# Patient Record
Sex: FEMALE | Race: BLACK OR AFRICAN AMERICAN | NOT HISPANIC OR LATINO | Employment: FULL TIME | URBAN - METROPOLITAN AREA
[De-identification: names, ages, dates, MRNs, and addresses within clinical notes are randomized per-mention and may not be internally consistent; named-entity substitution may affect disease eponyms.]

---

## 2020-02-06 ENCOUNTER — TELEPHONE (OUTPATIENT)
Dept: OBSTETRICS AND GYNECOLOGY | Age: 25
End: 2020-02-06

## 2020-02-07 ENCOUNTER — TELEPHONE (OUTPATIENT)
Dept: OBSTETRICS AND GYNECOLOGY | Age: 25
End: 2020-02-07

## 2020-02-07 NOTE — TELEPHONE ENCOUNTER
BARBIE,   Health dept faxing medical records / patient labs, pt scheduled as a new pt  with DR Mckeon 02/12/20.

## 2020-02-12 ENCOUNTER — OFFICE VISIT (OUTPATIENT)
Dept: OBSTETRICS AND GYNECOLOGY | Age: 25
End: 2020-02-12

## 2020-02-12 VITALS
BODY MASS INDEX: 24.03 KG/M2 | DIASTOLIC BLOOD PRESSURE: 58 MMHG | SYSTOLIC BLOOD PRESSURE: 98 MMHG | WEIGHT: 135.6 LBS | HEIGHT: 63 IN

## 2020-02-12 DIAGNOSIS — R51.9 PREGNANCY HEADACHE IN THIRD TRIMESTER: ICD-10-CM

## 2020-02-12 DIAGNOSIS — Z3A.32 32 WEEKS GESTATION OF PREGNANCY: Primary | ICD-10-CM

## 2020-02-12 DIAGNOSIS — O98.119 SYPHILIS OF MOTHER DURING PREGNANCY: ICD-10-CM

## 2020-02-12 DIAGNOSIS — Z13.1 SCREENING FOR DIABETES MELLITUS: ICD-10-CM

## 2020-02-12 DIAGNOSIS — O09.30 HISTORY OF INADEQUATE PRENATAL CARE: ICD-10-CM

## 2020-02-12 DIAGNOSIS — O26.893 PREGNANCY HEADACHE IN THIRD TRIMESTER: ICD-10-CM

## 2020-02-12 DIAGNOSIS — Z98.891 PREVIOUS CESAREAN SECTION: ICD-10-CM

## 2020-02-12 DIAGNOSIS — Z13.89 SCREENING FOR HEMATURIA OR PROTEINURIA: ICD-10-CM

## 2020-02-12 DIAGNOSIS — Z11.3 SCREENING FOR STD (SEXUALLY TRANSMITTED DISEASE): ICD-10-CM

## 2020-02-12 DIAGNOSIS — Z23 NEED FOR PROPHYLACTIC VACCINATION WITH COMBINED DIPHTHERIA-TETANUS-PERTUSSIS (DTP) VACCINE: ICD-10-CM

## 2020-02-12 PROBLEM — O34.219 MATERNAL CARE FOR UNSPECIFIED TYPE SCAR FROM PREVIOUS CESAREAN DELIVERY: Status: ACTIVE | Noted: 2020-02-12

## 2020-02-12 PROBLEM — O99.330 TOBACCO USE DURING PREGNANCY, ANTEPARTUM: Status: ACTIVE | Noted: 2020-02-12

## 2020-02-12 PROBLEM — Z87.51 HISTORY OF PRETERM LABOR: Status: ACTIVE | Noted: 2020-02-12

## 2020-02-12 PROBLEM — O34.211 MATERNAL CARE DUE TO LOW TRANSVERSE UTERINE SCAR FROM PREVIOUS CESAREAN DELIVERY: Status: ACTIVE | Noted: 2020-02-12

## 2020-02-12 LAB
CLARITY, POC: CLEAR
COLOR UR: ABNORMAL
EXTERNAL ANTIBODY SCREEN: NORMAL
GLUCOSE UR STRIP-MCNC: NEGATIVE MG/DL
PROT UR STRIP-MCNC: ABNORMAL MG/DL

## 2020-02-12 PROCEDURE — 99203 OFFICE O/P NEW LOW 30 MIN: CPT | Performed by: OBSTETRICS & GYNECOLOGY

## 2020-02-12 PROCEDURE — 90715 TDAP VACCINE 7 YRS/> IM: CPT | Performed by: OBSTETRICS & GYNECOLOGY

## 2020-02-12 PROCEDURE — 90471 IMMUNIZATION ADMIN: CPT | Performed by: OBSTETRICS & GYNECOLOGY

## 2020-02-12 RX ORDER — MAGNESIUM OXIDE 400 MG/1
400 TABLET ORAL DAILY
Qty: 30 TABLET | Refills: 2 | Status: ON HOLD | OUTPATIENT
Start: 2020-02-12 | End: 2020-03-03

## 2020-02-12 NOTE — PROGRESS NOTES
Subjective     Chief Complaint   Patient presents with   • Gynecologic Exam     New gyn, 30 weeks transfer, 1hr gtt today, No problems today   • Establish Care       Chiquita Galvez is a 24 y.o.  whose LMP is Patient's last menstrual period was 2019 (exact date). presents for transfer of care at 32 4/7 wk gest. Pt states pregnancy is dated by a 7 wk US done at a pregnancy US center (non-medical clinic) and CRL gave EDC of 2020. She had only one prenatal visit with a nurse practitioner at a [facility] OB practive at 29 weeks but was unhappy with her care and desires to transfer. She denies h/o STD with sister present in the room but I reviewed the labs with her and she states she has had penicillin shots for an infection. Prenatal labs were abnormal for +RPR 1:64 and FTA was positive. Pt [facility] and has 2 of her 3 penicillin injections for syphilis (see media). She has her third dose scheduled tomorrow and she states she will do. She denies other stds. MFM US done 20 (in care everywhere) used LMP due date of 2020 and EFW 51%, normal marek, posterior placenta, 3VC, inadequate view of ductal arch.     Pt states she has had 4 term vaginal deliveries, a 16 wk SAB and last pregnancy complicated by  labor. She was delivered at 34 weeks due to non-reassuring fetal status. BPP , tried IOL but tracing was non-reassuring and an emergent LTCS was performed. She desires . She declines elective repeat C/S. She declines sterilization. She desires depo provera postpartum, will given first dose before leaving hospital.    Pt was initially scheduled for an US today for anatomy, however she was 45 minutes late for that appt, therefore, it was not done.    Soc Hx- she is here with her sister today, pt is unemployed, she quit tobacco 2019, she denies use of alcohol or drugs    No Additional Complaints Reported    The following portions of the patient's history were  "reviewed and updated as appropriate:vital signs, allergies, current medications, past family history, past medical history, past social history, past surgical history and problem list      Review of Systems   A comprehensive review of systems was negative except for: Neurological: positive for headaches  GI- no N/V, - no bleeding or cramping    Objective      BP 98/58   Ht 160 cm (63\")   Wt 61.5 kg (135 lb 9.6 oz)   LMP 2019 (Exact Date)   Breastfeeding No   BMI 24.02 kg/m²     Physical Exam    General:   alert, appears stated age and no distress   Heart: regular rate and rhythm, S1, S2 normal, no murmur, click, rub or gallop   Lungs: clear to auscultation bilaterally   Breast: Not performed today   Neck: thyroid not enlarged, symmetric, no tenderness/mass/nodules   Abdomen: {soft NT ND, FH=32 cm   CVA: absent   Pelvis: Not performed today   Extremities: Extremities normal, atraumatic, no cyanosis or edema   Neurologic: negative   Psychiatric: Normal affect, judgement, and mood       Lab Review   Labs: prenatal labs reviewed in care everywhere after our labs were ordered (wasn't available at time of visit)  2020 RPR+ 1:64, syphilis Ab+    Imaging   Reviewed [facility] US report from 2020    Assessment/Plan     ASSESSMENT  1. 32 weeks gestation of pregnancy    2. Screening for diabetes mellitus    3. Syphilis of mother during pregnancy    4. Previous  section    5. Need for prophylactic vaccination with combined diphtheria-tetanus-pertussis (DTP) vaccine    6. Pregnancy headache in third trimester    7. Screening for hematuria or proteinuria    8. Screening for STD (sexually transmitted disease)    9. History of inadequate prenatal care        PLAN  1.   Orders Placed This Encounter   Procedures   • Urine Culture - Urine, Urine, Clean Catch   • Chlamydia trachomatis, Neisseria gonorrhoeae, PCR - Urine, Urine, Clean Catch   • Tdap Vaccine Greater Than or Equal To 8yo IM   • Gestational Screen 1 " Hr ([facility])   • TSH   • OB Panel With HIV   • Drug Profile Urine - 9 Drugs - Urine, Clean Catch   • POC Urinalysis Dipstick       2. Medications prescribed this encounter:        New Medications Ordered This Visit   Medications   • Prenatal MV-Min-Fe Fum-FA-DHA (PRENATAL 1) 30-0.975-200 MG capsule     Sig: Take 1 tablet by mouth Daily.     Dispense:  30 capsule     Refill:  6   • magnesium oxide (MAG-OX) 400 MG tablet     Sig: Take 1 tablet by mouth Daily.     Dispense:  30 tablet     Refill:  2       3. Discussed risk of - 1% risk of uterine rupture in labor, rarely can be catastrophic and life threatening to mom and baby. Pt declines elective repeat at this time and has planned .  4. Try magnesium oxide daily for headache, if not better, refer to Neuro  5. Refer to M for consult regarding syphilis is pregnancy and later/inadequate prenatal care  6. H/o  labor but too late for progesterone therapy  7. Start weekly BPP due to h/o abnormal BPP at 34 weeks with last pregnancy.  8. 1hr glucose today  9. Offered PP sterilization but pt declines, desires depo provera    Follow up: 1 week(s)    Dora Mckeon MD  2020

## 2020-02-13 LAB
ABO GROUP BLD: ABNORMAL
AMPHETAMINES UR QL SCN: NEGATIVE NG/ML
BARBITURATES UR QL SCN: NEGATIVE NG/ML
BASOPHILS # BLD AUTO: 0 X10E3/UL (ref 0–0.2)
BASOPHILS NFR BLD AUTO: 0 %
BENZODIAZ UR QL: NEGATIVE NG/ML
BLD GP AB SCN SERPL QL: NEGATIVE
BZE UR QL: NEGATIVE NG/ML
CANNABINOIDS UR QL SCN: NEGATIVE NG/ML
EOSINOPHIL # BLD AUTO: 0.2 X10E3/UL (ref 0–0.4)
EOSINOPHIL NFR BLD AUTO: 2 %
ERYTHROCYTE [DISTWIDTH] IN BLOOD BY AUTOMATED COUNT: 17.8 % (ref 11.7–15.4)
GLUCOSE 1H P 50 G GLC PO SERPL-MCNC: 136 MG/DL (ref 65–179)
HBV SURFACE AG SERPL QL IA: NEGATIVE
HCT VFR BLD AUTO: 29.3 % (ref 34–46.6)
HCV AB S/CO SERPL IA: 0.1 S/CO RATIO (ref 0–0.9)
HGB BLD-MCNC: 8.3 G/DL (ref 11.1–15.9)
HIV 1+2 AB+HIV1 P24 AG SERPL QL IA: NON REACTIVE
IMM GRANULOCYTES # BLD AUTO: 0 X10E3/UL (ref 0–0.1)
IMM GRANULOCYTES NFR BLD AUTO: 0 %
LYMPHOCYTES # BLD AUTO: 2.4 X10E3/UL (ref 0.7–3.1)
LYMPHOCYTES NFR BLD AUTO: 26 %
MCH RBC QN AUTO: 18.6 PG (ref 26.6–33)
MCHC RBC AUTO-ENTMCNC: 28.3 G/DL (ref 31.5–35.7)
MCV RBC AUTO: 66 FL (ref 79–97)
METHADONE UR QL SCN: NEGATIVE NG/ML
MONOCYTES # BLD AUTO: 0.4 X10E3/UL (ref 0.1–0.9)
MONOCYTES NFR BLD AUTO: 4 %
NEUTROPHILS # BLD AUTO: 6.1 X10E3/UL (ref 1.4–7)
NEUTROPHILS NFR BLD AUTO: 68 %
OPIATES UR QL: NEGATIVE NG/ML
PCP UR QL: NEGATIVE NG/ML
PLATELET # BLD AUTO: 273 X10E3/UL (ref 150–450)
PROPOXYPH UR QL SCN: NEGATIVE NG/ML
RBC # BLD AUTO: 4.47 X10E6/UL (ref 3.77–5.28)
RH BLD: POSITIVE
RPR SER QL: REACTIVE
RPR SER-TITR: ABNORMAL {TITER}
RUBV IGG SERPL IA-ACNC: 13.2 INDEX
TREPONEMA PALLIDUM IGG+IGM AB [PRESENCE] IN SERUM OR PLASMA BY IMMUNOASSAY: REACTIVE
TSH SERPL DL<=0.005 MIU/L-ACNC: 1.11 UIU/ML (ref 0.27–4.2)
WBC # BLD AUTO: 9.2 X10E3/UL (ref 3.4–10.8)

## 2020-02-15 LAB
BACTERIA UR CULT: ABNORMAL
BACTERIA UR CULT: ABNORMAL
C TRACH RRNA SPEC QL NAA+PROBE: NEGATIVE
N GONORRHOEA RRNA SPEC QL NAA+PROBE: NEGATIVE

## 2020-02-17 ENCOUNTER — TELEPHONE (OUTPATIENT)
Dept: OBSTETRICS AND GYNECOLOGY | Age: 25
End: 2020-02-17

## 2020-02-17 NOTE — TELEPHONE ENCOUNTER
Mervat,  at [facility], has been trying multiple times to reach the pt to notify her of her appt scheduled 2/19/2020 following her appt with Dr Mckeon the same day. Pt mother has been answering the phone, but she is not on the verbal release form.     Rahcna would you please try to reach the pt to notify her of [facility] appt? Please Advise

## 2020-02-18 ENCOUNTER — TELEPHONE (OUTPATIENT)
Dept: OBSTETRICS AND GYNECOLOGY | Age: 25
End: 2020-02-18

## 2020-02-18 NOTE — TELEPHONE ENCOUNTER
Per [facility] policy, pt cannot have 2 US in one day - US at [facility] and US at [facility] 2/19. Mervat has rescheduled the pt US at [facility] for 2/26/2020 @ 8am. Please update pt with rescheduled appt tomorrow. Thank you Rachna.

## 2020-02-18 NOTE — TELEPHONE ENCOUNTER
Tenzin Briscoe can you inform pt of her [facility] appt at 1pm when she comes in for her appt with Dr Mckeon at 10:45a tomorrow?    [facility] - [address], [facility] [address]    Thank you!!

## 2020-02-19 ENCOUNTER — ROUTINE PRENATAL (OUTPATIENT)
Dept: OBSTETRICS AND GYNECOLOGY | Age: 25
End: 2020-02-19

## 2020-02-19 ENCOUNTER — APPOINTMENT (OUTPATIENT)
Dept: ULTRASOUND IMAGING | Facility: HOSPITAL | Age: 25
End: 2020-02-19

## 2020-02-19 ENCOUNTER — PROCEDURE VISIT (OUTPATIENT)
Dept: OBSTETRICS AND GYNECOLOGY | Age: 25
End: 2020-02-19

## 2020-02-19 ENCOUNTER — HOSPITAL ENCOUNTER (INPATIENT)
Facility: HOSPITAL | Age: 25
LOS: 2 days | Discharge: HOME | End: 2020-02-21
Attending: OBSTETRICS & GYNECOLOGY | Admitting: OBSTETRICS & GYNECOLOGY

## 2020-02-19 VITALS — BODY MASS INDEX: 24.66 KG/M2 | SYSTOLIC BLOOD PRESSURE: 104 MMHG | WEIGHT: 139.2 LBS | DIASTOLIC BLOOD PRESSURE: 60 MMHG

## 2020-02-19 DIAGNOSIS — O12.13 PROTEINURIA AFFECTING PREGNANCY IN THIRD TRIMESTER: ICD-10-CM

## 2020-02-19 DIAGNOSIS — O34.211 MATERNAL CARE DUE TO LOW TRANSVERSE UTERINE SCAR FROM PREVIOUS CESAREAN DELIVERY: ICD-10-CM

## 2020-02-19 DIAGNOSIS — O99.019 IRON DEFICIENCY ANEMIA DURING PREGNANCY: ICD-10-CM

## 2020-02-19 DIAGNOSIS — D50.9 IRON DEFICIENCY ANEMIA DURING PREGNANCY: ICD-10-CM

## 2020-02-19 DIAGNOSIS — O99.330 TOBACCO USE DURING PREGNANCY, ANTEPARTUM: ICD-10-CM

## 2020-02-19 DIAGNOSIS — O09.30 HISTORY OF INADEQUATE PRENATAL CARE: Primary | ICD-10-CM

## 2020-02-19 DIAGNOSIS — Z3A.33 33 WEEKS GESTATION OF PREGNANCY: Primary | ICD-10-CM

## 2020-02-19 DIAGNOSIS — Z87.51 HISTORY OF PRETERM LABOR: ICD-10-CM

## 2020-02-19 PROBLEM — Z34.90 PREGNANCY: Status: ACTIVE | Noted: 2020-02-19

## 2020-02-19 PROBLEM — E87.6 HYPOKALEMIA: Status: ACTIVE | Noted: 2020-02-19

## 2020-02-19 LAB
ALBUMIN SERPL-MCNC: 3.3 G/DL (ref 3.5–5.2)
ALBUMIN/GLOB SERPL: 1 G/DL
ALP SERPL-CCNC: 107 U/L (ref 39–117)
ALT SERPL W P-5'-P-CCNC: 5 U/L (ref 1–33)
ANION GAP SERPL CALCULATED.3IONS-SCNC: 8.6 MMOL/L (ref 5–15)
ANISOCYTOSIS BLD QL: ABNORMAL
AST SERPL-CCNC: 14 U/L (ref 1–32)
BILIRUB SERPL-MCNC: 0.2 MG/DL (ref 0.2–1.2)
BUN BLD-MCNC: 4 MG/DL (ref 6–20)
BUN/CREAT SERPL: 10.5 (ref 7–25)
BURR CELLS BLD QL SMEAR: ABNORMAL
CALCIUM SPEC-SCNC: 8.3 MG/DL (ref 8.6–10.5)
CHLORIDE SERPL-SCNC: 102 MMOL/L (ref 98–107)
CLARITY, POC: CLEAR
CO2 SERPL-SCNC: 23.4 MMOL/L (ref 22–29)
COLOR UR: ABNORMAL
CREAT BLD-MCNC: 0.38 MG/DL (ref 0.57–1)
CREAT UR-MCNC: 153.1 MG/DL
DEPRECATED RDW RBC AUTO: 38.7 FL (ref 37–54)
ERYTHROCYTE [DISTWIDTH] IN BLOOD BY AUTOMATED COUNT: 18 % (ref 12.3–15.4)
GFR SERPL CREATININE-BSD FRML MDRD: >150 ML/MIN/1.73
GLOBULIN UR ELPH-MCNC: 3.2 GM/DL
GLUCOSE BLD-MCNC: 77 MG/DL (ref 65–99)
GLUCOSE UR STRIP-MCNC: NEGATIVE MG/DL
HCT VFR BLD AUTO: 24.2 % (ref 34–46.6)
HEMOGLOBIN FRACTIONATION: NORMAL
HGB BLD-MCNC: 7.1 G/DL (ref 12–15.9)
HYPOCHROMIA BLD QL: ABNORMAL
LYMPHOCYTES # BLD MANUAL: 1.04 10*3/MM3 (ref 0.7–3.1)
LYMPHOCYTES NFR BLD MANUAL: 12.6 % (ref 19.6–45.3)
LYMPHOCYTES NFR BLD MANUAL: 3.2 % (ref 5–12)
MCH RBC QN AUTO: 18.3 PG (ref 26.6–33)
MCHC RBC AUTO-ENTMCNC: 29.3 G/DL (ref 31.5–35.7)
MCV RBC AUTO: 62.4 FL (ref 79–97)
MICROCYTES BLD QL: ABNORMAL
MONOCYTES # BLD AUTO: 0.27 10*3/MM3 (ref 0.1–0.9)
NEUTROPHILS # BLD AUTO: 6.98 10*3/MM3 (ref 1.7–7)
NEUTROPHILS NFR BLD MANUAL: 84.2 % (ref 42.7–76)
PLAT MORPH BLD: NORMAL
PLATELET # BLD AUTO: 236 10*3/MM3 (ref 140–450)
PMV BLD AUTO: 10.7 FL (ref 6–12)
POIKILOCYTOSIS BLD QL SMEAR: ABNORMAL
POLYCHROMASIA BLD QL SMEAR: ABNORMAL
POTASSIUM BLD-SCNC: 3.3 MMOL/L (ref 3.5–5.2)
PROT SERPL-MCNC: 6.5 G/DL (ref 6–8.5)
PROT UR STRIP-MCNC: ABNORMAL MG/DL
PROT UR-MCNC: 20 MG/DL
PROT/CREAT UR: 130.6 MG/G CREA (ref 0–200)
RBC # BLD AUTO: 3.88 10*6/MM3 (ref 3.77–5.28)
SODIUM BLD-SCNC: 134 MMOL/L (ref 136–145)
WBC MORPH BLD: NORMAL
WBC NRBC COR # BLD: 8.29 10*3/MM3 (ref 3.4–10.8)

## 2020-02-19 PROCEDURE — 85007 BL SMEAR W/DIFF WBC COUNT: CPT | Performed by: OBSTETRICS & GYNECOLOGY

## 2020-02-19 PROCEDURE — 83021 HEMOGLOBIN CHROMOTOGRAPHY: CPT | Performed by: OBSTETRICS & GYNECOLOGY

## 2020-02-19 PROCEDURE — 76819 FETAL BIOPHYS PROFIL W/O NST: CPT | Performed by: OBSTETRICS & GYNECOLOGY

## 2020-02-19 PROCEDURE — 80053 COMPREHEN METABOLIC PANEL: CPT | Performed by: OBSTETRICS & GYNECOLOGY

## 2020-02-19 PROCEDURE — 59025 FETAL NON-STRESS TEST: CPT | Performed by: OBSTETRICS & GYNECOLOGY

## 2020-02-19 PROCEDURE — 76805 OB US >/= 14 WKS SNGL FETUS: CPT | Performed by: OBSTETRICS & GYNECOLOGY

## 2020-02-19 PROCEDURE — 99221 1ST HOSP IP/OBS SF/LOW 40: CPT | Performed by: OBSTETRICS & GYNECOLOGY

## 2020-02-19 PROCEDURE — 85027 COMPLETE CBC AUTOMATED: CPT | Performed by: OBSTETRICS & GYNECOLOGY

## 2020-02-19 PROCEDURE — 84156 ASSAY OF PROTEIN URINE: CPT | Performed by: OBSTETRICS & GYNECOLOGY

## 2020-02-19 PROCEDURE — 85660 RBC SICKLE CELL TEST: CPT | Performed by: OBSTETRICS & GYNECOLOGY

## 2020-02-19 PROCEDURE — 82570 ASSAY OF URINE CREATININE: CPT | Performed by: OBSTETRICS & GYNECOLOGY

## 2020-02-19 PROCEDURE — 99214 OFFICE O/P EST MOD 30 MIN: CPT | Performed by: OBSTETRICS & GYNECOLOGY

## 2020-02-19 PROCEDURE — 59025 FETAL NON-STRESS TEST: CPT

## 2020-02-19 RX ORDER — POTASSIUM CHLORIDE 1.5 G/1.77G
20 POWDER, FOR SOLUTION ORAL 2 TIMES DAILY
Status: DISCONTINUED | OUTPATIENT
Start: 2020-02-19 | End: 2020-02-21 | Stop reason: HOSPADM

## 2020-02-19 RX ORDER — ACETAMINOPHEN 325 MG/1
625 TABLET ORAL EVERY 6 HOURS PRN
Status: DISCONTINUED | OUTPATIENT
Start: 2020-02-19 | End: 2020-02-21 | Stop reason: HOSPADM

## 2020-02-19 RX ORDER — PRENATAL VIT NO.126/IRON/FOLIC 28MG-0.8MG
1 TABLET ORAL DAILY
Status: DISCONTINUED | OUTPATIENT
Start: 2020-02-19 | End: 2020-02-21 | Stop reason: HOSPADM

## 2020-02-19 RX ORDER — ZOLPIDEM TARTRATE 5 MG/1
5 TABLET ORAL NIGHTLY PRN
Status: DISCONTINUED | OUTPATIENT
Start: 2020-02-19 | End: 2020-02-21 | Stop reason: HOSPADM

## 2020-02-19 RX ORDER — CALCIUM CARBONATE 200(500)MG
1 TABLET,CHEWABLE ORAL DAILY
Status: ON HOLD | COMMUNITY
End: 2020-03-18

## 2020-02-19 RX ADMIN — Medication 400 MG: at 22:46

## 2020-02-19 RX ADMIN — Medication 1 TABLET: at 22:22

## 2020-02-19 RX ADMIN — POTASSIUM CHLORIDE 20 MEQ: 1.5 POWDER, FOR SOLUTION ORAL at 22:22

## 2020-02-19 NOTE — PROGRESS NOTES
Pregnancy at 33 4/7 wk gest  Pt c/o good fetal movement, c/o some irregular contractions, no bleeding, did have 3rd injection of penicillin for syphilis  ROS- all negative except for some contractions  Exam-awake/alert in nad  Abd-soft gravid NT  extr- NT  Neuro- no deficits  US today wt 21% AC 22% posterior placenta, normal marek (but spine and feet not seen), BPP 8/8 DANIEL=15  A/P pregnancy at 33 4/7 wk gest  1. Inadequate prenatal care- MFM consult, toxo screen was negative last week  2. Severe anemia- ferritin sent today, will need IV iron, will obtain Heme consult in hospital  3. Syphilis in pregnancy- first RPR titer was 1:64 at [facility], now down to 1:16, plan MFM consult  4. 2+ proteinuria today- recommend in-pt hospitalization for 24 hr urine and evaluation for pre-eclampsia as pt has not been reliable.  5. H/o  delivery with non-reassuring fetal status at 34 weeks- continue weekly BPPs

## 2020-02-20 ENCOUNTER — APPOINTMENT (OUTPATIENT)
Dept: ULTRASOUND IMAGING | Facility: HOSPITAL | Age: 25
End: 2020-02-20

## 2020-02-20 LAB
ABO GROUP BLD: NORMAL
AMPHET+METHAMPHET UR QL: NEGATIVE
ANION GAP SERPL CALCULATED.3IONS-SCNC: 10 MMOL/L (ref 5–15)
BARBITURATES UR QL SCN: NEGATIVE
BENZODIAZ UR QL SCN: NEGATIVE
BLD GP AB SCN SERPL QL: NEGATIVE
BUN BLD-MCNC: 3 MG/DL (ref 6–20)
BUN/CREAT SERPL: 7.3 (ref 7–25)
CALCIUM SPEC-SCNC: 8.2 MG/DL (ref 8.6–10.5)
CANNABINOIDS SERPL QL: NEGATIVE
CHLORIDE SERPL-SCNC: 103 MMOL/L (ref 98–107)
CO2 SERPL-SCNC: 21 MMOL/L (ref 22–29)
COCAINE UR QL: NEGATIVE
CREAT BLD-MCNC: 0.41 MG/DL (ref 0.57–1)
FERRITIN SERPL-MCNC: 5.77 NG/ML (ref 13–150)
FOLATE SERPL-MCNC: 13.4 NG/ML (ref 4.78–24.2)
GFR SERPL CREATININE-BSD FRML MDRD: >150 ML/MIN/1.73
GLUCOSE 1H P 100 G GLC PO SERPL-MCNC: 86 MG/DL (ref 74–180)
GLUCOSE 2H P 100 G GLC PO SERPL-MCNC: 109 MG/DL (ref 74–155)
GLUCOSE 3H P 100 G GLC PO SERPL-MCNC: 110 MG/DL (ref 74–140)
GLUCOSE BLD-MCNC: 82 MG/DL (ref 65–99)
IRON 24H UR-MRATE: 48 MCG/DL (ref 37–145)
IRON SATN MFR SERPL: 10 % (ref 20–50)
LDH SERPL-CCNC: 175 U/L (ref 135–214)
METHADONE UR QL SCN: NEGATIVE
OPIATES UR QL: NEGATIVE
OXYCODONE UR QL SCN: NEGATIVE
POTASSIUM BLD-SCNC: 3.7 MMOL/L (ref 3.5–5.2)
PROT 24H UR-MRATE: 180 MG/24HOURS (ref 0–150)
RETICS # AUTO: 0.06 10*6/MM3 (ref 0.02–0.13)
RETICS/RBC NFR AUTO: 1.24 % (ref 0.7–1.9)
RH BLD: POSITIVE
SODIUM BLD-SCNC: 134 MMOL/L (ref 136–145)
T&S EXPIRATION DATE: NORMAL
TIBC SERPL-MCNC: 501 MCG/DL (ref 298–536)
TRANSFERRIN SERPL-MCNC: 336 MG/DL (ref 200–360)
VIT B12 BLD-MCNC: 230 PG/ML (ref 211–946)

## 2020-02-20 PROCEDURE — 93005 ELECTROCARDIOGRAM TRACING: CPT | Performed by: OBSTETRICS & GYNECOLOGY

## 2020-02-20 PROCEDURE — 86901 BLOOD TYPING SEROLOGIC RH(D): CPT | Performed by: OBSTETRICS & GYNECOLOGY

## 2020-02-20 PROCEDURE — 80299 QUANTITATIVE ASSAY DRUG: CPT | Performed by: OBSTETRICS & GYNECOLOGY

## 2020-02-20 PROCEDURE — 85045 AUTOMATED RETICULOCYTE COUNT: CPT | Performed by: INTERNAL MEDICINE

## 2020-02-20 PROCEDURE — 99232 SBSQ HOSP IP/OBS MODERATE 35: CPT | Performed by: OBSTETRICS & GYNECOLOGY

## 2020-02-20 PROCEDURE — G0480 DRUG TEST DEF 1-7 CLASSES: HCPCS | Performed by: OBSTETRICS & GYNECOLOGY

## 2020-02-20 PROCEDURE — 80307 DRUG TEST PRSMV CHEM ANLYZR: CPT | Performed by: OBSTETRICS & GYNECOLOGY

## 2020-02-20 PROCEDURE — 93010 ELECTROCARDIOGRAM REPORT: CPT | Performed by: INTERNAL MEDICINE

## 2020-02-20 PROCEDURE — 83540 ASSAY OF IRON: CPT | Performed by: OBSTETRICS & GYNECOLOGY

## 2020-02-20 PROCEDURE — 84466 ASSAY OF TRANSFERRIN: CPT | Performed by: OBSTETRICS & GYNECOLOGY

## 2020-02-20 PROCEDURE — 63710000001 DIPHENHYDRAMINE PER 50 MG: Performed by: INTERNAL MEDICINE

## 2020-02-20 PROCEDURE — 86900 BLOOD TYPING SEROLOGIC ABO: CPT | Performed by: OBSTETRICS & GYNECOLOGY

## 2020-02-20 PROCEDURE — 76818 FETAL BIOPHYS PROFILE W/NST: CPT

## 2020-02-20 PROCEDURE — 99255 IP/OBS CONSLTJ NEW/EST HI 80: CPT | Performed by: INTERNAL MEDICINE

## 2020-02-20 PROCEDURE — 81050 URINALYSIS VOLUME MEASURE: CPT | Performed by: OBSTETRICS & GYNECOLOGY

## 2020-02-20 PROCEDURE — 80184 ASSAY OF PHENOBARBITAL: CPT | Performed by: OBSTETRICS & GYNECOLOGY

## 2020-02-20 PROCEDURE — 82607 VITAMIN B-12: CPT | Performed by: OBSTETRICS & GYNECOLOGY

## 2020-02-20 PROCEDURE — 25010000002 CYANOCOBALAMIN PER 1000 MCG: Performed by: INTERNAL MEDICINE

## 2020-02-20 PROCEDURE — 82951 GLUCOSE TOLERANCE TEST (GTT): CPT | Performed by: OBSTETRICS & GYNECOLOGY

## 2020-02-20 PROCEDURE — 83615 LACTATE (LD) (LDH) ENZYME: CPT | Performed by: INTERNAL MEDICINE

## 2020-02-20 PROCEDURE — 82746 ASSAY OF FOLIC ACID SERUM: CPT | Performed by: OBSTETRICS & GYNECOLOGY

## 2020-02-20 PROCEDURE — 76810 OB US >/= 14 WKS ADDL FETUS: CPT

## 2020-02-20 PROCEDURE — 99222 1ST HOSP IP/OBS MODERATE 55: CPT | Performed by: INTERNAL MEDICINE

## 2020-02-20 PROCEDURE — 80048 BASIC METABOLIC PNL TOTAL CA: CPT | Performed by: OBSTETRICS & GYNECOLOGY

## 2020-02-20 PROCEDURE — 25010000002 IRON SUCROSE PER 1 MG: Performed by: INTERNAL MEDICINE

## 2020-02-20 PROCEDURE — 84156 ASSAY OF PROTEIN URINE: CPT | Performed by: OBSTETRICS & GYNECOLOGY

## 2020-02-20 PROCEDURE — 86850 RBC ANTIBODY SCREEN: CPT | Performed by: OBSTETRICS & GYNECOLOGY

## 2020-02-20 PROCEDURE — 82952 GTT-ADDED SAMPLES: CPT | Performed by: OBSTETRICS & GYNECOLOGY

## 2020-02-20 RX ORDER — SODIUM CHLORIDE 0.9 % (FLUSH) 0.9 %
10 SYRINGE (ML) INJECTION EVERY 12 HOURS SCHEDULED
Status: DISCONTINUED | OUTPATIENT
Start: 2020-02-20 | End: 2020-02-21 | Stop reason: HOSPADM

## 2020-02-20 RX ORDER — DIPHENHYDRAMINE HCL 25 MG
25 CAPSULE ORAL ONCE
Status: COMPLETED | OUTPATIENT
Start: 2020-02-20 | End: 2020-02-20

## 2020-02-20 RX ORDER — ACETAMINOPHEN 325 MG/1
650 TABLET ORAL ONCE
Status: COMPLETED | OUTPATIENT
Start: 2020-02-20 | End: 2020-02-20

## 2020-02-20 RX ORDER — FAMOTIDINE 10 MG/ML
20 INJECTION, SOLUTION INTRAVENOUS 2 TIMES DAILY
Status: DISCONTINUED | OUTPATIENT
Start: 2020-02-20 | End: 2020-02-20

## 2020-02-20 RX ORDER — SODIUM CHLORIDE 0.9 % (FLUSH) 0.9 %
10 SYRINGE (ML) INJECTION AS NEEDED
Status: DISCONTINUED | OUTPATIENT
Start: 2020-02-20 | End: 2020-02-21 | Stop reason: HOSPADM

## 2020-02-20 RX ORDER — CYANOCOBALAMIN 1000 UG/ML
1000 INJECTION, SOLUTION INTRAMUSCULAR; SUBCUTANEOUS DAILY
Status: DISCONTINUED | OUTPATIENT
Start: 2020-02-20 | End: 2020-02-21 | Stop reason: HOSPADM

## 2020-02-20 RX ADMIN — ZOLPIDEM TARTRATE 5 MG: 5 TABLET ORAL at 20:55

## 2020-02-20 RX ADMIN — IRON SUCROSE 200 MG: 20 INJECTION, SOLUTION INTRAVENOUS at 14:10

## 2020-02-20 RX ADMIN — ACETAMINOPHEN 650 MG: 325 TABLET, FILM COATED ORAL at 13:06

## 2020-02-20 RX ADMIN — DIPHENHYDRAMINE HYDROCHLORIDE 25 MG: 25 CAPSULE ORAL at 13:07

## 2020-02-20 RX ADMIN — FAMOTIDINE 20 MG: 10 INJECTION INTRAVENOUS at 13:56

## 2020-02-20 RX ADMIN — CYANOCOBALAMIN 1000 MCG: 1000 INJECTION, SOLUTION INTRAMUSCULAR at 13:06

## 2020-02-20 RX ADMIN — ACETAMINOPHEN 650 MG: 325 TABLET, FILM COATED ORAL at 20:55

## 2020-02-20 NOTE — NON STRESS TEST
Chiquita Galvez, a  at 33w5d with an MRAISA of 2020, Date entered prior to episode creation, was seen at [facility] for a nonstress test.    Chief Complaint   Patient presents with   • Elevated Blood Pressure     sent from office for protienuria, pt states she has low iron and needs to repeat a GTT. +FM, denies LOF or VB, c/o frontal headache       Patient Active Problem List   Diagnosis   • Maternal care due to low transverse uterine scar from previous  delivery   • History of inadequate prenatal care   • Syphilis of mother during pregnancy   • History of  labor   • Tobacco use during pregnancy, antepartum   • Pregnancy   • Proteinuria affecting pregnancy in third trimester   • Hypokalemia       Start Time:   Stop Time:      reactive

## 2020-02-20 NOTE — CONSULTS
Subjective     REASON FOR CONSULTATION:  1.   7, para 4, 33 weeks and 4 days pregnant    2.  History of syphilis, infectious disease been consulted.  Patient received 3 penicillin injections by the health department    3.  Anemia likely secondary to iron deficiency and B12 deficiency.    Provide an opinion on any further workup or treatment                             REQUESTING PHYSICIAN: Dr. John cardiac    RECORDS OBTAINED:  Records of the patients history including those obtained from the referring provider were reviewed and summarized in detail.    HISTORY OF PRESENT ILLNESS:  The patient is a 24 y.o. year old female who is here for an opinion about the above issue.    History of Present Illness patient is a 24-year-old female  7 para 4, currently 33 weeks and 4 days pregnant who is here today.  She had complaints of headache and she had protein in the urine.  Patient did not get prenatal care early on.  She had one visit at 29 weeks with Devi then was transferred to the [facility] last week.  She has diagnosis with syphilis at 29 weeks and she has had 3 penicillin injections at the health department.  Her initial RPR titer was 1 is 264 and recheck last week was 1 is 216.  Her previous obstetric/gynecological history is noted for 4 vaginal deliveries and 1 .  Her last pregnancy was at 34 weeks.  She presented with  labor but had urgent C-sections due to nonreassuring fetal status.  She declined tubal sterilization this time.  She has quit smoking this pregnancy.  Her hemoglobin was 8.3 last week and today is 7.1.  She has been taking 1 iron pill a day.  Her glucose was elevated at 136.  Her liver function tests are normal.    We were consulted because she was anemic.  She is found to be iron deficient as well as B12 deficient.  Her B12 level is 231.  She will require IV Venofer as she cannot tolerate oral iron and start on B12 injections.    Patient is receiving 25  urine for protein.  They have requested an infectious disease consult.  We have been consulted in order to see if she is eligible for IV iron infusions.    When they were doing the 3-hour glucose tolerance test she became tachycardic in the 120s and was transferred to the labor and delivery for observation and now her EKG was done which showed heart rate of 99 normal sinus rhythm    Past Medical History:   Diagnosis Date   • Migraine    • Syphilis 2019    FTA positive during pregnancy, [facility]        Past Surgical History:   Procedure Laterality Date   •  SECTION          No current facility-administered medications on file prior to encounter.      Current Outpatient Medications on File Prior to Encounter   Medication Sig Dispense Refill   • calcium carbonate (TUMS) 500 MG chewable tablet Chew 1 tablet Daily.     • magnesium oxide (MAG-OX) 400 MG tablet Take 1 tablet by mouth Daily. 30 tablet 2   • Prenatal MV-Min-Fe Fum-FA-DHA (PRENATAL 1) 30-0.975-200 MG capsule Take 1 tablet by mouth Daily. 30 capsule 6        ALLERGIES:  No Known Allergies     Social History     Socioeconomic History   • Marital status: Single     Spouse name: Not on file   • Number of children: Not on file   • Years of education: Not on file   • Highest education level: Not on file   Tobacco Use   • Smoking status: Former Smoker     Types: Cigars     Last attempt to quit: 2019     Years since quittin.2   • Smokeless tobacco: Never Used   Substance and Sexual Activity   • Alcohol use: Never     Frequency: Never   • Drug use: Never   • Sexual activity: Yes     Partners: Male     Birth control/protection: None        History reviewed. No pertinent family history.     Review of Systems   Constitutional: Positive for fatigue. Negative for appetite change, chills, diaphoresis, fever and unexpected weight change.   HENT: Negative for hearing loss, sore throat and trouble swallowing.    Respiratory: Negative for  cough, chest tightness, shortness of breath and wheezing.    Cardiovascular: Negative for chest pain, palpitations and leg swelling.   Gastrointestinal: Negative for abdominal distention, abdominal pain, constipation, diarrhea, nausea and vomiting.   Genitourinary: Negative for dysuria, frequency, hematuria and urgency.   Musculoskeletal: Negative for joint swelling.        No muscle weakness.   Skin: Negative for rash and wound.   Neurological: Negative for seizures, syncope, speech difficulty, weakness, numbness and headaches.   Hematological: Negative for adenopathy. Does not bruise/bleed easily.   Psychiatric/Behavioral: Negative for behavioral problems, confusion and suicidal ideas.        Objective     Vitals:    02/20/20 0854 02/20/20 0915 02/20/20 1029 02/20/20 1033   BP:  103/62 95/65    BP Location:       Patient Position:       Pulse: (!) 133  94 100   Resp:       Temp:  97.9 °F (36.6 °C)     TempSrc:  Oral     SpO2: 100%   100%   Weight:       Height:         No flowsheet data found.    Physical Exam      GENERAL:  Well-developed, well-nourished in no acute distress.   NECK:  Supple with good range of motion; no thyromegaly or masses, no JVD.  LYMPHATICS:  No cervical, supraclavicular, axillary or inguinal adenopathy.  CHEST:  Lungs clear to auscultation. Good airflow.  CARDIAC:  Regular rate and rhythm without murmurs, rubs or gallops. Normal S1,S2.  ABDOMEN:  Soft, nontender with no hepatosplenomegaly or masses.  EXTREMITIES:  No clubbing, cyanosis or edema.  NEUROLOGICAL:  Cranial Nerves II-XII grossly intact.  No focal neurological deficits.  PSYCHIATRIC:  Normal affect and mood.        RECENT LABS:  Hematology WBC   Date Value Ref Range Status   02/19/2020 8.29 3.40 - 10.80 10*3/mm3 Final     RBC   Date Value Ref Range Status   02/19/2020 3.88 3.77 - 5.28 10*6/mm3 Final     Hemoglobin   Date Value Ref Range Status   02/19/2020 7.1 (L) 12.0 - 15.9 g/dL Final     Hematocrit   Date Value Ref Range  Status   2020 24.2 (L) 34.0 - 46.6 % Final     Platelets   Date Value Ref Range Status   2020 236 140 - 450 10*3/mm3 Final          Assessment/Plan     1.  Multifactorial anemia with iron deficiency and B12 deficiency. .  Patient has been taking oral iron 1 a day but it causes her constipation and nausea and she is not able to tolerate oral iron.  Her B12 level was 231.  I have reviewed her iron profile and she is iron deficient and she will require IV Venofer.  I discussed IV Venofer with the patient as well as starting B12 injections.    2.   7 para 4 now 33 weeks and 4 days pregnant.  Patient has not been compliant with her prenatal care.    3.  History of syphilis for which she received 3 penicillin [facility] the [facility] and infectious diseases been consulted.    4.  Proteinuria, 24 urine protein is being pending.  Her blood pressure is stable.  She does not show signs of eclampsia according to the nurse.    Plan  1. Start IV Venofer daily times x3 doses  2. Start B12 injections 1000 mcg IM daily for 5 days and then once a week for 4 weeks and then once a month  3. No evidence of active bleeding.  4. Infectious disease consult obtained in order to evaluate her underlying syphilis status  5. 24 urine for protein pending  6. Will follow along with you.    Roxie Max MD

## 2020-02-20 NOTE — CONSULTS
Referring Provider: Bisi Bermudez MD  Reason for Consultation:   Chief Complaint   Patient presents with   • Elevated Blood Pressure     sent from office for protienuria, pt states she has low iron and needs to repeat a GTT. +FM, denies LOF or VB, c/o frontal headache         Subjective   History of present illness:   This very nice 24-year-old we are asked to provide evaluation opinion regarding syphilis.    Patient is 33 weeks and 5 days pregnant and was admitted for proteinuria, iron deficiency and a repeat glucose tolerance test.  While admitted, we have been asked to comment on syphilis.  She says she was diagnosed with syphilis last month incidentally on routine prenatal testing.  She denies any symptoms.  She has 5 children and last delivered about 3 years ago and thinks she was negative for syphilis at that time.  She was [facility] the [facility] department for positive RPR and received 3 weekly doses of IM penicillin.  She tolerated these well and last dose was 2020.  Other than some dyspnea, she feels fine    Past Medical History:   Diagnosis Date   • Migraine    • Syphilis 2019    FTA positive during pregnancy, tx at [facility]       Past Surgical History:   Procedure Laterality Date   •  SECTION  2017     No family history of infectious diseases  Social history: She lives here in [place] with her sister.    No Known Allergies    Review of Systems  Pertinent items are noted in HPI, all other systems reviewed and negative    Objective     Physical Exam:   Vital Signs   Temp:  [97.7 °F (36.5 °C)-98.4 °F (36.9 °C)] 97.9 °F (36.6 °C)  Heart Rate:  [] 100  Resp:  [14-18] 14  BP: ()/(52-82) 95/65    GENERAL: Awake and alert, in no acute distress.   HEENT: Oropharynx is clear. Hearing is grossly normal.   EYES: PERRL. No conjunctival injection. No lid lag.   LYMPHATICS: No lymphadenopathy of the neck or inguinal regions.   HEART: Regular rate and regular rhythm. No  peripheral edema.   LUNGS: Clear to auscultation anteriorly with normal respiratory effort.   GI: Soft, nontender, distended consistent with third trimester pregnancy; no appreciable organomegaly.   SKIN: Warm and dry without cutaneous eruptions   PSYCHIATRIC: Appropriate mood, affect, insight, and judgment.     Results Review:  1/23/2020 RPR 1:64  2/12 RPR 1:16  HIV negative    Radiology:      Assessment/Plan   1.  Late latent syphilis: She has received appropriate treatment with q. weekly IM penicillin x3 at the [facility].  She has already been told she needs to have repeat RPR in 6 months.  She has not had long enough infection to have tertiary syphilis which normally takes 5 to 20 years of untreated infection before developing tertiary symptoms. Treatment is still with IM penicillin.  Therefore I recommend no additional antibiotics or testing except for repeat RPR in 6 months at the health department     Thank you for this consult.  We will be happy to reevaluate anytime you need      Dusty Diana MD  02/20/20  11:25 AM

## 2020-02-20 NOTE — PAYOR COMM NOTE
"[facility]  [address] [place] [address]  Facility NPI: [id number]  Coral Kumar  Fax: [phone]  Phone: [phone] (Margoth: [phone], Ember: [phone])  Email: [email or web address]    Date: 02/20/2020  To: PASSPORT  Fax: [phone]  Subject: MEDICAL MATERNITY   Reference #: [id number]  Please don't hesitate to contact me with any questions or concerns.    Alaina Peña (24 y.o. Female)     Date of Birth Social Security Number Address [address] Phone MRN    1995  [address] [place] [address] [phone] [id number]    Islam Marital Status          None Single       Admission Date Admission Type Admitting Provider Attending Provider Department, Room/Bed    2/19/20 Elective Bisi Bermudez MD Kernek, Kimberly A, MD [facility] LABOR DELIVERY, L07/1    Discharge Date Discharge Disposition Discharge Destination                       Attending Provider:  Paris Mckeon MD    Allergies:  No Known Allergies    Isolation:  None   Infection:  None   Code Status:  CPR    Ht:  160 cm (63\")   Wt:  63 kg (139 lb)    Admission Cmt:  None   Principal Problem:  None                Active Insurance as of 2/19/2020     Primary Coverage     Payor Plan Insurance Group Employer/Plan Group    [facility] [employer] [id number]     Payor Plan Address Payor Plan Phone Number Payor Plan Fax Number Effective Dates    [address] [phone]  10/1/2015 - None Entered    [facility] [id number]       Subscriber Name Subscriber Birth Date Member ID       ALAINA PEÑA 1995 [id number]                 Emergency Contacts      (Rel.) Home Phone Work Phone Mobile Phone    MARIANA PARIS (Mother) [phone] -- [phone]               History & Physical      Paris Mckeon MD at 02/19/20 [address] [place] [address]  Obstetric History and Physical    Chief Complaint   Patient presents with   • Elevated Blood Pressure     sent from office for " protienuria, pt states she has low iron and needs to repeat a GTT. +FM, denies LOF or VB, c/o frontal headache       Subjective     Patient is a 24 y.o. female  currently at 33w4d, who presents to antepartum service from office today due to 2+protein in the urine. She also c/o a headache. She c/o some contractions and has h/o  labor.     Her prenatal care is complicated by late prenatal care. She had one visit at 29 weeks with [facility] then transferred to our group last week. She was dx with syphilis at 29 wks and she has [facility] all [facility]. Her initial RPR titer was 1:64 and recheck last week was 1:16.  Her previous obstetric/gynecological history is noted for 4 vaginal deliveries and 1 C/S. Her last pregnancy was at 34 weeks. She presented with  labor but had urgent C/S due to non-reassuring fetal status. OP note reviewed and LTCS was performed. Pt plans . She declines a tubal sterilization. She quit smoking this pregnancy. HGB last week was 8.3 and a ferritin was sent today in the office. Her 1hr glc was elevated at 136 and a 3hr GTT is ordered for tomorrow.     The following portions of the patients history were reviewed and updated as appropriate: current medications, allergies, past medical history, past surgical history, past family history, past social history and problem list .       Prenatal Information:  Prenatal Results     POC Urine Glucose/Protein     Test Value Reference Range Date Time    Urine Glucose Negative mg/dL Negative, 1000 mg/dL (3+) 20 1224    Urine Protein 2+ mg/dL Negative 20 1224          Initial Prenatal Labs     Test Value Reference Range Date Time    Hemoglobin        Hematocrit        Platelets 273 x10E3/uL 150 - 450 20 1026    Rubella IgG 13.20 index Immune >0.99 20 1026    Hepatitis B SAg Negative  Negative 20 1026      Nonreactive  Nonreactive 20 1211    Hepatitis C Ab 0.1 s/co ratio 0.0 -  0.9 02/12/20 1026      Nonreactive  Nonreactive 01/23/20 1211    RPR Reactive  Non Reactive 02/12/20 1026    ABO B   02/12/20 1026    Rh Positive   02/12/20 1026    Antibody Screen        HIV Non Reactive  Non Reactive 02/12/20 1026      Nonreactive  Nonreactive 01/23/20 1211    Urine Culture Final report   02/12/20 1053    Gonorrhea Negative  Negative 02/12/20 1053    Chlamydia Negative  Negative 02/12/20 1053    TSH 1.110 uIU/mL 0.270 - 4.200 02/12/20 1026          2nd and 3rd Trimester     Test Value Reference Range Date Time    Hemoglobin (repeated) 8.3 g/dL 11.1 - 15.9 02/12/20 1026    Hematocrit (repeated) 29.3 % 34.0 - 46.6 02/12/20 1026     mg/dL 65 - 179 02/12/20 1026    Antibody Screen (repeated) Negative  Negative 02/12/20 1026      Normal  Normal 02/12/20       Negative   01/23/20 1211    GTT Fasting        GTT 1 Hr        GTT 2 Hr        GTT 3 Hr        Group B Strep              Drug Screening     Test Value Reference Range Date Time    Amphetamine Screen Negative ng/mL Zptthb=0978 02/12/20 1054      Negative (arb'U) Negative 01/23/20 1211    Barbiturate Screen Negative ng/mL Ramlhl=898 02/12/20 1054    Benzodiazepine Screen Negative ng/mL Gdjueo=233 02/12/20 1054    Methadone Screen Negative ng/mL Vowmpi=125 02/12/20 1054    Phencyclidine Screen Negative ng/mL Cutoff=25 02/12/20 1054    Opiates Screen Negative ng/mL Gnwdmw=312 02/12/20 1054    THC Screen Negative ng/mL Cutoff=50 02/12/20 1054      Negative  Negative 01/23/20 1211    Cocaine Screen Negative ng/mL Mjhiop=965 02/12/20 1054      Negative  Negative 01/23/20 1211    Propoxyphene Screen Negative ng/mL Oqacig=495 02/12/20 1054    Buprenorphine Screen        Methamphetamine Screen        Oxycodone Screen        Tricyclic Antidepressants Screen              Other (Risk screening)     Test Value Reference Range Date Time    Varicella IgG 3.2 AI  01/23/20 1211    Parvovirus IgG        CMV IgG        Cystic Fibrosis        Hemoglobin  electrophoresis        NIPT        MSAFP-4        AFP (for NTD only)                  External Prenatal Results     Pregnancy Outside Results - Transcribed From Office Records - See Scanned Records For Details     Test Value Date Time    Hgb 8.3 g/dL 20 1026    Hct 29.3 % 20 1026    ABO B  20 1026    Rh Positive  20 1026    Antibody Screen Negative  20 1026      Normal  20       Negative  20 1211    Glucose Fasting GTT       Glucose Tolerance Test 1 hour       Glucose Tolerance Test 3 hour       Gonorrhea (discrete) Negative  20 1053    Chlamydia (discrete) Negative  20 1053    RPR Reactive  20 1026    VDRL       Syphilis Antibody Reactive  20 1026      >8.0 AI 20 1211    Rubella 13.20 index 20 1026    HBsAg Negative  20 1026      Nonreactive  20 1211    Herpes Simplex Virus PCR       Herpes Simplex VIrus Culture       HIV Non Reactive  20 1026      Nonreactive  20 1211    Hep C RNA Quant PCR       Hep C Antibody 0.1 s/co ratio 20 1026      Nonreactive  20 1211    AFP       Group B Strep       GBS Susceptibility to Clindamycin       GBS Susceptibility to Erythromycin       Fetal Fibronectin       Genetic Testing, Maternal Blood             Drug Screening     Test Value Date Time    Urine Drug Screen       Amphetamine Screen Negative ng/mL 20 1054      Negative (arb'U) 20 1211    Barbiturate Screen Negative ng/mL 20 1054    Benzodiazepine Screen Negative ng/mL 20 1054    Methadone Screen Negative ng/mL 20 1054    Phencyclidine Screen Negative ng/mL 20 1054    Opiates Screen Negative  20 1211    THC Screen       Cocaine Screen       Propoxyphene Screen Negative ng/mL 20 1054    Buprenorphine Screen       Methamphetamine Screen       Oxycodone Screen       Tricyclic Antidepressants Screen                    Past OB History:     OB History    Para Term   AB Living   7 5 4 1 1 5   SAB TAB Ectopic Molar Multiple Live Births   1 0 0 0 0 5      # Outcome Date GA Lbr Andrew/2nd Weight Sex Delivery Anes PTL Lv   7 Current            6  17   1899 g (4 lb 3 oz) F CS-LTranv   RUTHY   5 Term 16   3629 g (8 lb) M Vag-Spont   RUTHY   4 Term 14   3345 g (7 lb 6 oz) F Vag-Spont   RUTHY   3 Term 13   3685 g (8 lb 2 oz) M Vag-Spont   RUTHY   2 Term 12   3714 g (8 lb 3 oz) M Vag-Spont   RUTHY   1 SAB  16w0d             Birth Comments: required D&C for retained placenta       Past Medical History: Past Medical History:   Diagnosis Date   • Migraine    • Syphilis 2019    FTA positive during pregnancy[facility]      Past Surgical History Past Surgical History:   Procedure Laterality Date   •  SECTION        Family History: History reviewed. No pertinent family history.   Social History:  reports that she quit smoking about 3 months ago. Her smoking use included cigars. She has never used smokeless tobacco.   reports that she does not drink alcohol.   reports that she does not use drugs.        General ROS: The following systems were reviewed and negative;  constitution, respiratory, cardiovascular, gastrointestinal, musculoskeletal, behavioral/psych, endocrine and allergies / immunologic    Objective       Vital Signs Range for the last 24 hours  Temperature: Temp:  [98.4 °F (36.9 °C)] 98.4 °F (36.9 °C)   Temp Source: Temp src: Oral   BP: BP: (102-118)/(52-82) 102/52   Pulse: Heart Rate:  [] 88   Respirations: Resp:  [16] 16   SPO2:     O2 Amount (l/min):     O2 Devices     Weight: Weight:  [63 kg (139 lb)-63.1 kg (139 lb 3.2 oz)] 63 kg (139 lb)     Physical Examination: General appearance - alert, well appearing, and in no distress  Mental status - alert, oriented to person, place, and time  Abdomen - soft gravid NT  Back exam - full range of motion, no tenderness, palpable spasm or pain on motion  Neurological -  alert, oriented, normal speech, no focal findings or movement disorder noted  Musculoskeletal - no joint tenderness, deformity or swelling  Extremities - peripheral pulses normal, no pedal edema, no clubbing or cyanosis  Skin - normal coloration and turgor, no rashes, no suspicious skin lesions noted    Presentation: vtx per US today   Cervix: Exam by:     Dilation:     Effacement:     Station:        [facility] category 1 tracing, reactive, toco- uterine irritability  Fetal Heart Rate Assessment   Method: Fetal HR Assessment Method: external   Beats/min: Fetal HR (beats/min): 140   Baseline: Fetal Heart Baseline Rate: normal range   Variability: Fetal HR Variability: moderate (amplitude range 6 to 25 bpm)   Accels: Fetal HR Accelerations: greater than/equal to 15 bpm, lasting at least 15 seconds   Decels: Fetal HR Decelerations: absent   Tracing Category:       Uterine Assessment   Method: Method: external tocotransducer   Frequency (min): Contraction Frequency (Minutes): 0   Ctx Count in 10 min: Contractions in 10 Minutes: 0   Duration:     Intensity: Contraction Intensity: no contractions   Intensity by IUPC:     Resting Tone: Uterine Resting Tone: soft by palpation   Resting Tone by IUPC:     [place] Units:       Laboratory Results: see prenatal labs  Radiology Review: US today in office (see media)  Other Studies:      Assessment/Plan       Maternal care due to low transverse uterine scar from previous  delivery    History of inadequate prenatal care    Syphilis of mother during pregnancy    History of  labor    Pregnancy    Proteinuria affecting pregnancy in third trimester        Assessment:  1.  Intrauterine pregnancy at 33w4d gestation with reassuring fetal status.    2. No evidence of  labor per tracing currently  3.  Obstetrical history significant for newly dx syphilis  4.  GBS status: No results found for: STREPGPB    Plan:  1. Admit to antepartum service for 24 hr urine due to  proteinuria and HA. Pt may be too unreliable to complete at home. I spoke w/ Dr Frazier of [facility] and requested an [facility] consult due to syphilis. He will see her tomorrow. He requested that ID also be consulted to make sure there is no evidence of tertiary syphilis. Will check ferritin in AM and if low, will order IV iron for severe anemia. Will order 3hr GTT while in hospital tomorrow. Magnesium oxide started last week as outpt for headaches.  2. Plan of care has been reviewed with patient and patient and Dr Frazier  3.  Risks, benefits of treatment plan have been discussed.  4.  All questions have been answered.        Dora Mckeon MD  2/19/2020  7:30 PM    Electronically signed by Dora Mckeon MD at 02/19/20 1936       Vital Signs (last 2 days)     Date/Time   Temp   Temp src   Pulse   Resp   BP   Patient Position   SpO2    02/20/20 1448   --   --   88   --   --   --   100    02/20/20 1446   --   --   88   --   94/51   --   --    02/20/20 1431   --   --   82   --   107/57   --   --    02/20/20 1428   --   --   85   --   --   --   100    02/20/20 1418   --   --   105   --   --   --   100    02/20/20 1416   --   --   84   --   112/66   --   --    02/20/20 1413   --   --   97   --   --   --   100    02/20/20 1408   --   --   90   --   --   --   100    02/20/20 1401   --   --   101   16   115/71   Sitting   --    02/20/20 1323   98.1 (36.7)   Oral   90   17   96/60   Sitting   --    02/20/20 1219   --   --   --   --   --   --   --    Comment rows:    OBSERV: pt with NATTY at 02/20/20 1219    02/20/20 1114   --   --   93   --   90/58   --   --    02/20/20 1100   --   --   105   16   111/73   Sitting   100    02/20/20 1048   --   --   90   --   --   --   100    02/20/20 1044   --   --   96   --   95/72   --   --    02/20/20 1033   --   --   100   --   --   --   100    02/20/20 1029   --   --   94   --   95/65   --   --    02/20/20 1018   --   --   95   --   --   --   100    02/20/20 1014   --   --   92   --   103/65    --   --    02/20/20 0958   --   --   96   --   93/62   --   100    02/20/20 0943   --   --   103   --   99/65   --   100    02/20/20 0932   --   --   117   --   --   --   100    02/20/20 0929   --   --   104   --   95/64   --   --    02/20/20 0915   97.9 (36.6)   Oral   --   --   103/62   --   --    02/20/20 0854   --   --   (!) 133   --   --   --   100    02/20/20 0852   --   --   (!) 147   --   --   --   100    02/20/20 0851   --   --   (!) 152   --   --   --   --    02/20/20 0850   --   --   (!) 127   --   100/60   --   100    Comment rows:    OBSERV: Farideh Lobato at BS, EFMS adjusted at 02/20/20 0850    02/20/20 0849   --   --   (!) 126   --   --   --   --    02/20/20 0848   --   --   (!) 137   --   --   --   100    02/20/20 0847   --   --   (!) 140   --   --   --   --    02/20/20 0846   --   --   (!) 123   --   --   --   100    02/20/20 0835   98.2 (36.8)   Oral   (!) 123   14   97/59   Sitting   100    02/20/20 0600   --   --   --   --   --   --   --    Comment rows:    OBSERV:  at bedside for morning labs and to take fasting BS. nurse will be  notified by lab when pt. needs to drink her glucola at 02/20/20 0600    02/19/20 2304   --   --   --   --   --   --   --    Comment rows:    OBSERV: lab called to come and retrieve blood work that has been drawn on this pt. and to bring over the drink the pt. will need to have in the morning for her 3hr glucose tolerance test.  at 02/19/20 2304    02/19/20 2235   --   --   --   --   --   --   --    Comment rows:    OBSERV: [facility] called and asked if they would send the bloodwork that this anca cristiano to the lab throught their pnuematic tube system. this nurse is unable to leave the unit since nurse is alone with 3 patients. at 02/19/20 2235 02/19/20 2135   97.7 (36.5)   Oral   89   18   104/62   Sitting   100    02/19/20 2040   --   --   --   --   --   --   --    Comment rows:    OBSERV: pt. on antepartum unit now at 02/19/20 2040 02/19/20 1914   --   --    88   --   102/52   --   --    02/19/20 1900   --   --   93   --   115/66   --   --    02/19/20 1844   --   --   102   --   109/68   --   --    02/19/20 1828   --   --   118   --   118/82   --   --    02/19/20 1814   --   --   100   --   114/69   --   --    02/19/20 1758   --   --   98   16   114/64   --   --    02/19/20 1756   98.4 (36.9)   Oral   --   --   --   --   --            Prior to Admission Medications     Prescriptions Last Dose Informant Patient Reported? Taking?    calcium carbonate (TUMS) 500 MG chewable tablet 2/19/2020  Yes Yes    Chew 1 tablet Daily.    magnesium oxide (MAG-OX) 400 MG tablet Past Week  No Yes    Take 1 tablet by mouth Daily.    Prenatal MV-Min-Fe Fum-FA-DHA (PRENATAL 1) 30-0.975-200 MG capsule 2/19/2020  No Yes    Take 1 tablet by mouth Daily.          Facility-Administered Medications as of 2/20/2020   Medication Dose Route Frequency Provider Last Rate Last Dose   • acetaminophen (TYLENOL) tablet 650 mg  650 mg Oral Q6H PRN Dora Mckeon MD       • [COMPLETED] acetaminophen (TYLENOL) tablet 650 mg  650 mg Oral Once Roxie Max MD   650 mg at 02/20/20 1306   • cyanocobalamin injection 1,000 mcg  1,000 mcg Intramuscular Daily Roxie Max MD   1,000 mcg at 02/20/20 1306   • [COMPLETED] diphenhydrAMINE (BENADRYL) capsule 25 mg  25 mg Oral Once Roxie Max MD   25 mg at 02/20/20 1307   • [COMPLETED] iron sucrose (VENOFER) 200 mg in sodium chloride 0.9 % 100 mL IVPB  200 mg Intravenous Once Roxie Max MD   Stopped at 02/20/20 1440   • magnesium oxide (MAG-OX) tablet 400 mg  400 mg Oral Daily Dora Mckeon MD   400 mg at 02/19/20 2246   • potassium chloride (KLOR-CON) packet 20 mEq  20 mEq Oral BID Dora Mckeon MD   20 mEq at 02/19/20 2222   • prenatal (CLASSIC) vitamin tablet 1 tablet  1 tablet Oral Daily Dora Mckeon MD   1 tablet at 02/19/20 2222   • sodium chloride 0.9 % flush 10 mL  10 mL Intravenous Q12H Roxie Max MD       •  sodium chloride 0.9 % flush 10 mL  10 mL Intravenous PRN Roxie Max MD       • zolpidem (AMBIEN) tablet 5 mg  5 mg Oral Nightly PRN Dora Mckeon MD         ,     ,   Medication Administration Report for Chiquita Galvez as of 02/20/20 1514   Legend:    Given Hold Not Given Due Canceled Entry Other Actions    Time Time (Time) Time  Time-Action       Discontinued   Completed   Future   MAR Hold    Linked        Medications 02/19/20 02/20/20             Orders (last 48 hrs)      Start     Ordered    02/21/20 0600  CBC & Differential  Morning Draw      02/20/20 1134    02/21/20 0600  Comprehensive Metabolic Panel  Morning Draw      02/20/20 1134    02/21/20 0600  Lactate Dehydrogenase  Morning Draw      02/20/20 1134    02/21/20 0600  Reticulocytes  Morning Draw      02/20/20 1134    02/20/20 1309  Diet Regular  Diet Effective Now     Comments:  Chicken fingers, grilled cheese, fruit cup, sugar cookie, iceberg lettuce salad with ranch dressing, eggs, tomatoes, cheese and ketchup and honey mustard    02/20/20 1310    02/20/20 1230  iron sucrose (VENOFER) 200 mg in sodium chloride 0.9 % 100 mL IVPB  Once      02/20/20 1134    02/20/20 1230  acetaminophen (TYLENOL) tablet 650 mg  Once      02/20/20 1137    02/20/20 1230  diphenhydrAMINE (BENADRYL) capsule 25 mg  Once      02/20/20 1137    02/20/20 1230  famotidine (PEPCID) injection 20 mg  2 Times Daily,   Status:  Discontinued      02/20/20 1137    02/20/20 1230  sodium chloride 0.9 % flush 10 mL  Every 12 Hours Scheduled      02/20/20 1139    02/20/20 1200  cyanocobalamin injection 1,000 mcg  Daily      02/20/20 1110    02/20/20 1141  Type & Screen  Once      02/20/20 1140    02/20/20 1139  Insert Peripheral IV  Once      02/20/20 1139    02/20/20 1139  PERIPHERAL IV CARE - Connectors / Hubs Must Be Scrubbed 15 Seconds Using 70% Alcohol & Allowed to Dry Before Accessing Line  Continuous      02/20/20 1139    02/20/20 1138  sodium chloride 0.9 % flush 10 mL   As Needed      02/20/20 1139    02/20/20 1134  Reticulocytes  Once      02/20/20 1134    02/20/20 1134  Lactate Dehydrogenase  Once      02/20/20 1134    02/20/20 1132  Please give Tylenol 650 mg p.o., Benadryl 25 mg p.o. and Pepcid 50 mg IV if okay with her OB/GYN prior to giving IV Venofer today.  Also start her B12 injections 1000 mcg IM once daily for 5 days and then once a week for 4 weeks and then once a mo...  Once     Comments:  Please give Tylenol 650 mg p.o., Benadryl 25 mg p.o. and Pepcid 50 mg IV if okay with her OB/GYN prior to giving IV Venofer today.  Also start her B12 injections 1000 mcg IM once daily for 5 days and then once a week for 4 weeks and then once a month.    02/20/20 1134    02/20/20 0933  Hematology & Oncology Inpatient Consult  IN AM     Comments:  Answering service for CBC, Diony, answered and will leave a page/message with Dr. Garcias about need for consult. This occurred on 2-20-20 at 0635   Specialty:  Hematology and Oncology  Provider:  Jimmy Navarro II, MD    02/20/20 0934    02/20/20 0915  ECG 12 Lead  STAT      02/20/20 0914    02/20/20 0913  Toxicology Screen, Serum  Once      02/20/20 0913    02/20/20 0912  Inpatient Infectious Diseases Consult  IN AM     Comments:  Answering service -Leodan to contact MD. Was notified of need of consult on 2-20-20 at 0620   Specialty:  Infectious Diseases  Provider:  Solitario Harrell MD    02/20/20 0914    02/20/20 0908  Transfer Patient  Once      02/20/20 0907    02/20/20 0903  URINE DRUG SCREEN PLUS BUPRENORPHINE -  Once,   Status:  Canceled      02/20/20 0903    02/20/20 0903  Urine Drug Screen - Urine, Clean Catch  PROCEDURE ONCE,   Status:  Canceled      02/20/20 0904    02/20/20 0903  Buprenorphine Screen Urine - Urine, Clean Catch  PROCEDURE ONCE,   Status:  Canceled      02/20/20 0904    02/20/20 0800  Fetal Nonstress Test  2 Times Daily     Comments:  Patient presents with:  Elevated Blood Pressure: sent from office for  protienuria, pt states she has low iron and needs to repeat a GTT. +FM, denies LOF or VB, c/o frontal headache      02/19/20 1826 02/20/20 0702  Inpatient Infectious Diseases Consult  IN AM,   Status:  Canceled     Specialty:  Infectious Diseases  Provider:  (Not yet assigned)    02/19/20 1826 02/20/20 0702  Hematology & Oncology Inpatient Consult  IN AM,   Status:  Canceled     Specialty:  Hematology and Oncology  Provider:  (Not yet assigned)    02/19/20 1942 02/20/20 0702  Inpatient Infectious Diseases Consult  IN AM,   Status:  Canceled     Comments:  Answering service -Leodan to contact MD. Was notified of need of consult on 2-20-20 at 0620   Specialty:  Infectious Diseases  Provider:  (Not yet assigned)    02/20/20 0635    02/20/20 0702  Hematology & Oncology Inpatient Consult  IN AM,   Status:  Canceled     Comments:  Answering service for CBC, Diony, answered and will leave a page/message with Dr. Garcias about need for consult. This occurred on 2-20-20 at 0635   Specialty:  Hematology and Oncology  Provider:  (Not yet assigned)    02/20/20 0635    02/20/20 0640  Inpatient Maternal & Fetal Medicine Consult  Once     Comments:  Called answering service to verify that this pt. Is in need of an MFM consult with her US this morning. Katya, answering service took call and stated she would leave a message for the NATTY to make sure Dr. Frazier was notified by Dr. Mckeon of need for consult   Specialty:  Maternal and Fetal Medicine  Provider:  (Not yet assigned)    02/20/20 0641    02/20/20 0600  Glucose Tolerance, 3 Hours  Morning Draw      02/19/20 1826 02/20/20 0600  Iron Profile  Morning Draw      02/19/20 1955 02/20/20 0600  Vitamin B12  Morning Draw      02/19/20 1955 02/20/20 0600  Folate  Morning Draw      02/19/20 1955 02/20/20 0600  Basic Metabolic Panel  Morning Draw      02/19/20 1957 02/20/20 0600  GTT Fasting  PROCEDURE ONCE,   Status:  Canceled      02/20/20 0003    02/20/20 0600   GTT 1 Hour  PROCEDURE ONCE      02/20/20 0003    02/20/20 0600  GTT 2 Hour  PROCEDURE ONCE      02/20/20 0003    02/20/20 0600  GTT 3 Hour  PROCEDURE ONCE      02/20/20 0003 02/19/20 2100  potassium chloride (KLOR-CON) packet 20 mEq  2 Times Daily      02/19/20 1957 02/19/20 2045  Tdap (BOOSTRIX) injection 0.5 mL  Once,   Status:  Discontinued      02/19/20 1957 02/19/20 2030  magnesium oxide (MAG-OX) tablet 400 mg  Daily      02/19/20 1942 02/19/20 2030  prenatal (CLASSIC) vitamin tablet 1 tablet  Daily      02/19/20 1942 02/19/20 2008  Admit To Obstetrics Inpatient  Once      02/19/20 2007 02/19/20 1952  Hgb. Frac. Profile  Once      02/19/20 1955 02/19/20 1946  acetaminophen (TYLENOL) tablet 650 mg  Every 6 Hours PRN      02/19/20 1947 02/19/20 1944  zolpidem (AMBIEN) tablet 5 mg  Nightly PRN      02/19/20 1947 02/19/20 1942  Code Status and Medical Interventions:  Continuous      02/19/20 1942    02/19/20 1942  Place Sequential Compression Device  Once      02/19/20 1942 02/19/20 1942  Maintain Sequential Compression Device  Continuous      02/19/20 1942 02/19/20 1827  Diet Regular  Diet Effective Now,   Status:  Canceled     Comments:  NPO after MN    02/19/20 1826    02/19/20 1826  Protein / Creatinine Ratio, Urine - Urine, Clean Catch  Once      02/19/20 1826 02/19/20 1825  Inpatient Maternal & Fetal Medicine Consult  Once,   Status:  Canceled     Specialty:  Maternal and Fetal Medicine  Provider:  (Not yet assigned)    02/19/20 1826 02/19/20 1824  [facility]  1 Time Imaging      02/19/20 1826 02/19/20 1824  Protein, Urine, 24 Hour - Urine, Clean Catch  Once      02/19/20 1826    02/19/20 1823  Comprehensive Metabolic Panel  Once      02/19/20 1826    02/19/20 1823  CBC & Differential  Once      02/19/20 1826 02/19/20 1823  Manual Differential  PROCEDURE ONCE      02/19/20 1826    02/19/20 1823  CBC Auto Differential  PROCEDURE ONCE       02/19/20 1826    02/19/20 1733  Initiate Observation Status  Once     Comments:  No chief complaint on file.        02/19/20 1732    02/19/20 1733  Vital Signs Per Hospital Policy  Per Hospital Policy      02/19/20 1732    02/19/20 1733  Continuous Fetal Monitoring With NST on Admission and Prior to Initiation of Oxytocin.  Per Order Details,   Status:  Canceled     Comments:  Continuous Fetal Monitoring With NST on Admission & Prior to Initiation of Oxytocin.    02/19/20 1732    02/19/20 1733  External Uterine Contraction Monitoring  Per Hospital Policy,   Status:  Canceled      02/19/20 1732    02/19/20 1733  Notify Provider (Specified)  Until Discontinued      02/19/20 1732 02/19/20 1733  Notify Provider of Tachysystole (Per Hospital Algorithm)  Until Discontinued      02/19/20 1732 02/19/20 1733  Notify Provider if Membranes Ruptured, Bleeding Greater Than 1 Pad Per Hour, Fetal Heart Tone Abnormality or Severe Pain  Until Discontinued      02/19/20 1732    02/19/20 1733  NPO Diet NPO Except: Ice Chips  Diet Effective Now,   Status:  Canceled      02/19/20 1732    Unscheduled  Change Peripheral IV Site  As Needed     Comments:  Frequency Per Facility Policy    02/20/20 1139    Unscheduled  PERIPHERAL IV CARE - Change Dressing As Needed When Damp, Loose or Soiled  As Needed      02/20/20 1139                 Respiratory Therapy (last 48 hours)      Respiratory Assessment     Row Name 02/20/20 1448 02/20/20 1446 02/20/20 1431 02/20/20 1428 02/20/20 1418       Oxygen Therapy    SpO2  100 %  --  --  100 %  100 %       Vital Signs    Pulse  88  88  82  85  105    BP  --  94/51  107/57  --  --       Respiratory    Respiratory WDL  --  --  --  --  WDL;breath sounds;effort/expansion;rhythm/pattern    Rhythm/Pattern, Respiratory  --  --  --  --  unlabored;pattern regular;depth regular    Expansion/Accessory Muscles/Retractions  --  --  --  --  no use of accessory muscles;no retractions;expansion symmetric    Cough  And Deep Breathing  --  --  --  --  done independently per patient       Interventions    Trust Relationship/Rapport  --  --  --  --  care explained;choices provided;emotional support provided;empathic listening provided;questions answered;questions encouraged;reassurance provided;thoughts/feelings acknowledged    Row Name 02/20/20 1416 02/20/20 1413 02/20/20 1408 02/20/20 1401 02/20/20 1323       Oxygen Therapy    SpO2  --  100 %  100 %  --  --       Vital Signs    Temp  --  --  --  --  98.1 °F (36.7 °C)    Temp src  --  --  --  --  Oral    Pulse  84  97  90  101  90    Heart Rate Source  --  --  --  Monitor  Monitor    Resp  --  --  --  16  17    Resp Rate Source  --  --  --  Visual  Visual    BP  112/66  --  --  115/71  96/60    BP Location  --  --  --  Right arm  Right arm    BP Method  --  --  --  Automatic  Automatic    Patient Position  --  --  --  Sitting  Sitting    [place]rake Name 02/20/20 1114 02/20/20 1100 02/20/20 1048 02/20/20 1044 02/20/20 1033       Oxygen Therapy    SpO2  --  100 %  100 %  --  100 %       Vital Signs    Pulse  93  105  90  96  100    Heart Rate Source  --  Monitor  --  --  --    Resp  --  16  --  --  --    Resp Rate Source  --  Visual  --  --  --    BP  90/58  111/73  --  95/72  --    BP Location  --  Right arm  --  --  --    BP Method  --  Automatic  --  --  --    Patient Position  --  Sitting  --  --  --    Row Name 02/20/20 1029 02/20/20 1018 02/20/20 1014 02/20/20 0958 02/20/20 0943       Oxygen Therapy    SpO2  --  100 %  --  100 %  100 %       Vital Signs    Pulse  94  95  92  96  103    BP  95/65  --  103/65  93/62  99/65    Row Name 02/20/20 0932 02/20/20 0929 02/20/20 0915 02/20/20 0854 02/20/20 0852       Oxygen Therapy    SpO2  100 %  --  --  100 %  100 %       Vital Signs    Temp  --  --  97.9 °F (36.6 °C)  --  --    Temp src  --  --  Oral  --  --    Pulse  117  104  --  (!) 133  (!) 147    Resp Rate Source  --  --  Visual  --  --    BP  --  95/64  103/62  --  --    Row Name  02/20/20 0851 02/20/20 0850 02/20/20 0849 02/20/20 0848 02/20/20 0847       Oxygen Therapy    SpO2  --  100 %  --  100 %  --       Vital Signs    Pulse  (!) 152  (!) 127  (!) 126  (!) 137  (!) 140    Heart Rate Source  Monitor  --  --  --  Monitor    BP  --  100/60  --  --  --    Row Name 02/20/20 0846 02/20/20 0835 02/19/20 2135 02/19/20 1914 02/19/20 1900       Oxygen Therapy    SpO2  100 %  100 %  100 %  --  --    Pulse Oximetry Type  --  Intermittent  Intermittent  --  --    Device (Oxygen Therapy)  --  room air  room air  --  --       Vital Signs    Temp  --  98.2 °F (36.8 °C)  97.7 °F (36.5 °C)  --  --    Temp src  --  Oral  Oral  --  --    Pulse  (!) 123  (!) 123  89  88  93    Heart Rate Source  --  Monitor  Monitor  --  --    Resp  --  14  18  --  --    Resp Rate Source  --  Visual  Visual  --  --    BP  --  97/59  104/62  102/52  115/66    BP Location  --  Right arm  Right arm  --  --    BP Method  --  Automatic  Automatic  --  --    Patient Position  --  Sitting  Sitting  --  --       Respiratory    Respiratory WDL  --  --  WDL  --  --       Interventions    Family/Support System Care  --  --  support provided  --  --    Head of Bed (HOB)  --  --  HOB at 45 degrees  --  --    Body Position  --  --  sitting up in bed  --  --    Trust Relationship/Rapport  --  --  care explained  --  --    Row Name 02/19/20 1844 02/19/20 1828 02/19/20 1814 02/19/20 1800 02/19/20 1758       Vital Signs    Pulse  102  118  100  --  98    Heart Rate Source  --  --  --  --  Monitor    Resp  --  --  --  --  16    Resp Rate Source  --  --  --  --  Visual    BP  109/68  118/82  114/69  --  114/64       Respiratory    Respiratory WDL  --  --  --  WDL  --       Interventions    Family/Support System Care  --  --  --  involvement promoted  --    Trust Relationship/Rapport  --  --  --  care explained;choices provided;emotional support provided;empathic listening provided;questions answered;questions encouraged;reassurance  provided;thoughts/feelings acknowledged  --    Row Name 20 1269                   Vital Signs    Temp  98.4 °F (36.9 °C)        Temp src  Oral               Physician Progress Notes (last 48 hours) (Notes from 20 1515 through 20 1515)      Jimmy Burris MD at 20 1401          ANTEPARTUM ROUNDING NOTE     Admission date 2020     Patient: Chiquita Galvez MRN: [id number]   YOB: 1995 Age: 24 y.o. Sex: female     Chief Complaint   Patient presents with   • Elevated Blood Pressure     sent from office for protienuria, pt states she has low iron and needs to repeat a GTT. +FM, denies LOF or VB, c/o frontal headache       HPI:    Chiquita Galvez is a 24 y.o.,  AT 33w5d admitted for evaluation of  contractions yesterday.. Denies vaginal bleeding, leakage of fluid, or contractions. Admits to good fetal movement.   This pregnancy is complicated.  She was transferred to Dr. Ferguson's care in the third trimester.  She was apparently treated for syphilis early in the pregnancy and in review of records it looks like she was treated appropriately and needs no further work-up or treatment at this time she was apparently found to be severely anemic, laboratory evaluation since hospitalization yesterday shows a low ferritin and suspect this is iron deficient as well as a low level of B12..  Initial protein creatinine ratio was normal but 24-hour urine is pending.    Patient Active Problem List   Diagnosis   • Maternal care due to low transverse uterine scar from previous  delivery   • History of inadequate prenatal care   • Syphilis of mother during pregnancy   • History of  labor   • Tobacco use during pregnancy, antepartum   • Pregnancy   • Proteinuria affecting pregnancy in third trimester   • Hypokalemia     OB History    Para Term  AB Living   7 5 4 1 1 5   SAB TAB Ectopic Molar Multiple Live Births   1         5      # Outcome Date GA Lbr  Andrew/2nd Weight Sex Delivery Anes PTL Lv   7 Current            6  17   1899 g (4 lb 3 oz) F CS-LTranv   RUTHY   5 Term 16   3629 g (8 lb) M Vag-Spont   RUTHY   4 Term 14   3345 g (7 lb 6 oz) F Vag-Spont   RUTHY   3 Term 13   3685 g (8 lb 2 oz) M Vag-Spont   RUTHY   2 Term 12   3714 g (8 lb 3 oz) M Vag-Spont   RUTHY   1 SAB  16w0d             Birth Comments: required D&C for retained placenta     Past Medical History:   Diagnosis Date   • Migraine    • Syphilis 2019    [facility]     Past Surgical History:   Procedure Laterality Date   •  SECTION       No current facility-administered medications on file prior to encounter.      Current Outpatient Medications on File Prior to Encounter   Medication Sig Dispense Refill   • calcium carbonate (TUMS) 500 MG chewable tablet Chew 1 tablet Daily.     • magnesium oxide (MAG-OX) 400 MG tablet Take 1 tablet by mouth Daily. 30 tablet 2   • Prenatal MV-Min-Fe Fum-FA-DHA (PRENATAL 1) 30-0.975-200 MG capsule Take 1 tablet by mouth Daily. 30 capsule 6       ROS:      Except as outlined in history of physical illness, patient denies any changes in her GYN, , GI systems. All other systems reviewed are negative.      OBJECTIVE:     Vitals:   Vitals:    20 1048 20 1100 20 1114 20 1323   BP:  111/73 90/58 96/60   BP Location:  Right arm  Right arm   Patient Position:  Sitting  Sitting   Pulse: 90 105 93 90   Resp:  16  17   Temp:    98.1 °F (36.7 °C)   TempSrc:    Oral   SpO2: 100% 100%     Weight:       Height:             Appearance/Psychiatric: In no distress   Constitutional: The patient is well nourished   Cardiovascular: She does not have edema. Heart RRR  Respiratory: Respiratory effort is normal. CTAB   Abdomen: Soft, gravid.  Ext: nontender, no edema. +2/4 bilateral patellar reflexes   Cx; deferred       LOS: 1 day    Jimmy Burris MD   ,  2020    Assessment and Plan:   Pregnancy [Z34.90]  Pregnancy [Z34.90]     33-week 5-day intrauterine pregnancy    Inadequate prenatal care with transfer to our clinic in the third trimester    Nursing reported that patient had a visitor this morning and shortly thereafter her behavior and responsiveness was noted to be diminished or changed.  By the time I saw patient, she was alert oriented eating and conversing in a normal fashion.  Toxicology screen was sent but will not be back today.  Clinically patient looks normal at present    Syphilis treated early in the pregnancy.  Infectious disease consult performed and feel that treatment was adequate and no further testing or treatment needs to be performed at this stage of the game.  Would recommend repeating an RPR at 6 months.    Severe anemia-as outlined above, low vitamin B 12 and low ferritin as well as low hemoglobin and hematocrit.  We will give B12 injections for the next 5 days then once worked a week for a month and then monthly thereafter.  IV iron Venofer is ordered and patient has been premedicated    Proteinuria in the office-normal protein creatinine ratio, 24-hour urine is pending and should be completed by this evening     contractions-resolved    Small for gestational age-yesterday's BPP was normal, MFM consult and repeat ultrasound with BPP was performed today.  Those results are pending    3-hour glucose tolerance test completed and normal.  1 hour glucose challenge was only mildly abnormal at 136.            Electronically signed by Jimmy Burris MD at 20 1413          Consult Notes (last 48 hours) (Notes from 20 1515 through 20 1515)      Dusty Diana MD at 20 1125      Consult Orders    1. Inpatient Infectious Diseases Consult [[id number]] ordered by Jimmy Burris MD at 20 0914                Referring Provider: Bisi Bermudez MD  Reason for Consultation:   Chief Complaint   Patient  presents with   • Elevated Blood Pressure     sent from office for protienuria, pt states she has low iron and needs to repeat a GTT. +FM, denies LOF or VB, c/o frontal headache         Subjective   History of present illness:   This very nice 24-year-old we are asked to provide evaluation opinion regarding syphilis.    Patient is 33 weeks and 5 days pregnant and was admitted for proteinuria, iron deficiency and a repeat glucose tolerance test.  While admitted, we have been asked to comment on syphilis.  She says she was diagnosed with syphilis last month incidentally on routine prenatal testing.  She denies any symptoms.  She has 5 children and last delivered about 3 years ago and thinks she was negative for syphilis at that time.  [facility] and received 3 weekly doses of IM penicillin.  She tolerated these well and last dose was 2020.  Other than some dyspnea, she feels fine    Past Medical History:   Diagnosis Date   • Migraine    • Syphilis 2019    FTA positive during pregnancy[facility]       Past Surgical History:   Procedure Laterality Date   •  SECTION       No family history of infectious diseases  Social history: She lives here in [place] with her sister.    No Known Allergies    Review of Systems  Pertinent items are noted in HPI, all other systems reviewed and negative    Objective     Physical Exam:   Vital Signs   Temp:  [97.7 °F (36.5 °C)-98.4 °F (36.9 °C)] 97.9 °F (36.6 °C)  Heart Rate:  [] 100  Resp:  [14-18] 14  BP: ()/(52-82) 95/65    GENERAL: Awake and alert, in no acute distress.   HEENT: Oropharynx is clear. Hearing is grossly normal.   EYES: PERRL. No conjunctival injection. No lid lag.   LYMPHATICS: No lymphadenopathy of the neck or inguinal regions.   HEART: Regular rate and regular rhythm. No peripheral edema.   LUNGS: Clear to auscultation anteriorly with normal respiratory effort.   GI: Soft,  nontender, distended consistent with third trimester pregnancy; no appreciable organomegaly.   SKIN: Warm and dry without cutaneous eruptions   PSYCHIATRIC: Appropriate mood, affect, insight, and judgment.     Results Review:  2020 RPR 1:64   RPR 1:16  HIV negative    Radiology:      Assessment/Plan   1.  Late latent syphilis: She has received appropriate treatment with q. weekly [facility] x[facility].  She has already been told she needs to have repeat RPR in 6 months.  She has not had long enough infection to have tertiary syphilis which normally takes 5 to 20 years of untreated infection before developing tertiary symptoms. Treatment is still with IM penicillin.  Therefore I recommend no additional antibiotics or testing except for repeat RPR in 6 months at the [facility]     Thank you for this consult.  We will be happy to reevaluate anytime you need      Dusty Diana MD  20  11:25 AM          Electronically signed by Dusty Diana MD at 20 1131     Roxie Max MD at 20 1114            Subjective     REASON FOR CONSULTATION:  1.   7, para 4, 33 weeks and 4 days pregnant    2.  History of syphilis, infectious disease been consulted.  Patient received 3 penicillin injections by the [facility]    3.  Anemia likely secondary to iron deficiency and B12 deficiency.    Provide an opinion on any further workup or treatment                             REQUESTING PHYSICIAN: Dr. John cardiac    RECORDS OBTAINED:  Records of the patients history including those obtained from the referring provider were reviewed and summarized in detail.    HISTORY OF PRESENT ILLNESS:  The patient is a 24 y.o. year old female who is here for an opinion about the above issue.    History of Present Illness patient is a 24-year-old female  7 para 4, currently 33 weeks and 4 days pregnant who is here today.  She had complaints of  headache and she had protein in the urine.  Patient did not get prenatal care early on.  She had one visit at 29 weeks with Devi then was transferred to the [facility] last week.  She has diagnosis with syphilis at 29 weeks and she has had 3 penicillin injections [facility] the [facility].  Her initial RPR titer was 1 is 264 and recheck last week was 1 is 216.  Her previous obstetric/gynecological history is noted for 4 vaginal deliveries and 1 .  Her last pregnancy was at 34 weeks.  She presented with  labor but had urgent C-sections due to nonreassuring fetal status.  She declined tubal sterilization this time.  She has quit smoking this pregnancy.  Her hemoglobin was 8.3 last week and today is 7.1.  She has been taking 1 iron pill a day.  Her glucose was elevated at 136.  Her liver function tests are normal.    We were consulted because she was anemic.  She is found to be iron deficient as well as B12 deficient.  Her B12 level is 231.  She will require IV Venofer as she cannot tolerate oral iron and start on B12 injections.    Patient is receiving 25 urine for protein.  They have requested an infectious disease consult.  We have been consulted in order to see if she is eligible for IV iron infusions.    When they were doing the 3-hour glucose tolerance test she became tachycardic in the 120s and was transferred to the labor and delivery for observation and now her EKG was done which showed heart rate of 99 normal sinus rhythm    Past Medical History:   Diagnosis Date   • Migraine    • Syphilis 2019    FTA positive during [facility]        Past Surgical History:   Procedure Laterality Date   •  SECTION          No current facility-administered medications on file prior to encounter.      Current Outpatient Medications on File Prior to Encounter   Medication Sig Dispense Refill   • calcium carbonate (TUMS) 500 MG chewable tablet Chew 1 tablet Daily.     •  magnesium oxide (MAG-OX) 400 MG tablet Take 1 tablet by mouth Daily. 30 tablet 2   • Prenatal MV-Min-Fe Fum-FA-DHA (PRENATAL 1) 30-0.975-200 MG capsule Take 1 tablet by mouth Daily. 30 capsule 6        ALLERGIES:  No Known Allergies     Social History     Socioeconomic History   • Marital status: Single     Spouse name: Not on file   • Number of children: Not on file   • Years of education: Not on file   • Highest education level: Not on file   Tobacco Use   • Smoking status: Former Smoker     Types: Cigars     Last attempt to quit: 2019     Years since quittin.2   • Smokeless tobacco: Never Used   Substance and Sexual Activity   • Alcohol use: Never     Frequency: Never   • Drug use: Never   • Sexual activity: Yes     Partners: Male     Birth control/protection: None        History reviewed. No pertinent family history.     Review of Systems   Constitutional: Positive for fatigue. Negative for appetite change, chills, diaphoresis, fever and unexpected weight change.   HENT: Negative for hearing loss, sore throat and trouble swallowing.    Respiratory: Negative for cough, chest tightness, shortness of breath and wheezing.    Cardiovascular: Negative for chest pain, palpitations and leg swelling.   Gastrointestinal: Negative for abdominal distention, abdominal pain, constipation, diarrhea, nausea and vomiting.   Genitourinary: Negative for dysuria, frequency, hematuria and urgency.   Musculoskeletal: Negative for joint swelling.        No muscle weakness.   Skin: Negative for rash and wound.   Neurological: Negative for seizures, syncope, speech difficulty, weakness, numbness and headaches.   Hematological: Negative for adenopathy. Does not bruise/bleed easily.   Psychiatric/Behavioral: Negative for behavioral problems, confusion and suicidal ideas.        Objective     Vitals:    20 0854 20 0915 20 1029 20 1033   BP:  103/62 95/65    BP Location:       Patient Position:       Pulse:  (!) 133  94 100   Resp:       Temp:  97.9 °F (36.6 °C)     TempSrc:  Oral     SpO2: 100%   100%   Weight:       Height:         No flowsheet data found.    Physical Exam      GENERAL:  Well-developed, well-nourished in no acute distress.   NECK:  Supple with good range of motion; no thyromegaly or masses, no JVD.  LYMPHATICS:  No cervical, supraclavicular, axillary or inguinal adenopathy.  CHEST:  Lungs clear to auscultation. Good airflow.  CARDIAC:  Regular rate and rhythm without murmurs, rubs or gallops. Normal S1,S2.  ABDOMEN:  Soft, nontender with no hepatosplenomegaly or masses.  EXTREMITIES:  No clubbing, cyanosis or edema.  NEUROLOGICAL:  Cranial Nerves II-XII grossly intact.  No focal neurological deficits.  PSYCHIATRIC:  Normal affect and mood.        RECENT LABS:  Hematology WBC   Date Value Ref Range Status   2020 8.29 3.40 - 10.80 10*3/mm3 Final     RBC   Date Value Ref Range Status   2020 3.88 3.77 - 5.28 10*6/mm3 Final     Hemoglobin   Date Value Ref Range Status   2020 7.1 (L) 12.0 - 15.9 g/dL Final     Hematocrit   Date Value Ref Range Status   2020 24.2 (L) 34.0 - 46.6 % Final     Platelets   Date Value Ref Range Status   2020 236 140 - 450 10*3/mm3 Final          Assessment/Plan     1.  Multifactorial anemia with iron deficiency and B12 deficiency. .  Patient has been taking oral iron 1 a day but it causes her constipation and nausea and she is not able to tolerate oral iron.  Her B12 level was 231.  I have reviewed her iron profile and she is iron deficient and she will require IV Venofer.  I discussed IV Venofer with the patient as well as starting B12 injections.    2.   7 para 4 now 33 weeks and 4 days pregnant.  Patient has not been compliant with her prenatal care.    3.  History of syphilis for which [facility] and infectious diseases been consulted.    4.  Proteinuria, 24 urine protein is being pending.   Her blood pressure is stable.  She does not show signs of eclampsia according to the nurse.    Plan  1. Start IV Venofer daily times x3 doses  2. Start B12 injections 1000 mcg IM daily for 5 days and then once a week for 4 weeks and then once a month  3. No evidence of active bleeding.  4. Infectious disease consult obtained in order to evaluate her underlying syphilis status  5. 24 urine for protein pending  6. Will follow along with you.    Roxie Max MD              Electronically signed by Roxie Max MD at 02/20/20 8478

## 2020-02-20 NOTE — PROGRESS NOTES
ANTEPARTUM ROUNDING NOTE     Admission date 2020     Patient: Chiquita Galvez MRN: [id number]   YOB: 1995 Age: 24 y.o. Sex: female     Chief Complaint   Patient presents with   • Elevated Blood Pressure     sent from office for protienuria, pt states she has low iron and needs to repeat a GTT. +FM, denies LOF or VB, c/o frontal headache       HPI:    Chiquita Galvez is a 24 y.o.,  AT 33w5d admitted for evaluation of  contractions yesterday.. Denies vaginal bleeding, leakage of fluid, or contractions. Admits to good fetal movement.   This pregnancy is complicated.  She was transferred to Dr. Ferguson's care in the third trimester.  She was apparently treated for syphilis early in the pregnancy and in review of records it looks like she was treated appropriately and needs no further work-up or treatment at this time she was apparently found to be severely anemic, laboratory evaluation since hospitalization yesterday shows a low ferritin and suspect this is iron deficient as well as a low level of B12..  Initial protein creatinine ratio was normal but 24-hour urine is pending.    Patient Active Problem List   Diagnosis   • Maternal care due to low transverse uterine scar from previous  delivery   • History of inadequate prenatal care   • Syphilis of mother during pregnancy   • History of  labor   • Tobacco use during pregnancy, antepartum   • Pregnancy   • Proteinuria affecting pregnancy in third trimester   • Hypokalemia     OB History    Para Term  AB Living   7 5 4 1 1 5   SAB TAB Ectopic Molar Multiple Live Births   1         5      # Outcome Date GA Lbr Andrew/2nd Weight Sex Delivery Anes PTL Lv   7 Current            6  17   1899 g (4 lb 3 oz) F CS-LTranv   RUTHY   5 Term 16   3629 g (8 lb) M Vag-Spont   RUTHY   4 Term 14   3345 g (7 lb 6 oz) F Vag-Spont   RUTHY   3 Term 13   3685 g (8 lb 2 oz) M Vag-Spont   RUTHY   2 Term 12    3714 g (8 lb 3 oz) M Vag-Spont   RUTHY   1 SAB  16w0d             Birth Comments: required D&C for retained placenta     Past Medical History:   Diagnosis Date   • Migraine    • Syphilis 2019    [facility]     Past Surgical History:   Procedure Laterality Date   •  SECTION       No current facility-administered medications on file prior to encounter.      Current Outpatient Medications on File Prior to Encounter   Medication Sig Dispense Refill   • calcium carbonate (TUMS) 500 MG chewable tablet Chew 1 tablet Daily.     • magnesium oxide (MAG-OX) 400 MG tablet Take 1 tablet by mouth Daily. 30 tablet 2   • Prenatal MV-Min-Fe Fum-FA-DHA (PRENATAL 1) 30-0.975-200 MG capsule Take 1 tablet by mouth Daily. 30 capsule 6       ROS:      Except as outlined in history of physical illness, patient denies any changes in her GYN, , GI systems. All other systems reviewed are negative.      OBJECTIVE:     Vitals:   Vitals:    20 1048 20 1100 20 1114 20 1323   BP:  111/73 90/58 96/60   BP Location:  Right arm  Right arm   Patient Position:  Sitting  Sitting   Pulse: 90 105 93 90   Resp:  16  17   Temp:    98.1 °F (36.7 °C)   TempSrc:    Oral   SpO2: 100% 100%     Weight:       Height:             Appearance/Psychiatric: In no distress   Constitutional: The patient is well nourished   Cardiovascular: She does not have edema. Heart RRR  Respiratory: Respiratory effort is normal. CTAB   Abdomen: Soft, gravid.  Ext: nontender, no edema. +2/4 bilateral patellar reflexes   Cx; deferred       LOS: 1 day    Jimmy Burris MD   2020    Assessment and Plan:   Pregnancy [Z34.90]  Pregnancy [Z34.90]     33-week 5-day intrauterine pregnancy    Inadequate prenatal care with transfer to our clinic in the third trimester    Nursing reported that patient had a visitor this morning and shortly thereafter her behavior and responsiveness was noted to be  diminished or changed.  By the time I saw patient, she was alert oriented eating and conversing in a normal fashion.  Toxicology screen was sent but will not be back today.  Clinically patient looks normal at present    Syphilis treated early in the pregnancy.  Infectious disease consult performed and feel that treatment was adequate and no further testing or treatment needs to be performed at this stage of the game.  Would recommend repeating an RPR at 6 months.    Severe anemia-as outlined above, low vitamin B 12 and low ferritin as well as low hemoglobin and hematocrit.  We will give B12 injections for the next 5 days then once worked a week for a month and then monthly thereafter.  IV iron Venofer is ordered and patient has been premedicated    Proteinuria in the office-normal protein creatinine ratio, 24-hour urine is pending and should be completed by this evening     contractions-resolved    Small for gestational age-yesterday's BPP was normal, MFM consult and repeat ultrasound with BPP was performed today.  Those results are pending    3-hour glucose tolerance test completed and normal.  1 hour glucose challenge was only mildly abnormal at 136.

## 2020-02-20 NOTE — NURSING NOTE
Dr. Burris notified per telephone that when patient was assessed this morning, her heart rate is 120's-140's sitting in the bed. Blood pressure is 97/59 and temperature 98.2, O2 saturation at 100%. Patient states she feels nauseous and short of breath at this time. Patient presents to be alert when spoken to but while standing at bedside, patient's eyes keep closing and rolling back. Dr. Burris gave order to send patient to labor and delivery at this time. r/v

## 2020-02-20 NOTE — CONSULTS
Ms. Galvez is referred by Dr Mckeon for M consultation on indications of: hypertension, possible preeclampsia    She is accompanied by her sister, Ancelmo, who was present throughout the ultrasound exam and consultation.    Appreciate ID and Hematology consultations      24  at 33 5/7 per MARISA 20    Prenatal course is significant for  No prenatal care.  Suspected preeclampsia per hypertension and proteinuria  Prior  delivery  Syphilis see Dr. Diana's note (I.D.)     Pt denies headache today, but reports headache during the past week. She denies headache prior to 1 week ago     After drinking her glucola this morning, Ms. Galvez had tachycardia to the 130s as documented on EKG, which subsequently resolved.     PMH  OB History    Para Term  AB Living   7 5 4 1 1 5   SAB TAB Ectopic Molar Multiple Live Births   1 0 0 0 0 5      # Outcome Date GA Lbr Andrew/2nd Weight Sex Delivery Anes PTL Lv   7 Current            6  17   1899 g (4 lb 3 oz) F CS-LTranv   RUTHY   5 Term 16   3629 g (8 lb) M Vag-Spont   RUTHY   4 Term 14   3345 g (7 lb 6 oz) F Vag-Spont   RUTHY   3 Term 13   3685 g (8 lb 2 oz) M Vag-Spont   RUTHY   2 Term 12   3714 g (8 lb 3 oz) M Vag-Spont   RUTHY   1 SAB  16w0d             Birth Comments: required D&C for retained placenta   During the 2017 pregnancy, an abnormal BPP led to delivery which was at [facility].   Ms. Galvez states G1 was a miscarriage at 16 weeks. Early in the pregnancy a heartbeat was present.    Past Medical History:   Diagnosis Date   • Migraine    • Syphilis 2019    [facility]       No Known Allergies    Past Surgical History:   Procedure Laterality Date   •  SECTION  2017           Current Facility-Administered Medications:   •  acetaminophen (TYLENOL) tablet 650 mg, 650 mg, Oral, Q6H PRN, Dora Mckeon MD  •  acetaminophen (TYLENOL) tablet 650 mg, 650 mg,  Oral, Once, Roxie Max MD  •  cyanocobalamin injection 1,000 mcg, 1,000 mcg, Intramuscular, Daily, Roxie Max MD  •  diphenhydrAMINE (BENADRYL) capsule 25 mg, 25 mg, Oral, Once, Roxie Max MD  •  famotidine (PEPCID) injection 20 mg, 20 mg, Intravenous, BID, Roxie Max MD  •  iron sucrose (VENOFER) 200 mg in sodium chloride 0.9 % 100 mL IVPB, 200 mg, Intravenous, Once, Roxie Max MD  •  magnesium oxide (MAG-OX) tablet 400 mg, 400 mg, Oral, Daily, Dora Mckeon MD, 400 mg at 02/19/20 2246  •  potassium chloride (KLOR-CON) packet 20 mEq, 20 mEq, Oral, BID, Dora Mckeon MD, 20 mEq at 02/19/20 2222  •  prenatal (CLASSIC) vitamin tablet 1 tablet, 1 tablet, Oral, Daily, Dora Mckeon MD, 1 tablet at 02/19/20 2222  •  sodium chloride 0.9 % flush 10 mL, 10 mL, Intravenous, Q12H, Roxie Max MD  •  sodium chloride 0.9 % flush 10 mL, 10 mL, Intravenous, PRN, Roxie Max MD  •  zolpidem (AMBIEN) tablet 5 mg, 5 mg, Oral, Nightly PRN, Dora Mckeon MD      Family history is negative for mental retardation, trisomy 21, congenital heart disease, spina bifida, cystic fibrosis, muscular dystrophy, other birth defects, Anglican ancestry.   The patient has a family history of: none of the above.    Social history  Smoking status:  Quit at pregnancy diagnosis  Smokeless tobacco:   Alcohol use:  No  Street drug use:  Employment: [employer]; security    Px:  Vitals:    02/20/20 0915 02/20/20 1029 02/20/20 1033 02/20/20 1100   BP: 103/62 95/65  111/73   BP Location:    Right arm   Patient Position:    Sitting   Pulse:  94 100 105   Resp:    16   Temp: 97.9 °F (36.6 °C)      TempSrc: Oral      SpO2:   100% 100%   Weight:       Height:         Constitutional:    Pleasant cooperative BF in early third trimester, appears comfortable, not in labor.     Head: grossly normal  Neck no thyroid megaly    Abd:  Fundal height is AGA  Uterus is soft, nontender    Extremities:  Good  strength  No edema  DTRs flat      Labs    B12 230  H/H 7.1g/24%  Plt 236    AST  5  ALT  14    Urine P/Cr 0.15    EFM review:    Baseline  140  Variability  mod  Accels  present  Decels  10:14 to 90 x approximately 90 sec.   Ctx  None  Interpretation: reactive NST with decel at 10:14      See US report    Biometry and growth are consistent with the established MARISA of 20.  The EFW is at the 31st centile.  The AC is at the 44th centile.      Impression:  33 5/7 per MARISA 20    Normotensive at present, without signs of preeclampsia    Proteinuria assessment (24 hour urine) is currently underway.  Protein/creatinine ratio was normal.     Anemia of iron deficiency     Prior     S/p treatment for syphilis    Normal fetal growth    History of headaches during the past week, resolved at present.      Recommendations:    Await 24 hour proteinuria report    Given deceleration, recommend at least 24 hours of normal electronic fetal monitoring prior to hospital discharge.     Inform  and pediatric care providers of maternal treatment for syphilis    Evaluate interrval growth in 3 weeks.     Further evaluation and management of headache (eg neurology consultation) as clinically indicated.     Continue iron sucrose as per heme.    If hospital discharge ultimately appears appropriate, weekly prenatal appointment x 1-2 weeks minimum to further evaluate for hypertension.     Floor time 60 min.

## 2020-02-20 NOTE — PLAN OF CARE
Problem: Patient Care Overview  Goal: Plan of Care Review  Outcome: Ongoing (interventions implemented as appropriate)  Flowsheets  Taken 2/19/2020 2135  Plan of Care Reviewed With: patient;significant other  Taken 2/20/2020 3443  Outcome Summary: RNST, +FM, VSS, denies VB/LOF/contractions, pt. received Klor-Con, mag ox, and pnv last night before bed. pt. NPO after midnight. pt. to have labs drawn in the am including a fasting BS and then her 3hr GTT will begin after that. pt. to have an us done in the [facility] in the am with consult with Dr. Frazier about her syphillis infection. pt. to keep her STD confidential at this time

## 2020-02-20 NOTE — H&P
[facility]  Obstetric History and Physical    Chief Complaint   Patient presents with   • Elevated Blood Pressure     sent from office for protienuria, pt states she has low iron and needs to repeat a GTT. +FM, denies LOF or VB, c/o frontal headache       Subjective     Patient is a 24 y.o. female  currently at 33w4d, who presents to antepartum service from office today due to 2+protein in the urine. She also c/o a headache. She c/o some contractions and has h/o  labor.     Her prenatal care is complicated by late prenatal care. She had one visit at 29 weeks with [facility] then transferred to our group last week. She was dx with syphilis at 29 wks and she [facility]. Her initial RPR titer was 1:64 and recheck last week was 1:16.  Her previous obstetric/gynecological history is noted for 4 vaginal deliveries and 1 C/S. Her last pregnancy was at 34 weeks. She presented with  labor but had urgent C/S due to non-reassuring fetal status. OP note reviewed and LTCS was performed. Pt plans . She declines a tubal sterilization. She quit smoking this pregnancy. HGB last week was 8.3 and a ferritin was sent today in the office. Her 1hr glc was elevated at 136 and a 3hr GTT is ordered for tomorrow.     The following portions of the patients history were reviewed and updated as appropriate: current medications, allergies, past medical history, past surgical history, past family history, past social history and problem list .       Prenatal Information:  Prenatal Results     POC Urine Glucose/Protein     Test Value Reference Range Date Time    Urine Glucose Negative mg/dL Negative, 1000 mg/dL (3+) 20 1224    Urine Protein 2+ mg/dL Negative 20 1224          Initial Prenatal Labs     Test Value Reference Range Date Time    Hemoglobin        Hematocrit        Platelets 273 x10E3/uL 150 - 450 20 1026    Rubella IgG 13.20 index Immune >0.99 20  1026    Hepatitis B SAg Negative  Negative 02/12/20 1026      Nonreactive  Nonreactive 01/23/20 1211    Hepatitis C Ab 0.1 s/co ratio 0.0 - 0.9 02/12/20 1026      Nonreactive  Nonreactive 01/23/20 1211    RPR Reactive  Non Reactive 02/12/20 1026    ABO B   02/12/20 1026    Rh Positive   02/12/20 1026    Antibody Screen        HIV Non Reactive  Non Reactive 02/12/20 1026      Nonreactive  Nonreactive 01/23/20 1211    Urine Culture Final report   02/12/20 1053    Gonorrhea Negative  Negative 02/12/20 1053    Chlamydia Negative  Negative 02/12/20 1053    TSH 1.110 uIU/mL 0.270 - 4.200 02/12/20 1026          2nd and 3rd Trimester     Test Value Reference Range Date Time    Hemoglobin (repeated) 8.3 g/dL 11.1 - 15.9 02/12/20 1026    Hematocrit (repeated) 29.3 % 34.0 - 46.6 02/12/20 1026     mg/dL 65 - 179 02/12/20 1026    Antibody Screen (repeated) Negative  Negative 02/12/20 1026      Normal  Normal 02/12/20       Negative   01/23/20 1211    GTT Fasting        GTT 1 Hr        GTT 2 Hr        GTT 3 Hr        Group B Strep              Drug Screening     Test Value Reference Range Date Time    Amphetamine Screen Negative ng/mL Vluwel=5279 02/12/20 1054      Negative (arb'U) Negative 01/23/20 1211    Barbiturate Screen Negative ng/mL Mkevhv=096 02/12/20 1054    Benzodiazepine Screen Negative ng/mL Kriacb=623 02/12/20 1054    Methadone Screen Negative ng/mL Ueufaz=099 02/12/20 1054    Phencyclidine Screen Negative ng/mL Cutoff=25 02/12/20 1054    Opiates Screen Negative ng/mL Eodyng=752 02/12/20 1054    THC Screen Negative ng/mL Cutoff=50 02/12/20 1054      Negative  Negative 01/23/20 1211    Cocaine Screen Negative ng/mL Kdbwxt=397 02/12/20 1054      Negative  Negative 01/23/20 1211    Propoxyphene Screen Negative ng/mL Pjuhxy=453 02/12/20 1054    Buprenorphine Screen        Methamphetamine Screen        Oxycodone Screen        Tricyclic Antidepressants Screen              Other (Risk screening)     Test Value  Reference Range Date Time    Varicella IgG 3.2 AI  01/23/20 1211    Parvovirus IgG        CMV IgG        Cystic Fibrosis        Hemoglobin electrophoresis        NIPT        MSAFP-4        AFP (for NTD only)                  External Prenatal Results     Pregnancy Outside Results - Transcribed From Office Records - See Scanned Records For Details     Test Value Date Time    Hgb 8.3 g/dL 02/12/20 1026    Hct 29.3 % 02/12/20 1026    ABO B  02/12/20 1026    Rh Positive  02/12/20 1026    Antibody Screen Negative  02/12/20 1026      Normal  02/12/20       Negative  01/23/20 1211    Glucose Fasting GTT       Glucose Tolerance Test 1 hour       Glucose Tolerance Test 3 hour       Gonorrhea (discrete) Negative  02/12/20 1053    Chlamydia (discrete) Negative  02/12/20 1053    RPR Reactive  02/12/20 1026    VDRL       Syphilis Antibody Reactive  02/12/20 1026      >8.0 AI 01/23/20 1211    Rubella 13.20 index 02/12/20 1026    HBsAg Negative  02/12/20 1026      Nonreactive  01/23/20 1211    Herpes Simplex Virus PCR       Herpes Simplex VIrus Culture       HIV Non Reactive  02/12/20 1026      Nonreactive  01/23/20 1211    Hep C RNA Quant PCR       Hep C Antibody 0.1 s/co ratio 02/12/20 1026      Nonreactive  01/23/20 1211    AFP       Group B Strep       GBS Susceptibility to Clindamycin       GBS Susceptibility to Erythromycin       Fetal Fibronectin       Genetic Testing, Maternal Blood             Drug Screening     Test Value Date Time    Urine Drug Screen       Amphetamine Screen Negative ng/mL 02/12/20 1054      Negative (arb'U) 01/23/20 1211    Barbiturate Screen Negative ng/mL 02/12/20 1054    Benzodiazepine Screen Negative ng/mL 02/12/20 1054    Methadone Screen Negative ng/mL 02/12/20 1054    Phencyclidine Screen Negative ng/mL 02/12/20 1054    Opiates Screen Negative  01/23/20 1211    THC Screen       Cocaine Screen       Propoxyphene Screen Negative ng/mL 02/12/20 1054    Buprenorphine Screen       Methamphetamine  Screen       Oxycodone Screen       Tricyclic Antidepressants Screen                    Past OB History:     OB History    Para Term  AB Living   7 5 4 1 1 5   SAB TAB Ectopic Molar Multiple Live Births   1 0 0 0 0 5      # Outcome Date GA Lbr Andrew/2nd Weight Sex Delivery Anes PTL Lv   7 Current            6  17   1899 g (4 lb 3 oz) F CS-LTranv   RUTHY   5 Term 16   3629 g (8 lb) M Vag-Spont   RUTHY   4 Term 14   3345 g (7 lb 6 oz) F Vag-Spont   RUTHY   3 Term 13   3685 g (8 lb 2 oz) M Vag-Spont   RUTHY   2 Term 12   3714 g (8 lb 3 oz) M Vag-Spont   RUTHY   1 SAB  16w0d             Birth Comments: required D&C for retained placenta       Past Medical History: Past Medical History:   Diagnosis Date   • Migraine    • Syphilis     [facility]      Past Surgical History Past Surgical History:   Procedure Laterality Date   •  SECTION        Family History: History reviewed. No pertinent family history.   Social History:  reports that she quit smoking about 3 months ago. Her smoking use included cigars. She has never used smokeless tobacco.   reports that she does not drink alcohol.   reports that she does not use drugs.        General ROS: The following systems were reviewed and negative;  constitution, respiratory, cardiovascular, gastrointestinal, musculoskeletal, behavioral/psych, endocrine and allergies / immunologic    Objective       Vital Signs Range for the last 24 hours  Temperature: Temp:  [98.4 °F (36.9 °C)] 98.4 °F (36.9 °C)   Temp Source: Temp src: Oral   BP: BP: (102-118)/(52-82) 102/52   Pulse: Heart Rate:  [] 88   Respirations: Resp:  [16] 16   SPO2:     O2 Amount (l/min):     O2 Devices     Weight: Weight:  [63 kg (139 lb)-63.1 kg (139 lb 3.2 oz)] 63 kg (139 lb)     Physical Examination: General appearance - alert, well appearing, and in no distress  Mental status - alert, oriented to person,  place, and time  Abdomen - soft gravid NT  Back exam - full range of motion, no tenderness, palpable spasm or pain on motion  Neurological - alert, oriented, normal speech, no focal findings or movement disorder noted  Musculoskeletal - no joint tenderness, deformity or swelling  Extremities - peripheral pulses normal, no pedal edema, no clubbing or cyanosis  Skin - normal coloration and turgor, no rashes, no suspicious skin lesions noted    Presentation: vtx per US today   Cervix: Exam by:     Dilation:     Effacement:     Station:        NST category 1 tracing, reactive, toco- uterine irritability  Fetal Heart Rate Assessment   Method: Fetal HR Assessment Method: external   Beats/min: Fetal HR (beats/min): 140   Baseline: Fetal Heart Baseline Rate: normal range   Variability: Fetal HR Variability: moderate (amplitude range 6 to 25 bpm)   Accels: Fetal HR Accelerations: greater than/equal to 15 bpm, lasting at least 15 seconds   Decels: Fetal HR Decelerations: absent   Tracing Category:       Uterine Assessment   Method: Method: external tocotransducer   Frequency (min): Contraction Frequency (Minutes): 0   Ctx Count in 10 min: Contractions in 10 Minutes: 0   Duration:     Intensity: Contraction Intensity: no contractions   Intensity by IUPC:     Resting Tone: Uterine Resting Tone: soft by palpation   Resting Tone by IUPC:     [place] Units:       Laboratory Results: see prenatal labs  Radiology Review: US today in office (see media)  Other Studies:      Assessment/Plan       Maternal care due to low transverse uterine scar from previous  delivery    History of inadequate prenatal care    Syphilis of mother during pregnancy    History of  labor    Pregnancy    Proteinuria affecting pregnancy in third trimester        Assessment:  1.  Intrauterine pregnancy at 33w4d gestation with reassuring fetal status.    2. No evidence of  labor per tracing currently  3.  Obstetrical history significant  for newly dx syphilis  4.  GBS status: No results found for: STREPGPB    Plan:  1. Admit to antepartum service for 24 hr urine due to proteinuria and HA. Pt may be too unreliable to complete at home. I spoke w/ Dr Frazier of [facility] and requested an M consult due to syphilis. He will see her tomorrow. He requested that ID also be consulted to make sure there is no evidence of tertiary syphilis. Will check ferritin in AM and if low, will order IV iron for severe anemia. Will order 3hr GTT while in hospital tomorrow. Magnesium oxide started last week as outpt for headaches.  2. Plan of care has been reviewed with patient and patient and Dr rFazier  3.  Risks, benefits of treatment plan have been discussed.  4.  All questions have been answered.        Dora Mckeon MD  2/19/2020  7:30 PM

## 2020-02-21 ENCOUNTER — TELEPHONE (OUTPATIENT)
Dept: ONCOLOGY | Facility: CLINIC | Age: 25
End: 2020-02-21

## 2020-02-21 VITALS
BODY MASS INDEX: 24.63 KG/M2 | HEART RATE: 94 BPM | DIASTOLIC BLOOD PRESSURE: 59 MMHG | RESPIRATION RATE: 16 BRPM | OXYGEN SATURATION: 99 % | WEIGHT: 139 LBS | SYSTOLIC BLOOD PRESSURE: 94 MMHG | HEIGHT: 63 IN | TEMPERATURE: 97.8 F

## 2020-02-21 PROBLEM — O34.219 PATIENT DESIRES VAGINAL BIRTH AFTER CESAREAN SECTION (VBAC): Status: ACTIVE | Noted: 2020-02-21

## 2020-02-21 PROBLEM — O09.299 HISTORY OF POSTPARTUM HEMORRHAGE, CURRENTLY PREGNANT: Status: ACTIVE | Noted: 2020-02-21

## 2020-02-21 LAB
ALBUMIN SERPL-MCNC: 2.9 G/DL (ref 3.5–5.2)
ALBUMIN/GLOB SERPL: 1.2 G/DL
ALP SERPL-CCNC: 101 U/L (ref 39–117)
ALT SERPL W P-5'-P-CCNC: <5 U/L (ref 1–33)
ANION GAP SERPL CALCULATED.3IONS-SCNC: 11.4 MMOL/L (ref 5–15)
AST SERPL-CCNC: 12 U/L (ref 1–32)
BASOPHILS # BLD AUTO: 0.05 10*3/MM3 (ref 0–0.2)
BASOPHILS NFR BLD AUTO: 0.7 % (ref 0–1.5)
BILIRUB SERPL-MCNC: 0.2 MG/DL (ref 0.2–1.2)
BUN BLD-MCNC: 4 MG/DL (ref 6–20)
BUN/CREAT SERPL: 10.8 (ref 7–25)
CALCIUM SPEC-SCNC: 8.1 MG/DL (ref 8.6–10.5)
CHLORIDE SERPL-SCNC: 103 MMOL/L (ref 98–107)
CO2 SERPL-SCNC: 20.6 MMOL/L (ref 22–29)
CREAT BLD-MCNC: 0.37 MG/DL (ref 0.57–1)
DEPRECATED RDW RBC AUTO: 40 FL (ref 37–54)
EOSINOPHIL # BLD AUTO: 0.15 10*3/MM3 (ref 0–0.4)
EOSINOPHIL NFR BLD AUTO: 2 % (ref 0.3–6.2)
ERYTHROCYTE [DISTWIDTH] IN BLOOD BY AUTOMATED COUNT: 18.3 % (ref 12.3–15.4)
GFR SERPL CREATININE-BSD FRML MDRD: >150 ML/MIN/1.73
GLOBULIN UR ELPH-MCNC: 2.5 GM/DL
GLUCOSE BLD-MCNC: 89 MG/DL (ref 65–99)
HCT VFR BLD AUTO: 23.9 % (ref 34–46.6)
HGB A MFR BLD: 98.2 % (ref 96.4–98.8)
HGB A2 MFR BLD COLUMN CHROM: 1.8 % (ref 1.8–3.2)
HGB BLD-MCNC: 7.2 G/DL (ref 12–15.9)
HGB C MFR BLD: 0 %
HGB F MFR BLD: 0 % (ref 0–2)
HGB FRACT BLD-IMP: NORMAL
HGB S BLD QL SOLY: NEGATIVE
HGB S MFR BLD: 0 %
LDH SERPL-CCNC: 135 U/L (ref 135–214)
LYMPHOCYTES # BLD AUTO: 2.34 10*3/MM3 (ref 0.7–3.1)
LYMPHOCYTES NFR BLD AUTO: 31 % (ref 19.6–45.3)
MCH RBC QN AUTO: 18.8 PG (ref 26.6–33)
MCHC RBC AUTO-ENTMCNC: 30.1 G/DL (ref 31.5–35.7)
MCV RBC AUTO: 62.6 FL (ref 79–97)
MONOCYTES # BLD AUTO: 0.59 10*3/MM3 (ref 0.1–0.9)
MONOCYTES NFR BLD AUTO: 7.8 % (ref 5–12)
NEUTROPHILS # BLD AUTO: 4.34 10*3/MM3 (ref 1.7–7)
NEUTROPHILS NFR BLD AUTO: 57.4 % (ref 42.7–76)
PLATELET # BLD AUTO: 220 10*3/MM3 (ref 140–450)
POTASSIUM BLD-SCNC: 3.5 MMOL/L (ref 3.5–5.2)
PROT SERPL-MCNC: 5.4 G/DL (ref 6–8.5)
RBC # BLD AUTO: 3.82 10*6/MM3 (ref 3.77–5.28)
RETICS # AUTO: 0.05 10*6/MM3 (ref 0.02–0.13)
RETICS/RBC NFR AUTO: 1.4 % (ref 0.7–1.9)
SODIUM BLD-SCNC: 135 MMOL/L (ref 136–145)
WBC NRBC COR # BLD: 7.55 10*3/MM3 (ref 3.4–10.8)

## 2020-02-21 PROCEDURE — 80053 COMPREHEN METABOLIC PANEL: CPT | Performed by: INTERNAL MEDICINE

## 2020-02-21 PROCEDURE — 99238 HOSP IP/OBS DSCHRG MGMT 30/<: CPT | Performed by: OBSTETRICS & GYNECOLOGY

## 2020-02-21 PROCEDURE — 85025 COMPLETE CBC W/AUTO DIFF WBC: CPT | Performed by: INTERNAL MEDICINE

## 2020-02-21 PROCEDURE — 59025 FETAL NON-STRESS TEST: CPT

## 2020-02-21 PROCEDURE — 99232 SBSQ HOSP IP/OBS MODERATE 35: CPT | Performed by: INTERNAL MEDICINE

## 2020-02-21 PROCEDURE — 85045 AUTOMATED RETICULOCYTE COUNT: CPT | Performed by: INTERNAL MEDICINE

## 2020-02-21 PROCEDURE — 59025 FETAL NON-STRESS TEST: CPT | Performed by: OBSTETRICS & GYNECOLOGY

## 2020-02-21 PROCEDURE — 25010000002 IRON SUCROSE PER 1 MG: Performed by: OBSTETRICS & GYNECOLOGY

## 2020-02-21 PROCEDURE — 83615 LACTATE (LD) (LDH) ENZYME: CPT | Performed by: INTERNAL MEDICINE

## 2020-02-21 PROCEDURE — 25010000002 CYANOCOBALAMIN PER 1000 MCG: Performed by: INTERNAL MEDICINE

## 2020-02-21 RX ADMIN — Medication 1 TABLET: at 10:58

## 2020-02-21 RX ADMIN — CYANOCOBALAMIN 1000 MCG: 1000 INJECTION, SOLUTION INTRAMUSCULAR at 11:00

## 2020-02-21 RX ADMIN — IRON SUCROSE 300 MG: 20 INJECTION, SOLUTION INTRAVENOUS at 10:57

## 2020-02-21 NOTE — TELEPHONE ENCOUNTER
----- Message from Roxie Max MD sent at 2/21/2020  2:12 PM EST -----  Please schedule the patient in 1 weeks with nurse practitioner to start Venofer once weekly x4  Weeks-6 weeks.  Nurse practitioner to follow in 1 week and 4 weeks with CBC ferritin  Follow-up with me in 2 months.  Patient is pregnant and is due in April 2 week.  Please notify the nurse practitioner    Also Eloisa can you get approval for the Venofer.  Patient is pregnant and is iron deficient and cannot take oral iron as she gets constipated and nauseated with it    Roxie Max MD

## 2020-02-21 NOTE — NON STRESS TEST
Chiquita Galvez, a  at 33w6d with an MARISA of 2020, Date entered prior to episode creation, was seen at [facility] LABOR DELIVERY for a nonstress test.    Chief Complaint   Patient presents with   • Elevated Blood Pressure     sent from office for protienuria, pt states she has low iron and needs to repeat a GTT. +FM, denies LOF or VB, c/o frontal headache       Patient Active Problem List   Diagnosis   • Maternal care due to low transverse uterine scar from previous  delivery   • History of inadequate prenatal care   • Syphilis of mother during pregnancy   • History of  labor   • Tobacco use during pregnancy, antepartum   • Pregnancy   • Proteinuria affecting pregnancy in third trimester   • Hypokalemia   • History of postpartum hemorrhage, currently pregnant   • Patient desires vaginal birth after  section ()     Start Time:   Stop Time: 951    Interpretation A  Nonstress Test Interpretation A: Reactive (20 1036 : Vandana Das RN)

## 2020-02-21 NOTE — NURSING NOTE
This RN reviewed discharge instructions with patient and FOB. Discussed s/sx of labor, contractions, vaginal bleeding, decreased fetal movement and leakage of fluid and when to return to the hospital. Pt reports understanding. Pt reminded of appointment tomorrow. Pt reports feeling well and ambulated off unit with s.o.

## 2020-02-21 NOTE — PLAN OF CARE
Problem: Patient Care Overview  Goal: Plan of Care Review  Outcome: Ongoing (interventions implemented as appropriate)  Flowsheets (Taken 2/21/2020 0628)  Progress: no change  Plan of Care Reviewed With: patient; significant other  Outcome Summary: blood pressures within normal range, 24 hour urine normal,     Problem: Prenatal Patient, Hospitalized (Adult,Obstetrics,Pediatric)  Goal: Signs and Symptoms of Listed Potential Problems Will be Absent, Minimized or Managed (Prenatal Patient, Hospitalized)  Outcome: Ongoing (interventions implemented as appropriate)  Flowsheets (Taken 2/21/2020 0628)  Problems Assessed (Prenatal Patient): all  Problems Present (Prenatal Patient): none

## 2020-02-21 NOTE — NON STRESS TEST
Chiquita Galvez, a  at 33w6d with an MARISA of 2020, Date entered prior to episode creation, was seen at [facility] LABOR DELIVERY for a nonstress test.    Chief Complaint   Patient presents with   • Elevated Blood Pressure     sent from office for protienuria, pt states she has low iron and needs to repeat a GTT. +FM, denies LOF or VB, c/o frontal headache       Patient Active Problem List   Diagnosis   • Maternal care due to low transverse uterine scar from previous  delivery   • History of inadequate prenatal care   • Syphilis of mother during pregnancy   • History of  labor   • Tobacco use during pregnancy, antepartum   • Pregnancy   • Proteinuria affecting pregnancy in third trimester   • Hypokalemia   • History of postpartum hemorrhage, currently pregnant   • Patient desires vaginal birth after  section ()       Start Time: 1020  Stop Time: 103    Interpretation A  Nonstress Test Interpretation A: Reactive (20 1036 : Vandana Das RN)

## 2020-02-21 NOTE — PROGRESS NOTES
Name:  Chiquita Galvez        MR#:  [id number]      Progress Note    Brief note:  24 y.o.  at 33w6d admitted with proteinuria and elevated blood pressures. Patient denies complaints this morning including headache, RUQ pain or visual changes. Blood pressures are low. Patient denies ctx, loss of fluid or vaginal bleeding. Reports + FM.       Patient Active Problem List   Diagnosis   • Maternal care due to low transverse uterine scar from previous  delivery   • History of inadequate prenatal care   • Syphilis of mother during pregnancy   • History of  labor   • Tobacco use during pregnancy, antepartum   • Pregnancy   • Proteinuria affecting pregnancy in third trimester   • Hypokalemia   • History of postpartum hemorrhage, currently pregnant   • Patient desires vaginal birth after  section ()       OB History    Para Term  AB Living   7 5 4 1 1 5   SAB TAB Ectopic Molar Multiple Live Births   1 0 0 0 0 5      # Outcome Date GA Lbr Andrew/2nd Weight Sex Delivery Anes PTL Lv   7 Current            6  17 34w1d  1984 g (4 lb 6 oz) F CS-LTranv   RUTHY      Birth Comments: BPP 4/10       Complications: Abnormal  test   5 Term 11/08/15   3714 g (8 lb 3 oz) M Vag-Spont   RUTHY      Birth Comments: del note reviewed - [facility]       Complications: Postpartum hemorrhage   4 Term 14   3544 g (7 lb 13 oz) F Vag-Spont   RUTHY      Birth Comments: del note reviewed - no comps - JF    3 Term 13   3685 g (8 lb 2 oz) M Vag-Spont   RUTHY      Birth Comments: del note reviewed - no comps    2 Term 12   3742 g (8 lb 4 oz) M Vag-Vacuum   RUTHY      Birth Comments: del note reviewed - no comps - [facility]    1 SAB  16w0d             Birth Comments: required D&C for retained placenta     #: 1, Date: , Sex: None, Weight: None, GA: 16w0d, Delivery: None, Apgar1: None, Apgar5: None, Living: None, Birth Comments: required D&C for retained placenta    #: 2, Date:  12, Sex: Male, Weight: 3742 g (8 lb 4 oz), GA: None, Delivery: Vaginal, Vacuum (Extractor), Apgar1: None, Apgar5: None, Living: Living, Birth Comments: del note reviewed - no comps - [facility]     #: 3, Date: 13, Sex: Male, Weight: 3685 g (8 lb 2 oz), GA: None, Delivery: Vaginal, Spontaneous, Apgar1: None, Apgar5: None, Living: Living, Birth Comments: del note reviewed - no comps     #: 4, Date: 14, Sex: Female, Weight: 3544 g (7 lb 13 oz), GA: None, Delivery: Vaginal, Spontaneous, Apgar1: None, Apgar5: None, Living: Living, Birth Comments: del note reviewed - no comps -      #: 5, Date: 11/08/15, Sex: Male, Weight: 3714 g (8 lb 3 oz), GA: None, Delivery: Vaginal, Spontaneous, Apgar1: None, Apgar5: None, Living: Living, Birth Comments: del note reviewed - [facility]     #: 6, Date: 17, Sex: Female, Weight: 1984 g (4 lb 6 oz), GA: 34w1d, Delivery: , Low Transverse, Apgar1: None, Apgar5: None, Living: Living, Birth Comments: BP 4/10     #: 7, Date: None, Sex: None, Weight: None, GA: None, Delivery: None, Apgar1: None, Apgar5: None, Living: None, Birth Comments: None      Review of Systems          Vitals:  Temperature: Temp:  [97.8 °F (36.6 °C)-98.1 °F (36.7 °C)] 97.8 °F (36.6 °C)  Temp Source: Temp src: Oral  BP:  BP: ()/(40-73) 96/62  Pulse:  Heart Rate:  [] 91  Respirations: Resp:  [16-18] 16     Physical Examination: General appearance - alert, well appearing, and in no distress  Abdomen - gravid, soft, nontender, nondistended, no masses or organomegaly  Musculoskeletal - no joint tenderness, deformity or swelling  Extremities - peripheral pulses normal, no pedal edema, no clubbing or cyanosis  Skin - normal coloration and turgor, no rashes, no suspicious skin lesions noted    LABS:   Lab Results   Component Value Date    WBC 7.55 2020    HGB 7.2 (L) 2020    HCT 23.9 (L) 2020    MCV 62.6 (L) 2020     2020     Results from last 7 days   Lab  Units 20  0426   SODIUM mmol/L 135*   POTASSIUM mmol/L 3.5   CHLORIDE mmol/L 103   CO2 mmol/L 20.6*   BUN mg/dL 4*   CREATININE mg/dL 0.37*   CALCIUM mg/dL 8.1*   BILIRUBIN mg/dL 0.2   ALK PHOS U/L 101   ALT (SGPT) U/L <5   AST (SGOT) U/L 12   GLUCOSE mg/dL 89       Non-stress test:  Reactive         1. IUP @ 33w6d  2. Severe Iron-def anemia - will give second dose IV Venofer today and return tomorrow for third dose  3. B12 def - s/p one B12 injection -  will give additional injections today and tomorrow - cont weekly x 4 weeks   4. Proteinuria- 24h TP - 180 mg - BP wnl - Strict PreE warnings   5. Prior  - desires TOLAC   6. H/O PPH - will likely need IV Iron weekly until delivery    D/C home - return tomorrow for NST, IV Iron and B12  Strict PTL and PreE warnings        Destinee Zimmerman MD  2020 10:43 AM

## 2020-02-21 NOTE — DISCHARGE SUMMARY
Discharge Summary    Date of Admission: 2020  Date of Discharge:  2020      Patient: Chiquita Galvez      MR#:[id number]    Primary OB: Dora Mckeon MD     Discharge Diagnosis: 1. IUP at 33w6d  2. Proteinuria in pregnancy   3. History of Syphilis, treated  4. Maternal anemia  5. Tobacco use   6. Insufficient prenatal care   7. Prior  section         Presenting Problem/History of Present Illness  Pregnancy [Z34.90]  Pregnancy [Z34.90]     Patient Active Problem List   Diagnosis   • Maternal care due to low transverse uterine scar from previous  delivery   • History of inadequate prenatal care   • Syphilis of mother during pregnancy   • History of  labor   • Tobacco use during pregnancy, antepartum   • Pregnancy   • Proteinuria affecting pregnancy in third trimester   • Hypokalemia   • History of postpartum hemorrhage, currently pregnant   • Patient desires vaginal birth after  section ()       Hospital Course  Patient is a 24 y.o. female  at 33w6d presented with proteinuria and was admitted for 24-hour urine that returned 180 mg Protein. Patient had normal blood pressures and denies headache, RUQ pain or visual changes. Hem/Onc was consulted and recommended IV Venofer x 3 and B12 daily for 5 days and then weekly for 4 weeks. ID was consulted and recommended repeat RPR 6 months after delivery. Fetal US was peformed and revealed normal growth with EFW of 31%, and AC 44% with BPP 8/8.         I    Condition on Discharge:  Stable    Vital Signs  Temp:  [97.8 °F (36.6 °C)-98.1 °F (36.7 °C)] 97.8 °F (36.6 °C)  Heart Rate:  [] 91  Resp:  [16-18] 16  BP: ()/(40-73) 96/62    Lab Results   Component Value Date    WBC 7.55 2020    HGB 7.2 (L) 2020    HCT 23.9 (L) 2020    MCV 62.6 (L) 2020     2020       Discharge Disposition  Home or Self Care    Discharge Medications     Discharge Medications      Continue These  Medications      Instructions Start Date   calcium carbonate 500 MG chewable tablet  Commonly known as:  TUMS   1 tablet, Oral, Daily      magnesium oxide 400 MG tablet  Commonly known as:  MAG-OX   400 mg, Oral, Daily      PRENATAL 1 30-0.975-200 MG capsule   1 tablet, Oral, Daily             Discharge Diet: Regular    Follow-up Appointments  Future Appointments   Date Time Provider Department Center   2/22/2020 11:00 AM DANIEL LD PROCEDURE ROOM [facility] DANIEL LDP DANIEL   2/26/2020  8:00 AM DANIEL NATTY US 1 BH DANIEL US RI DANIEL   2/26/2020  8:00 AM  DANIEL MFM NATTY SCHEDULE [facility] MFM DANIEL DANIEL   2/26/2020 10:45 AM Dora Mckeon MD [facility] DUP None     Additional Instructions for the Follow-ups that You Need to Schedule     Call MD for problems / concerns.   As directed      Instructions: Call for temp>101, vaginal bleeding greater than one pad/hour, severe pain or other concerns.    Order Comments:  Instructions: Call for temp>101, vaginal bleeding greater than one pad/hour, severe pain or other concerns.                     Destinee Zimmerman MD  2/21/2020  10:58 AM

## 2020-02-21 NOTE — PROGRESS NOTES
Subjective .     REASONS FOR FOLLOWUP:     1.   7, para 4, 33 weeks and 4 days pregnant     2.  History of syphilis, infectious disease been consulted.  Patient [facility] by the [facility]     3.  Anemia likely secondary to iron deficiency and B12 deficiency.       HISTORY OF PRESENT ILLNESS:  The patient is a 24 y.o. year old female who is here for follow-up with the above-mentioned history.    History of Present Illness   patient is a 24-year-old female  7 para 4, currently 33 weeks and 4 days pregnant who is here today.  She had complaints of headache and she had protein in the urine.  Patient did not get prenatal care early on.  She had one visit at 29 weeks with Devi then was transferred to the [facility] last week.  She has diagnosis with syphilis at 29 weeks and she has had 3 penicillin [facility] the [facility].  Her initial RPR titer was 1 is 264 and recheck last week was 1 is 216.  Her previous obstetric/gynecological history is noted for 4 vaginal deliveries and 1 .  Her last pregnancy was at 34 weeks.  She presented with  labor but had urgent C-sections due to nonreassuring fetal status.  She declined tubal sterilization this time.  She has quit smoking this pregnancy.  Her hemoglobin was 8.3 last week and today is 7.1.  She has been taking 1 iron pill a day.  Her glucose was elevated at 136.  Her liver function tests are normal.     We were consulted because she was anemic.  She is found to be iron deficient as well as B12 deficient.  Her B12 level is 231.  She will require IV Venofer as she cannot tolerate oral iron and start on B12 injections.     Patient is receiving 25 urine for protein.  They have requested an infectious disease consult.  We have been consulted in order to see if she is eligible for IV iron infusions.     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  ID agreed with giving her weekly IM penicillin injections x[facility] the [facility].  Treatment is still with I M  Penicillin.  He is planning to repeat RPR in 6 months.,  Patient has small for gestational age yesterday.  She has both low B12 and iron and received Venofer yesterday.  Patient to be discharged today      Past Medical History:   Diagnosis Date   • Migraine    • Syphilis 2019    FTA positive during pregnancy, [facility]       ONCOLOGIC HISTORY:  (History from previous dates can be found in the separate document.)    No current facility-administered medications on file prior to encounter.      Current Outpatient Medications on File Prior to Encounter   Medication Sig Dispense Refill   • calcium carbonate (TUMS) 500 MG chewable tablet Chew 1 tablet Daily.     • magnesium oxide (MAG-OX) 400 MG tablet Take 1 tablet by mouth Daily. 30 tablet 2   • Prenatal MV-Min-Fe Fum-FA-DHA (PRENATAL 1) 30-0.975-200 MG capsule Take 1 tablet by mouth Daily. 30 capsule 6       ALLERGIES:   No Known Allergies    Social History     Socioeconomic History   • Marital status: Single     Spouse name: Not on file   • Number of children: Not on file   • Years of education: Not on file   • Highest education level: Not on file   Tobacco Use   • Smoking status: Former Smoker     Types: Cigars     Last attempt to quit: 2019     Years since quittin.2   • Smokeless tobacco: Never Used   Substance and Sexual Activity   • Alcohol use: Never     Frequency: Never   • Drug use: Never   • Sexual activity: Yes     Partners: Male     Birth control/protection: None         Cancer-related family history is not on file.     Review of Systems  A comprehensive 14 point review of systems was performed and was  negative except as mentioned.    Objective      Vitals:    20 0107 20 0327 20 0704 20 0708   BP: 91/52 95/57 96/62    BP Location:  Right arm     Patient Position:  Lying     Pulse: 86 78 91    Resp:  16 16    Temp:  97.8 °F (36.6 °C)  97.8 °F (36.6 °C)   TempSrc:  Oral  Oral   SpO2:       Weight:       Height:         No flowsheet data found.    Physical Exam      GENERAL:  Well-developed, well-nourished in no acute distress.   LYMPHATICS:  No cervical, supraclavicular, axillary or inguinal adenopathy.  CHEST:  Lungs clear to auscultation. Good airflow.  CARDIAC:  Regular rate and rhythm without murmurs, rubs or gallops. Normal S1,S2.  ABDOMEN:  Soft, nontender with no hepatosplenomegaly or masses.  EXTREMITIES:  No clubbing, cyanosis or edema.  NEUROLOGICAL:  Cranial Nerves II-XII grossly intact.  No focal neurological deficits.  PSYCHIATRIC:  Normal affect and mood.      RECENT LABS:  Hematology WBC   Date Value Ref Range Status   2020 7.55 3.40 - 10.80 10*3/mm3 Final     RBC   Date Value Ref Range Status   2020 3.82 3.77 - 5.28 10*6/mm3 Final     Hemoglobin   Date Value Ref Range Status   2020 7.2 (L) 12.0 - 15.9 g/dL Final     Hematocrit   Date Value Ref Range Status   2020 23.9 (L) 34.0 - 46.6 % Final     Platelets   Date Value Ref Range Status   2020 220 140 - 450 10*3/mm3 Final        Assessment/Plan     1.  Multifactorial anemia with iron deficiency and B12 deficiency. .  Patient has been taking oral iron 1 a day but it causes her constipation and nausea and she is not able to tolerate oral iron.  Her B12 level was 231.  I have reviewed her iron profile and she is iron deficient and she will require IV Venofer.  I discussed IV Venofer with the patient as well as starting B12 injections.  · S/p IV Venofer  · On B12 injections daily for 5 days then once a week and then once a month     2.   7 para 4 now 33 weeks and 4 days pregnant.  Patient has not  been compliant with her prenatal care.     3.  History of syphilis for which she received 3 penicillin [facility] and infectious diseases been consulted.     4.  Proteinuria, 24 urine protein is being pending.  Her blood pressure is stable.  She does not show signs of eclampsia according to the nurse.     Plan  1. Start IV Venofer daily times x3 doses  2. Start B12 injections 1000 mcg IM daily for 5 days and then once a week for 4 weeks and then once a month  3. No evidence of active bleeding.  4. Infectious disease consult obtained in order to evaluate her underlying syphilis status  5. 24 urine for protein pending    If patient is discharged we will continue her Venofer in the office once a week along with B12 injections.    Okay to discharge from a point.  Nurse states that she is going to come to the hospital to get IV Venofer and B12 injections tomorrow but then can be followed as outpatient afterwards.    Clinically patient with nurse practitioner in 2 weeks with IV Venofer and follow-up with me in about 2 months with CBC ferritin    Roxie Max MD                  Cc:  Bisi Bermudez MD

## 2020-02-21 NOTE — NURSING NOTE
Patient to be taken off monitor and given second IV iron infusion today. Once patient receives venofer patient may be d/c home.   Patient is to return to hospital tomorrow for third IV iron infusion around 2084-1164. 300 IV venofer ordered with no pre meds. Patient is to have NST and may come off monitor once reactive.

## 2020-02-22 ENCOUNTER — HOSPITAL ENCOUNTER (OUTPATIENT)
Facility: HOSPITAL | Age: 25
Discharge: HOME | End: 2020-02-22
Attending: OBSTETRICS & GYNECOLOGY | Admitting: OBSTETRICS & GYNECOLOGY

## 2020-02-22 VITALS
BODY MASS INDEX: 25.12 KG/M2 | OXYGEN SATURATION: 100 % | RESPIRATION RATE: 18 BRPM | WEIGHT: 141.8 LBS | DIASTOLIC BLOOD PRESSURE: 63 MMHG | HEART RATE: 111 BPM | HEIGHT: 63 IN | TEMPERATURE: 98.3 F | SYSTOLIC BLOOD PRESSURE: 98 MMHG

## 2020-02-22 PROCEDURE — 25010000002 CYANOCOBALAMIN PER 1000 MCG: Performed by: OBSTETRICS & GYNECOLOGY

## 2020-02-22 PROCEDURE — 96372 THER/PROPH/DIAG INJ SC/IM: CPT

## 2020-02-22 PROCEDURE — 59025 FETAL NON-STRESS TEST: CPT

## 2020-02-22 PROCEDURE — 96366 THER/PROPH/DIAG IV INF ADDON: CPT

## 2020-02-22 PROCEDURE — G0378 HOSPITAL OBSERVATION PER HR: HCPCS

## 2020-02-22 PROCEDURE — 96365 THER/PROPH/DIAG IV INF INIT: CPT

## 2020-02-22 PROCEDURE — 25010000002 IRON SUCROSE PER 1 MG: Performed by: OBSTETRICS & GYNECOLOGY

## 2020-02-22 RX ORDER — CYANOCOBALAMIN 1000 UG/ML
1000 INJECTION, SOLUTION INTRAMUSCULAR; SUBCUTANEOUS
Status: COMPLETED | OUTPATIENT
Start: 2020-02-22 | End: 2020-02-22

## 2020-02-22 RX ORDER — SODIUM CHLORIDE 0.9 % (FLUSH) 0.9 %
3 SYRINGE (ML) INJECTION EVERY 12 HOURS SCHEDULED
Status: DISCONTINUED | OUTPATIENT
Start: 2020-02-22 | End: 2020-02-22 | Stop reason: HOSPADM

## 2020-02-22 RX ORDER — SODIUM CHLORIDE 0.9 % (FLUSH) 0.9 %
10 SYRINGE (ML) INJECTION AS NEEDED
Status: DISCONTINUED | OUTPATIENT
Start: 2020-02-22 | End: 2020-02-22 | Stop reason: HOSPADM

## 2020-02-22 RX ADMIN — SODIUM CHLORIDE, PRESERVATIVE FREE 3 ML: 5 INJECTION INTRAVENOUS at 12:44

## 2020-02-22 RX ADMIN — CYANOCOBALAMIN 1000 MCG: 1000 INJECTION, SOLUTION INTRAMUSCULAR at 11:44

## 2020-02-22 RX ADMIN — IRON SUCROSE 300 MG: 20 INJECTION, SOLUTION INTRAVENOUS at 10:54

## 2020-02-22 NOTE — NON STRESS TEST
Chiquita Lenny, a  at 34w0d with an MARISA of 2020, Date entered prior to episode creation, was seen at [facility] LABOR DELIVERY for a nonstress test.    Chief Complaint   Patient presents with   • Medication Administration     Pt here for iron infusuion and B12 injection, denies leaking/bleeding, pos FM       Patient Active Problem List   Diagnosis   • Maternal care due to low transverse uterine scar from previous  delivery   • History of inadequate prenatal care   • Syphilis of mother during pregnancy   • History of  labor   • Tobacco use during pregnancy, antepartum   • Pregnancy   • Proteinuria affecting pregnancy in third trimester   • Hypokalemia   • History of postpartum hemorrhage, currently pregnant   • Patient desires vaginal birth after  section ()       Start Time: 102  Stop Time:     Interpretation A  Nonstress Test Interpretation A: Reactive (20 1319 : Suha Galvez, RN)

## 2020-02-24 ENCOUNTER — RESULTS ENCOUNTER (OUTPATIENT)
Dept: OBSTETRICS AND GYNECOLOGY | Age: 25
End: 2020-02-24

## 2020-02-24 DIAGNOSIS — Z3A.33 33 WEEKS GESTATION OF PREGNANCY: ICD-10-CM

## 2020-02-24 DIAGNOSIS — O12.13 PROTEINURIA AFFECTING PREGNANCY IN THIRD TRIMESTER: ICD-10-CM

## 2020-02-24 PROBLEM — E61.1 IRON DEFICIENCY: Status: ACTIVE | Noted: 2020-02-24

## 2020-02-24 PROBLEM — T45.4X5A IRON ADVERSE REACTION: Status: ACTIVE | Noted: 2020-02-24

## 2020-02-24 LAB
ACETONE SERPL-MCNC: NEGATIVE % (ref 0–0.01)
BUTALBITAL SERPL-MCNC: <1 UG/ML (ref 1–10)
CHLORDIAZEP SERPL-MCNC: <0.1 UG/ML (ref 0.1–0.9)
DIAZEPAM SERPL-MCNC: <0.1 UG/ML (ref 0.1–0.9)
ETHANOL SPEC-SCNC: NEGATIVE % (ref 0–0.01)
ISOPROPANOL SERPL-MCNC: NEGATIVE % (ref 0–0.01)
Lab: ABNORMAL
METHANOL SERPL-MCNC: NEGATIVE % (ref 0–0.01)
NORCHLORDIAZEP SERPL-MCNC: <0.1 UG/ML (ref 0.1–0.6)
NORDIAZEPAM SERPL-MCNC: <0.1 UG/ML (ref 0.1–1.4)
PENTOBARB SERPL-MCNC: <1 UG/ML (ref 1–5)
PHENOBARB SERPL-MCNC: <1 UG/ML (ref 15–40)
SALICYLATES SERPL-MCNC: ABNORMAL UG/ML (ref 30–250)

## 2020-02-24 RX ORDER — ACETAMINOPHEN 325 MG/1
650 TABLET ORAL ONCE
OUTPATIENT
Start: 2020-04-17

## 2020-02-24 RX ORDER — DIPHENHYDRAMINE HCL 25 MG
25 CAPSULE ORAL ONCE
Status: CANCELLED | OUTPATIENT
Start: 2020-03-06

## 2020-02-24 RX ORDER — DIPHENHYDRAMINE HCL 25 MG
25 CAPSULE ORAL ONCE
OUTPATIENT
Start: 2020-04-10

## 2020-02-24 RX ORDER — ACETAMINOPHEN 325 MG/1
650 TABLET ORAL ONCE
OUTPATIENT
Start: 2020-04-03

## 2020-02-24 RX ORDER — SODIUM CHLORIDE 9 MG/ML
250 INJECTION, SOLUTION INTRAVENOUS ONCE
OUTPATIENT
Start: 2020-04-17

## 2020-02-24 RX ORDER — ACETAMINOPHEN 325 MG/1
650 TABLET ORAL ONCE
OUTPATIENT
Start: 2020-03-27

## 2020-02-24 RX ORDER — ACETAMINOPHEN 325 MG/1
650 TABLET ORAL ONCE
Status: CANCELLED | OUTPATIENT
Start: 2020-03-06

## 2020-02-24 RX ORDER — SODIUM CHLORIDE 9 MG/ML
250 INJECTION, SOLUTION INTRAVENOUS ONCE
OUTPATIENT
Start: 2020-04-03

## 2020-02-24 RX ORDER — ACETAMINOPHEN 325 MG/1
650 TABLET ORAL ONCE
OUTPATIENT
Start: 2020-04-10

## 2020-02-24 RX ORDER — SODIUM CHLORIDE 9 MG/ML
250 INJECTION, SOLUTION INTRAVENOUS ONCE
OUTPATIENT
Start: 2020-04-10

## 2020-02-24 RX ORDER — SODIUM CHLORIDE 9 MG/ML
250 INJECTION, SOLUTION INTRAVENOUS ONCE
Status: CANCELLED | OUTPATIENT
Start: 2020-03-06

## 2020-02-24 RX ORDER — SODIUM CHLORIDE 9 MG/ML
250 INJECTION, SOLUTION INTRAVENOUS ONCE
OUTPATIENT
Start: 2020-03-20

## 2020-02-24 RX ORDER — DIPHENHYDRAMINE HCL 25 MG
25 CAPSULE ORAL ONCE
OUTPATIENT
Start: 2020-04-17

## 2020-02-24 RX ORDER — ACETAMINOPHEN 325 MG/1
650 TABLET ORAL ONCE
OUTPATIENT
Start: 2020-03-20

## 2020-02-24 RX ORDER — DIPHENHYDRAMINE HCL 25 MG
25 CAPSULE ORAL ONCE
OUTPATIENT
Start: 2020-04-03

## 2020-02-24 RX ORDER — DIPHENHYDRAMINE HCL 25 MG
25 CAPSULE ORAL ONCE
OUTPATIENT
Start: 2020-03-27

## 2020-02-24 RX ORDER — SODIUM CHLORIDE 9 MG/ML
250 INJECTION, SOLUTION INTRAVENOUS ONCE
OUTPATIENT
Start: 2020-03-27

## 2020-02-24 RX ORDER — DIPHENHYDRAMINE HCL 25 MG
25 CAPSULE ORAL ONCE
OUTPATIENT
Start: 2020-03-20

## 2020-02-25 ENCOUNTER — TELEPHONE (OUTPATIENT)
Dept: OBSTETRICS AND GYNECOLOGY | Age: 25
End: 2020-02-25

## 2020-02-26 ENCOUNTER — ROUTINE PRENATAL (OUTPATIENT)
Dept: OBSTETRICS AND GYNECOLOGY | Age: 25
End: 2020-02-26

## 2020-02-26 ENCOUNTER — PROCEDURE VISIT (OUTPATIENT)
Dept: OBSTETRICS AND GYNECOLOGY | Age: 25
End: 2020-02-26

## 2020-02-26 VITALS — BODY MASS INDEX: 25.26 KG/M2 | DIASTOLIC BLOOD PRESSURE: 64 MMHG | SYSTOLIC BLOOD PRESSURE: 104 MMHG | WEIGHT: 142.6 LBS

## 2020-02-26 DIAGNOSIS — O12.13 PROTEINURIA AFFECTING PREGNANCY IN THIRD TRIMESTER: ICD-10-CM

## 2020-02-26 DIAGNOSIS — O99.330 TOBACCO USE DURING PREGNANCY, ANTEPARTUM: ICD-10-CM

## 2020-02-26 DIAGNOSIS — O09.30 HISTORY OF INADEQUATE PRENATAL CARE: ICD-10-CM

## 2020-02-26 DIAGNOSIS — E61.1 IRON DEFICIENCY: ICD-10-CM

## 2020-02-26 DIAGNOSIS — O34.211 MATERNAL CARE DUE TO LOW TRANSVERSE UTERINE SCAR FROM PREVIOUS CESAREAN DELIVERY: ICD-10-CM

## 2020-02-26 DIAGNOSIS — O09.299 HISTORY OF POSTPARTUM HEMORRHAGE, CURRENTLY PREGNANT: ICD-10-CM

## 2020-02-26 DIAGNOSIS — O34.219 PATIENT DESIRES VAGINAL BIRTH AFTER CESAREAN SECTION (VBAC): ICD-10-CM

## 2020-02-26 DIAGNOSIS — Z3A.34 34 WEEKS GESTATION OF PREGNANCY: Primary | ICD-10-CM

## 2020-02-26 DIAGNOSIS — Z87.51 HISTORY OF PRETERM LABOR: Primary | ICD-10-CM

## 2020-02-26 DIAGNOSIS — Z13.89 SCREENING FOR HEMATURIA OR PROTEINURIA: ICD-10-CM

## 2020-02-26 DIAGNOSIS — Z12.4 SCREENING FOR CERVICAL CANCER: ICD-10-CM

## 2020-02-26 DIAGNOSIS — Z87.51 HISTORY OF PRETERM LABOR: ICD-10-CM

## 2020-02-26 DIAGNOSIS — O98.119 SYPHILIS OF MOTHER DURING PREGNANCY: ICD-10-CM

## 2020-02-26 LAB
BILIRUB BLD-MCNC: ABNORMAL MG/DL
CLARITY, POC: CLEAR
COLOR UR: ABNORMAL
GLUCOSE UR STRIP-MCNC: NEGATIVE MG/DL
KETONES UR QL: NEGATIVE
LEUKOCYTE EST, POC: ABNORMAL
NITRITE UR-MCNC: NEGATIVE MG/ML
PH UR: 6.5 [PH] (ref 5–8)
PROT UR STRIP-MCNC: ABNORMAL MG/DL
RBC # UR STRIP: ABNORMAL /UL
SP GR UR: 1.02 (ref 1–1.03)
UROBILINOGEN UR QL: ABNORMAL

## 2020-02-26 PROCEDURE — 76819 FETAL BIOPHYS PROFIL W/O NST: CPT | Performed by: OBSTETRICS & GYNECOLOGY

## 2020-02-26 PROCEDURE — 99214 OFFICE O/P EST MOD 30 MIN: CPT | Performed by: OBSTETRICS & GYNECOLOGY

## 2020-02-27 NOTE — PROGRESS NOTES
Pregnancy at 34 4/7 wk gest  No c/o, good movement, irregular contractions, getting IV iron for severe anemia, followed by Heme. No bleeding.  ROS- all negative except for some cramping and contractions  US BPP 8/8 DANIEL=13  Exam-awake/alert in nad  Abd-soft gravid NT  Pelvic- normal female genitalia, no lesions, vagina and cervix without lesions, pap sent, cervix-anterior, long/closed/high, vtx  extr- NT  Neuro- Normal  A/P pregnancy at 34 4/7 wk gest  1. Severe anemia- recent HGB 7.2. Heme consulted and pt getting several rounds of IV iron and B12 injections. She reports significant anemia in the past and has had several blood transfusions during pregnancy.   2. Prior C/S x1 with 4 previous vaginal deliveries- planning   3. Syphilis in pregnancy- s/p PCN injection x3. Has seen ID and has had adequate treatment. Needs repeat RPR  4. Proteinuria at last visit- 24 hr urine 180 mg, was likely due to dehydration, encouraged increased water intake  5. Pap sent today  6. Weekly ANT due to prior delivery at 34 weeks due to non-reassuring fetal status, placental pathology noted superficial placental implantation  F/u 1 wk

## 2020-02-28 ENCOUNTER — APPOINTMENT (OUTPATIENT)
Dept: ONCOLOGY | Facility: HOSPITAL | Age: 25
End: 2020-02-28

## 2020-02-28 ENCOUNTER — APPOINTMENT (OUTPATIENT)
Dept: LAB | Facility: HOSPITAL | Age: 25
End: 2020-02-28

## 2020-03-02 LAB
CONV .: ABNORMAL
CYTOLOGIST CVX/VAG CYTO: ABNORMAL
CYTOLOGY CVX/VAG DOC CYTO: ABNORMAL
CYTOLOGY CVX/VAG DOC THIN PREP: ABNORMAL
DX ICD CODE: ABNORMAL
DX ICD CODE: ABNORMAL
HIV 1 & 2 AB SER-IMP: ABNORMAL
OTHER STN SPEC: ABNORMAL
PATHOLOGIST CVX/VAG CYTO: ABNORMAL
RECOM F/U CVX/VAG CYTO: ABNORMAL
STAT OF ADQ CVX/VAG CYTO-IMP: ABNORMAL

## 2020-03-03 ENCOUNTER — HOSPITAL ENCOUNTER (OUTPATIENT)
Facility: HOSPITAL | Age: 25
Setting detail: OBSERVATION
Discharge: HOME | End: 2020-03-03
Attending: OBSTETRICS & GYNECOLOGY | Admitting: OBSTETRICS & GYNECOLOGY

## 2020-03-03 VITALS
HEART RATE: 87 BPM | OXYGEN SATURATION: 98 % | RESPIRATION RATE: 16 BRPM | BODY MASS INDEX: 25.26 KG/M2 | TEMPERATURE: 98 F | HEIGHT: 63 IN | SYSTOLIC BLOOD PRESSURE: 113 MMHG | DIASTOLIC BLOOD PRESSURE: 75 MMHG

## 2020-03-03 PROCEDURE — 99214 OFFICE O/P EST MOD 30 MIN: CPT | Performed by: OBSTETRICS & GYNECOLOGY

## 2020-03-03 PROCEDURE — 59025 FETAL NON-STRESS TEST: CPT

## 2020-03-03 PROCEDURE — G0378 HOSPITAL OBSERVATION PER HR: HCPCS

## 2020-03-03 PROCEDURE — 59025 FETAL NON-STRESS TEST: CPT | Performed by: OBSTETRICS & GYNECOLOGY

## 2020-03-03 PROCEDURE — 96360 HYDRATION IV INFUSION INIT: CPT

## 2020-03-03 RX ORDER — SODIUM CHLORIDE, SODIUM LACTATE, POTASSIUM CHLORIDE, CALCIUM CHLORIDE 600; 310; 30; 20 MG/100ML; MG/100ML; MG/100ML; MG/100ML
999 INJECTION, SOLUTION INTRAVENOUS ONCE
Status: COMPLETED | OUTPATIENT
Start: 2020-03-03 | End: 2020-03-03

## 2020-03-03 RX ADMIN — SODIUM CHLORIDE, POTASSIUM CHLORIDE, SODIUM LACTATE AND CALCIUM CHLORIDE 999 ML/HR: 600; 310; 30; 20 INJECTION, SOLUTION INTRAVENOUS at 21:50

## 2020-03-04 ENCOUNTER — ROUTINE PRENATAL (OUTPATIENT)
Dept: OBSTETRICS AND GYNECOLOGY | Age: 25
End: 2020-03-04

## 2020-03-04 ENCOUNTER — TELEPHONE (OUTPATIENT)
Dept: ONCOLOGY | Facility: CLINIC | Age: 25
End: 2020-03-04

## 2020-03-04 VITALS — SYSTOLIC BLOOD PRESSURE: 102 MMHG | DIASTOLIC BLOOD PRESSURE: 58 MMHG | WEIGHT: 144.4 LBS | BODY MASS INDEX: 25.58 KG/M2

## 2020-03-04 DIAGNOSIS — O99.019 ANEMIA AFFECTING SEVENTH PREGNANCY: ICD-10-CM

## 2020-03-04 DIAGNOSIS — O09.30 HISTORY OF INADEQUATE PRENATAL CARE: ICD-10-CM

## 2020-03-04 DIAGNOSIS — O34.219 PATIENT DESIRES VAGINAL BIRTH AFTER CESAREAN SECTION (VBAC): ICD-10-CM

## 2020-03-04 DIAGNOSIS — Z3A.35 35 WEEKS GESTATION OF PREGNANCY: Primary | ICD-10-CM

## 2020-03-04 DIAGNOSIS — O98.119 SYPHILIS OF MOTHER DURING PREGNANCY: ICD-10-CM

## 2020-03-04 DIAGNOSIS — E61.1 IRON DEFICIENCY: ICD-10-CM

## 2020-03-04 DIAGNOSIS — O09.40 ANEMIA AFFECTING SEVENTH PREGNANCY: ICD-10-CM

## 2020-03-04 DIAGNOSIS — R87.612 LGSIL ON PAP SMEAR OF CERVIX: ICD-10-CM

## 2020-03-04 PROCEDURE — 99214 OFFICE O/P EST MOD 30 MIN: CPT | Performed by: OBSTETRICS & GYNECOLOGY

## 2020-03-04 NOTE — PROGRESS NOTES
Pregnancy at 35 4/7 wk gest  Seen on L&D for labor check yesterday, cervix no change, reactive NST  ROS- all negative except for some irregular contractions, no current contractions today  Exam-awake/alert in nad  Abd-soft gravid NT  extr- NT  Neuro- normal  A/P pregnancy at 35 4/7 wk gest  1. Labor warnings an kick counts reviewed  2. Severe anemia- seen by Heme for consult, has f/u scheduled for Iron infusions but missed appt yesterday, she will call to reschedule  3. Syphilis this pregnancy- [facility], recheck RPR 6 mo per ID recommendations  4. Plans - risks/benefits reviewed  5. NST reactive yesterday- plan weekly BPP (wasn't scheduled today)  6. Pap w/ LGSIL- discussed w/ pt, plan colpo postpartum

## 2020-03-04 NOTE — NON STRESS TEST
Chiquita Galvez, a  at 35w3d with an MARISA of 2020, Date entered prior to episode creation, was seen at [facility] LABOR DELIVERY for a nonstress test.    Chief Complaint   Patient presents with   • Contractions     Contractions starting yesterday and have continued. Pt rating pain 6/10. Pt noted discharge but not continuous leaking. Pt states she has had some decreased FM in the evenings.        Patient Active Problem List   Diagnosis   • Maternal care due to low transverse uterine scar from previous  delivery   • History of inadequate prenatal care   • Syphilis of mother during pregnancy   • History of  labor   • Tobacco use during pregnancy, antepartum   • Pregnancy   • Proteinuria affecting pregnancy in third trimester   • Hypokalemia   • History of postpartum hemorrhage, currently pregnant   • Patient desires vaginal birth after  section ()   • Iron deficiency   • Iron adverse reaction       Start Time:   Stop Time:     Interpretation A  Nonstress Test Interpretation A: Reactive (20 1193 : Basia Ford RN)

## 2020-03-04 NOTE — PROGRESS NOTES
TRIAGE NOTE    Chief Complaint   Patient presents with   • Contractions     Contractions starting yesterday and have continued. Pt rating pain 6/10. Pt noted discharge but not continuous leaking. Pt states she has had some decreased FM in the evenings.        Subjective     Patient is a 24 y.o. female  currently at 35w3d, who presents with of contractions this evening about every 6 to 7 minutes.  They have diminished since the patient has been here.  Patient was admitted about 2 weeks ago with proteinuria.  Her blood pressure has been normal.  Patient has been treated for syphilis during the pregnancy.  She is receiving iron infusions weekly for severe anemia.  She has an appointment to see Dr. Mckeon tomorrow.        The following portions of the patients history were reviewed and updated as appropriate: problem list .       Prenatal Information:   Maternal Prenatal Labs  Blood Type No results found for: ABO   Rh Status No results found for: RH   Antibody Screen No results found for: ABSCRN   Gonnorhea No results found for: GCCX   Chlamydia No results found for: CLAMYDCU   RPR No results found for: RPR   Syphilis Antibody No results found for: SYPHILIS   Rubella No results found for: RUBELLAIGGIN   Hepatitis B Surface Antigen No results found for: HEPBSAG   HIV-1 Antibody No results found for: LABHIV1   Hepatitis C Antibody No results found for: HEPCAB   Rapid Urin Drug Screen No results found for: AMPMETHU, BARBITSCNUR, LABBENZSCN, LABMETHSCN, LABOPIASCN, THCURSCR, COCAINEUR, AMPHETSCREEN, PROPOXSCN, BUPRENORSCNU, METAMPSCNUR, OXYCODONESCN, TRICYCLICSCN   Group B Strep Culture No results found for: GBSANTIGEN        Objective:    Vital Signs:   Vitals:    20   Resp: 16   Temp: 98 °F (36.7 °C)   SpO2: 98%     Physical Exam:     Gen: The patient is lying in bed and appears in no distress.    Abd: soft nontender gravid    Cervix: Cervix is mid position closed moderate to firm consistency      NST :  Reactive.  [place]: No contractions      Labs:    Lab Results (last 7 days)     ** No results found for the last 168 hours. **          Assessment and Plan:    24 y.o.   Patient Active Problem List    Diagnosis   • Iron deficiency [E61.1]   • Iron adverse reaction [T45.4X5A]   • History of postpartum hemorrhage, currently pregnant [O09.299]   • Patient desires vaginal birth after  section () [O34.219]   • Pregnancy [Z34.90]   • Proteinuria affecting pregnancy in third trimester [O12.13]   • Hypokalemia [E87.6]   • Maternal care due to low transverse uterine scar from previous  delivery [O34.211]   • History of inadequate prenatal care [O09.30]   • Syphilis of mother during pregnancy [O98.119]   • History of  labor [Z87.51]   • Tobacco use during pregnancy, antepartum [O99.330]       1) The patient does not appear to be in labor.  Cervix is closed and we are not picking  up contractions.  Patient is reassured.  She will keep her appointment tomorrow.  2) Reassuring fetal status    The Patient will follow up tomorrow in the office.  She was counseled to call or return for vaginal bleeding, regular contractions, leakage of fluid, or decreased fetal movement.     Brooks Irwin MD  March 3, 2020

## 2020-03-06 ENCOUNTER — APPOINTMENT (OUTPATIENT)
Dept: ONCOLOGY | Facility: HOSPITAL | Age: 25
End: 2020-03-06

## 2020-03-06 ENCOUNTER — OFFICE VISIT (OUTPATIENT)
Dept: ONCOLOGY | Facility: CLINIC | Age: 25
End: 2020-03-06

## 2020-03-06 ENCOUNTER — INFUSION (OUTPATIENT)
Dept: ONCOLOGY | Facility: HOSPITAL | Age: 25
End: 2020-03-06

## 2020-03-06 ENCOUNTER — APPOINTMENT (OUTPATIENT)
Dept: LAB | Facility: HOSPITAL | Age: 25
End: 2020-03-06

## 2020-03-06 ENCOUNTER — LAB (OUTPATIENT)
Dept: LAB | Facility: HOSPITAL | Age: 25
End: 2020-03-06

## 2020-03-06 VITALS
RESPIRATION RATE: 16 BRPM | OXYGEN SATURATION: 100 % | WEIGHT: 145.3 LBS | TEMPERATURE: 98.5 F | HEART RATE: 89 BPM | SYSTOLIC BLOOD PRESSURE: 100 MMHG | BODY MASS INDEX: 24.81 KG/M2 | HEIGHT: 64 IN | DIASTOLIC BLOOD PRESSURE: 61 MMHG

## 2020-03-06 DIAGNOSIS — E61.1 IRON DEFICIENCY: Primary | ICD-10-CM

## 2020-03-06 DIAGNOSIS — E53.8 B12 DEFICIENCY: ICD-10-CM

## 2020-03-06 DIAGNOSIS — T45.4X5A ADVERSE EFFECT OF IRON, INITIAL ENCOUNTER: ICD-10-CM

## 2020-03-06 LAB
BASOPHILS # BLD AUTO: 0.05 10*3/MM3 (ref 0–0.2)
BASOPHILS NFR BLD AUTO: 0.5 % (ref 0–1.5)
DEPRECATED RDW RBC AUTO: 71 FL (ref 37–54)
EOSINOPHIL # BLD AUTO: 0.11 10*3/MM3 (ref 0–0.4)
EOSINOPHIL NFR BLD AUTO: 1.1 % (ref 0.3–6.2)
ERYTHROCYTE [DISTWIDTH] IN BLOOD BY AUTOMATED COUNT: 29.2 % (ref 12.3–15.4)
HCT VFR BLD AUTO: 34 % (ref 34–46.6)
HGB BLD-MCNC: 9.8 G/DL (ref 12–15.9)
IMM GRANULOCYTES # BLD AUTO: 0.21 10*3/MM3 (ref 0–0.05)
IMM GRANULOCYTES NFR BLD AUTO: 2 % (ref 0–0.5)
LYMPHOCYTES # BLD AUTO: 2.52 10*3/MM3 (ref 0.7–3.1)
LYMPHOCYTES NFR BLD AUTO: 24.6 % (ref 19.6–45.3)
MCH RBC QN AUTO: 20.9 PG (ref 26.6–33)
MCHC RBC AUTO-ENTMCNC: 28.8 G/DL (ref 31.5–35.7)
MCV RBC AUTO: 72.5 FL (ref 79–97)
MONOCYTES # BLD AUTO: 0.68 10*3/MM3 (ref 0.1–0.9)
MONOCYTES NFR BLD AUTO: 6.6 % (ref 5–12)
NEUTROPHILS # BLD AUTO: 6.68 10*3/MM3 (ref 1.7–7)
NEUTROPHILS NFR BLD AUTO: 65.2 % (ref 42.7–76)
NRBC BLD AUTO-RTO: 0.2 /100 WBC (ref 0–0.2)
PLATELET # BLD AUTO: 209 10*3/MM3 (ref 140–450)
RBC # BLD AUTO: 4.69 10*6/MM3 (ref 3.77–5.28)
WBC NRBC COR # BLD: 10.25 10*3/MM3 (ref 3.4–10.8)

## 2020-03-06 PROCEDURE — 36415 COLL VENOUS BLD VENIPUNCTURE: CPT

## 2020-03-06 PROCEDURE — 96366 THER/PROPH/DIAG IV INF ADDON: CPT

## 2020-03-06 PROCEDURE — 96372 THER/PROPH/DIAG INJ SC/IM: CPT

## 2020-03-06 PROCEDURE — 25010000002 CYANOCOBALAMIN PER 1000 MCG: Performed by: NURSE PRACTITIONER

## 2020-03-06 PROCEDURE — 25010000002 IRON SUCROSE PER 1 MG: Performed by: INTERNAL MEDICINE

## 2020-03-06 PROCEDURE — 85025 COMPLETE CBC W/AUTO DIFF WBC: CPT

## 2020-03-06 PROCEDURE — 96365 THER/PROPH/DIAG IV INF INIT: CPT

## 2020-03-06 PROCEDURE — 63710000001 DIPHENHYDRAMINE PER 50 MG: Performed by: INTERNAL MEDICINE

## 2020-03-06 PROCEDURE — 99213 OFFICE O/P EST LOW 20 MIN: CPT | Performed by: NURSE PRACTITIONER

## 2020-03-06 RX ORDER — CYANOCOBALAMIN 1000 UG/ML
1000 INJECTION, SOLUTION INTRAMUSCULAR; SUBCUTANEOUS ONCE
Status: COMPLETED | OUTPATIENT
Start: 2020-03-06 | End: 2020-03-06

## 2020-03-06 RX ORDER — SODIUM CHLORIDE 9 MG/ML
250 INJECTION, SOLUTION INTRAVENOUS ONCE
Status: COMPLETED | OUTPATIENT
Start: 2020-03-06 | End: 2020-03-06

## 2020-03-06 RX ORDER — DIPHENHYDRAMINE HCL 25 MG
25 CAPSULE ORAL ONCE
Status: COMPLETED | OUTPATIENT
Start: 2020-03-06 | End: 2020-03-06

## 2020-03-06 RX ORDER — ACETAMINOPHEN 325 MG/1
650 TABLET ORAL ONCE
Status: COMPLETED | OUTPATIENT
Start: 2020-03-06 | End: 2020-03-06

## 2020-03-06 RX ADMIN — CYANOCOBALAMIN 1000 MCG: 1000 INJECTION, SOLUTION INTRAMUSCULAR at 16:11

## 2020-03-06 RX ADMIN — DIPHENHYDRAMINE HYDROCHLORIDE 25 MG: 25 CAPSULE ORAL at 14:29

## 2020-03-06 RX ADMIN — IRON SUCROSE 300 MG: 20 INJECTION, SOLUTION INTRAVENOUS at 14:40

## 2020-03-06 RX ADMIN — SODIUM CHLORIDE 250 ML: 9 INJECTION, SOLUTION INTRAVENOUS at 14:30

## 2020-03-06 RX ADMIN — ACETAMINOPHEN 650 MG: 325 TABLET ORAL at 14:29

## 2020-03-06 NOTE — PROGRESS NOTES
Subjective .     REASONS FOR FOLLOWUP:    1.   7, para 4, 33 weeks and 4 days pregnant  2.  History of syphilis, infectious disease been consulted.  Patient received 3 penicillin injections by the health department  3.  Anemia likely secondary to iron deficiency and B12 deficiency.       HISTORY OF PRESENT ILLNESS:  The patient is a 24 y.o. year old female who is here for follow-up with the above-mentioned history.    History of Present Illness      The patient is a 24 y.o. female with iron deficiency and B12 deficiency of pregnancy, who presents the office today in anticipation of weekly Venofer and B12.  We saw the patient in consultation during her hospitalization for proteinuria.  She was admitted for 24-hour urine which returned at 180 mg of protein.  During hospitalization, she was noted to be anemic with hemoglobin of 7.1.  Further work-up revealed iron deficiency and B12 deficiency.  She therefore went on to receive 3 doses of IV Venofer inpatient and B12 daily for 3 days.  Additionally, infectious disease was consulted due to syphilis infection.  She is receiving treatment through the health department.   The patient reports today she is feeling well.  Her energy is adequate.  She denies shortness of breath or chest pain.  She denies signs or symptoms of bleeding.  She is being seen regularly through her OB/GYN with ultrasound to monitor the growth of the baby.    Past Medical History:   Diagnosis Date   • History of anemia    • History of miscarriage    • History of transfusion     multple blood transfusions during pregnancy due to severe anemia   • History of urinary tract infection    • Migraine    • Syphilis 2019    FTA positive during pregnancy, [facility]       ONCOLOGIC HISTORY:  (History from previous dates can be found in the separate document.)    Current Outpatient Medications on File Prior to Visit   Medication Sig Dispense Refill   • Prenatal MV-Min-Fe Fum-FA-DHA  (PRENATAL 1) 30-0.975-200 MG capsule Take 1 tablet by mouth Daily. 30 capsule 6   • calcium carbonate (TUMS) 500 MG chewable tablet Chew 1 tablet Daily.       Current Facility-Administered Medications on File Prior to Visit   Medication Dose Route Frequency Provider Last Rate Last Dose   • acetaminophen (TYLENOL) tablet 650 mg  650 mg Oral Once Roxie Max MD       • cyanocobalamin injection 1,000 mcg  1,000 mcg Intramuscular Once Heather Gomez APRN       • diphenhydrAMINE (BENADRYL) capsule 25 mg  25 mg Oral Once Roxie Max MD       • iron sucrose (VENOFER) 300 mg in sodium chloride 0.9 % 100 mL IVPB  300 mg Intravenous Once Roxie Max MD       • sodium chloride 0.9 % infusion 250 mL  250 mL Intravenous Once Roxie Max MD           ALLERGIES:   No Known Allergies    Social History     Socioeconomic History   • Marital status: Single     Spouse name: Not on file   • Number of children: 5   • Years of education: Not on file   • Highest education level: Not on file   Occupational History     Employer: [id number]   Tobacco Use   • Smoking status: Former Smoker     Packs/day: 0.50     Types: Cigars     Last attempt to quit: 2019     Years since quittin.3   • Smokeless tobacco: Never Used   Substance and Sexual Activity   • Alcohol use: Never     Frequency: Never   • Drug use: Never   • Sexual activity: Yes     Partners: Male     Birth control/protection: None         Cancer-related family history is not on file.     I have reviewed the patient's medical history in detail and updated the computerized patient record.    Review of Systems   Constitutional: Negative for chills, fatigue and fever.   HENT: Negative for mouth sores and sore throat.    Eyes: Negative for visual disturbance.   Respiratory: Negative for cough and shortness of breath.    Gastrointestinal: Negative for abdominal pain, diarrhea, nausea and vomiting.   Genitourinary: Negative for dysuria and frequency.  "  Neurological: Negative for dizziness and weakness.   Hematological: Does not bruise/bleed easily.   Psychiatric/Behavioral: The patient is not nervous/anxious.    All other systems reviewed and are negative.  Review of systems 2020  unchanged from previous office visit except as updated.      Objective      Vitals:    20 1400   BP: 100/61   Pulse: 89   Resp: 16   Temp: 98.5 °F (36.9 °C)   SpO2: 100%   Weight: 65.9 kg (145 lb 4.8 oz)   Height: 162.5 cm (63.98\")  Comment: new ht w/shoes   PainSc: 0-No pain       Current Status 3/6/2020   ECOG score 0       Physical Exam    GENERAL:  Well-developed, well-nourished in no acute distress.   CHEST:  Lungs clear to auscultation. Good airflow.  CARDIAC:  Regular rate and rhythm without murmurs, rubs or gallops. Normal S1,S2.  ABDOMEN:   uterus.  EXTREMITIES:  No clubbing, cyanosis or edema.  NEUROLOGICAL:  Cranial Nerves II-XII grossly intact.  No focal neurological deficits.  PSYCHIATRIC:  Normal affect and mood.    Physical exam 2020  unchanged from previous office visit except as updated.      RECENT LABS:  Results from last 7 days   Lab Units 20  1413   WBC 10*3/mm3 10.25   NEUTROS ABS 10*3/mm3 6.68   HEMOGLOBIN g/dL 9.8*   HEMATOCRIT % 34.0   PLATELETS 10*3/mm3 209               Assessment/Plan     1.  Multifactorial anemia with iron deficiency and B12 deficiency.   Ferritin of 5.7, iron saturation 10%.  Intolerant to oral iron with constipation and nausea.  B12 231.  Hemoglobin as low as 7.1.  Status post 3 doses of IV Venofer, and daily B12 x3 while inpatient.   Today, 3/6/2020, hemoglobin is improved to 9.8.  Plan to continue weekly Venofer and B12 injections until delivery.     2.   7 para 4 now 33 weeks and 4 days pregnant.  Patient has not been compliant with her prenatal care.     3.  History of syphilis for which she received 3 penicillin injections at the health department and infectious disease consulted while " inpatient.        Plan:  1. Proceed with Venofer 300 mg today  2. Received with B12 injection  3. Return weekly for IV Venofer and B12 injections until delivery.  4. Continue to follow-up with OB/GYN as scheduled.    Heather Gomez, APRN  03/06/2020

## 2020-03-11 ENCOUNTER — PROCEDURE VISIT (OUTPATIENT)
Dept: OBSTETRICS AND GYNECOLOGY | Age: 25
End: 2020-03-11

## 2020-03-11 ENCOUNTER — ROUTINE PRENATAL (OUTPATIENT)
Dept: OBSTETRICS AND GYNECOLOGY | Age: 25
End: 2020-03-11

## 2020-03-11 VITALS — BODY MASS INDEX: 25.01 KG/M2 | WEIGHT: 145.6 LBS | SYSTOLIC BLOOD PRESSURE: 100 MMHG | DIASTOLIC BLOOD PRESSURE: 60 MMHG

## 2020-03-11 DIAGNOSIS — O99.019 ANEMIA AFFECTING SEVENTH PREGNANCY: ICD-10-CM

## 2020-03-11 DIAGNOSIS — O34.211 MATERNAL CARE DUE TO LOW TRANSVERSE UTERINE SCAR FROM PREVIOUS CESAREAN DELIVERY: ICD-10-CM

## 2020-03-11 DIAGNOSIS — E61.1 IRON DEFICIENCY: ICD-10-CM

## 2020-03-11 DIAGNOSIS — O99.330 TOBACCO USE DURING PREGNANCY, ANTEPARTUM: ICD-10-CM

## 2020-03-11 DIAGNOSIS — O09.299 HISTORY OF POSTPARTUM HEMORRHAGE, CURRENTLY PREGNANT: ICD-10-CM

## 2020-03-11 DIAGNOSIS — O09.40 ANEMIA AFFECTING SEVENTH PREGNANCY: ICD-10-CM

## 2020-03-11 DIAGNOSIS — O98.119 SYPHILIS OF MOTHER DURING PREGNANCY: ICD-10-CM

## 2020-03-11 DIAGNOSIS — Z3A.36 36 WEEKS GESTATION OF PREGNANCY: Primary | ICD-10-CM

## 2020-03-11 DIAGNOSIS — O09.30 HISTORY OF INADEQUATE PRENATAL CARE: Primary | ICD-10-CM

## 2020-03-11 DIAGNOSIS — Z36.85 ANTENATAL SCREENING FOR STREPTOCOCCUS B: ICD-10-CM

## 2020-03-11 DIAGNOSIS — O34.219 PATIENT DESIRES VAGINAL BIRTH AFTER CESAREAN SECTION (VBAC): ICD-10-CM

## 2020-03-11 PROCEDURE — 99214 OFFICE O/P EST MOD 30 MIN: CPT | Performed by: OBSTETRICS & GYNECOLOGY

## 2020-03-11 PROCEDURE — 76819 FETAL BIOPHYS PROFIL W/O NST: CPT | Performed by: OBSTETRICS & GYNECOLOGY

## 2020-03-11 NOTE — PROGRESS NOTES
Pregnancy at 36 4/7 wk gest  Having rare contractions, no leakage of fluid, good movement  ROS- all negative except some contractions  Exam-awake/alert in nad  Abd-soft gravid NT  extr- NT  Pelvic- no vulvar lesions, cervix soft/anterior 60%/1-2/-3  Neuro- no deficits  US today BPP  DANIEL=17.7  A/P pregnancy at 36 4/7 wk gest  1. Severe anemia- getting IV iron with Heme  2. Syphilis this pregnancy, s/p tx, f/u 6 mo  3. Prior C/S x1- desires . We reviewed the  consent together. I read the entire form to her. She still desires to undergo  ABISAI. Consents signed and in chart. Pt given a copy of the form to review at home. We reviewed the small but potentially catastrophic risk of uterine rupture with maternal and fetal death.   4. GBS screening today  5. LGSIL- plan colpo postpartum  6. Reviewed labor warnings and kick counts  F/u 1 wk w/ US growth and BPP

## 2020-03-13 ENCOUNTER — HOSPITAL ENCOUNTER (EMERGENCY)
Facility: HOSPITAL | Age: 25
Discharge: HOME | End: 2020-03-14
Attending: OBSTETRICS & GYNECOLOGY | Admitting: OBSTETRICS & GYNECOLOGY

## 2020-03-13 ENCOUNTER — APPOINTMENT (OUTPATIENT)
Dept: ULTRASOUND IMAGING | Facility: HOSPITAL | Age: 25
End: 2020-03-13

## 2020-03-13 LAB — GP B STREP DNA SPEC QL NAA+PROBE: NEGATIVE

## 2020-03-13 PROCEDURE — 99283 EMERGENCY DEPT VISIT LOW MDM: CPT | Performed by: OBSTETRICS & GYNECOLOGY

## 2020-03-13 PROCEDURE — 76818 FETAL BIOPHYS PROFILE W/NST: CPT

## 2020-03-13 PROCEDURE — 99284 EMERGENCY DEPT VISIT MOD MDM: CPT

## 2020-03-14 VITALS
DIASTOLIC BLOOD PRESSURE: 64 MMHG | HEART RATE: 96 BPM | SYSTOLIC BLOOD PRESSURE: 101 MMHG | HEIGHT: 63 IN | TEMPERATURE: 98 F | RESPIRATION RATE: 16 BRPM | BODY MASS INDEX: 25.69 KG/M2 | WEIGHT: 145 LBS | OXYGEN SATURATION: 98 %

## 2020-03-14 PROCEDURE — 59025 FETAL NON-STRESS TEST: CPT | Performed by: OBSTETRICS & GYNECOLOGY

## 2020-03-14 PROCEDURE — 59025 FETAL NON-STRESS TEST: CPT

## 2020-03-14 NOTE — ED PROVIDER NOTES
[facility] Provider Note        3/13/2020 20:23    Chiquita Galvez is a 24 y.o.  at 36w6d MARISA  2020, Date entered prior to episode creation who presents for : Contractions (Started last night around 1900 got worse today and had some bloody show. Delivered last baby at 31 weeks. Denies LOF. +FM, but decreased from normal.)          HPI: Ms. Galvez is a 24-year-old -1-1-5 at 36 weeks and 6 days who presents with contractions, present but slightly decreased fetal movement.  She notes that she has been having contractions on and off for a week, today they were a little bit more painful.  She is unable to quantify exactly how often they are happening, they seem to be more frequent for little while and then they space out to every hour or 2 hours.    She has had a small amount of bleeding today that is happened twice when she is going to the bathroom and wiped.  It is bright red and a small amount.  There is none in her underwear.    She denies any loss of fluid    Prenatal care with Dr. Mckeon.  Her prenatal care is complicated by history of  delivery due to fetal distress in last pregnancy, severe iron deficiency anemia, syphilis, inadequate prenatal care, tobacco use in pregnancy.  She also has a history of postpartum hemorrhage.  She has been thoroughly counseled and desires TOLAC.    Patient Active Problem List    Diagnosis   • LGSIL on Pap smear of cervix [R87.612]   • Anemia affecting seventh pregnancy [O99.019, O09.40]   • Iron deficiency [E61.1]   • Iron adverse reaction [T45.4X5A]   • History of postpartum hemorrhage, currently pregnant [O09.299]   • Patient desires vaginal birth after  section () [O34.219]   • Pregnancy [Z34.90]   • Proteinuria affecting pregnancy in third trimester [O12.13]   • Hypokalemia [E87.6]   • Maternal care due to low transverse uterine scar from previous  delivery [O34.211]   • History of inadequate prenatal care [O09.30]   •  Syphilis of mother during pregnancy [O98.119]   • History of  labor [Z87.51]   • Tobacco use during pregnancy, antepartum [O99.330]         POB:   OB History    Para Term  AB Living   7 5 4 1 1 5   SAB TAB Ectopic Molar Multiple Live Births   1 0 0 0 0 5      # Outcome Date GA Lbr Andrew/2nd Weight Sex Delivery Anes PTL Lv   7 Current            6  17 34w1d  1984 g (4 lb 6 oz) F CS-LTranv   RUTHY      Birth Comments: BPP 4/10       Complications: Abnormal  test   5 Term 11/08/15   3714 g (8 lb 3 oz) M Vag-Spont   RUTHY      Birth Comments: del note reviewed - [facility]       Complications: Postpartum hemorrhage   4 Term 14   3544 g (7 lb 13 oz) F Vag-Spont   RUTHY      Birth Comments: del note reviewed - no comps - J[facility]    3 Term 13   3685 g (8 lb 2 oz) M Vag-Spont   RUTHY      Birth Comments: del note reviewed - no comps    2 Term 12   3742 g (8 lb 4 oz) M Vag-Vacuum   RUTHY      Birth Comments: del note reviewed - no comps - [facility]    1 SAB  16w0d             Birth Comments: required D&C for retained placenta      PMH:   Past Medical History:   Diagnosis Date   • History of anemia    • History of miscarriage    • History of transfusion     multple blood transfusions during pregnancy due to severe anemia   • History of urinary tract infection    • Migraine    • Syphilis 2019    FTA positive during pregnancy[facility]     PSH:   Past Surgical History:   Procedure Laterality Date   •  SECTION     • DILATATION AND CURETTAGE  2009    Miscarriage     FH:  No family history on file.  SH:   Social History     Tobacco Use   • Smoking status: Former Smoker     Packs/day: 0.50     Types: Cigars     Last attempt to quit: 2019     Years since quittin.3   • Smokeless tobacco: Never Used   Substance Use Topics   • Alcohol use: Never     Frequency: Never   • Drug use: Never       Allergies: Patient has no known allergies.    Medications:  "  Medications Prior to Admission   Medication Sig Dispense Refill Last Dose   • Prenatal MV-Min-Fe Fum-FA-DHA (PRENATAL 1) 30-0.975-200 MG capsule Take 1 tablet by mouth Daily. 30 capsule 6 3/13/2020 at 0800   • calcium carbonate (TUMS) 500 MG chewable tablet Chew 1 tablet Daily.   Not Taking       Review of Systems  A 14 system review was completed and negative except for what is noted in the HPI       Physical exam    Temp:  [98 °F (36.7 °C)] 98 °F (36.7 °C)  Heart Rate:  [94] 94  Resp:  [16] 16  BP: (107)/(63) 107/63  Estimated body mass index is 25.69 kg/m² as calculated from the following:    Height as of this encounter: 160 cm (63\").    Weight as of this encounter: 65.8 kg (145 lb).    General appearance - alert, well appearing, and in no distress  Chest - clear to auscultation, no wheezes, rales or rhonchi, symmetric air entry  Heart - normal rate and regular rhythm, S1 and S2 normal  Abdomen - uterus soft, nontender, when states has contraction the uterus is soft  Neurological - alert, oriented, normal speech, no focal findings or movement disorder noted  Extremities - no pedal edema noted, no calf TTP  Skin - normal coloration and turgor, no rashes, no suspicious skin lesions noted  Psych: normal affect, full range of emotion. Does not appear anxious.    Tracing reactive, with moderate variability and + accelerations, there was a spontaneous deceleration at 1912 to 90 bpm resolving in two minutes with preserved variability. There was a spontaneous variability at 2132 and no further deceleration on three hours of monitoring after the variable deceleration  There was one contraction noted.      Uterine Activity Assessment  Method: external tocotransducer  Contraction Frequency (Minutes): 0  Contraction Duration (sec): 0  Contraction Intensity: no contractions  Uterine Resting Tone: soft by palpation  Contraction Pattern: Irritability  Uterine Tenderness: No    Membranes: Intact           Vaginal Exam  Method: " sterile exam per RN  Vaginal Bleeding: not present  Cervical Position: posterior  Cervical Consistency: soft  Cervical Dilation (cm): 1-2  Cervical Effacement: 60%  Fetal Station: -3               U/S reviewed: BPP during evaluation is 8/8, DANIEL normal, vertex presentation       External Prenatal Results     Pregnancy Outside Results - Transcribed From Office Records - See Scanned Records For Details     Test Value Date Time    Hgb 9.8 g/dL 03/06/20 1413      7.2 g/dL 02/21/20 0426      7.1 g/dL 02/19/20 1848      8.3 g/dL 02/12/20 1026    Hct 34.0 % 03/06/20 1413      23.9 % 02/21/20 0426      24.2 % 02/19/20 1848      29.3 % 02/12/20 1026    ABO B  02/20/20 1322    Rh Positive  02/20/20 1322    Antibody Screen Negative  02/20/20 1322      Negative  02/12/20 1026      Normal  02/12/20       Negative  01/23/20 1211    Glucose Fasting GTT       Glucose Tolerance Test 1 hour       Glucose Tolerance Test 3 hour       Gonorrhea (discrete) Negative  02/12/20 1053    Chlamydia (discrete) Negative  02/12/20 1053    RPR Reactive  02/12/20 1026    VDRL       Syphilis Antibody Reactive  02/12/20 1026      >8.0 AI 01/23/20 1211    Rubella 13.20 index 02/12/20 1026    HBsAg Negative  02/12/20 1026      Nonreactive  01/23/20 1211    Herpes Simplex Virus PCR       Herpes Simplex VIrus Culture       HIV Non Reactive  02/12/20 1026      Nonreactive  01/23/20 1211    Hep C RNA Quant PCR       Hep C Antibody 0.1 s/co ratio 02/12/20 1026      Nonreactive  01/23/20 1211    AFP       Group B Strep Negative  03/11/20 1634    GBS Susceptibility to Clindamycin       GBS Susceptibility to Erythromycin       Fetal Fibronectin       Genetic Testing, Maternal Blood             Drug Screening     Test Value Date Time    Urine Drug Screen       Amphetamine Screen Negative ng/mL 02/12/20 1054      Negative (arb'U) 01/23/20 1211    Barbiturate Screen Negative  02/20/20 2032      Negative ng/mL 02/12/20 1054    Benzodiazepine Screen Negative   20 203      Negative ng/mL 20 1054    Methadone Screen Negative  20      Negative ng/mL 20 1054    Phencyclidine Screen Negative ng/mL 20 1054    Opiates Screen Negative  20      Negative  20 1211    THC Screen Negative  20    Cocaine Screen       Propoxyphene Screen Negative ng/mL 20 1054    Buprenorphine Screen       Methamphetamine Screen       Oxycodone Screen Negative  20    Tricyclic Antidepressants Screen                     Impression:   @ 36w6d .  Final Diagnosis:   Resolved decreased fetal movement    Plan:  1. Her cervical exam is stable, she is not wally on tocometry or on exam and is not in labor. She notes decreased fetal movement, however she does continue to note consistent movement during presentation.  BPP is normal 8 out of 8 and normal DANIEL.  She had a spontaneous deceleration and a variable deceleration with none further on prolonged monitoring. Reviewed labor precautions, fetal movement precautions and to follow up with Dr. Mckeon next week  2. Plan of care has been reviewed with patient and her friend  3.  Risks, benefits of treatment plan have been discussed.  4.  All questions have been answered.      Telma Pisano MD  3/13/2020  20:23

## 2020-03-14 NOTE — NON STRESS TEST
Chiquita Galvez, a  at 37w0d with an MARISA of 2020, Date entered prior to episode creation, was seen at [facility] for a nonstress test.    Chief Complaint   Patient presents with   • Contractions     Started last night around 1900 got worse today and had some bloody show. Delivered last baby at 31 weeks. Denies LOF. +FM, but decreased from normal.       Patient Active Problem List   Diagnosis   • Maternal care due to low transverse uterine scar from previous  delivery   • History of inadequate prenatal care   • Syphilis of mother during pregnancy   • History of  labor   • Tobacco use during pregnancy, antepartum   • Pregnancy   • Proteinuria affecting pregnancy in third trimester   • Hypokalemia   • History of postpartum hemorrhage, currently pregnant   • Patient desires vaginal birth after  section ()   • Iron deficiency   • Iron adverse reaction   • LGSIL on Pap smear of cervix   • Anemia affecting seventh pregnancy       Start Time:   Stop Time: 32

## 2020-03-18 ENCOUNTER — ANESTHESIA EVENT (OUTPATIENT)
Dept: LABOR AND DELIVERY | Facility: HOSPITAL | Age: 25
End: 2020-03-18

## 2020-03-18 ENCOUNTER — PROCEDURE VISIT (OUTPATIENT)
Dept: OBSTETRICS AND GYNECOLOGY | Age: 25
End: 2020-03-18

## 2020-03-18 ENCOUNTER — ROUTINE PRENATAL (OUTPATIENT)
Dept: OBSTETRICS AND GYNECOLOGY | Age: 25
End: 2020-03-18

## 2020-03-18 ENCOUNTER — ANESTHESIA (OUTPATIENT)
Dept: LABOR AND DELIVERY | Facility: HOSPITAL | Age: 25
End: 2020-03-18

## 2020-03-18 ENCOUNTER — HOSPITAL ENCOUNTER (INPATIENT)
Facility: HOSPITAL | Age: 25
LOS: 3 days | Discharge: HOME | End: 2020-03-21
Attending: OBSTETRICS & GYNECOLOGY | Admitting: OBSTETRICS & GYNECOLOGY

## 2020-03-18 VITALS — BODY MASS INDEX: 26.39 KG/M2 | DIASTOLIC BLOOD PRESSURE: 54 MMHG | WEIGHT: 149 LBS | SYSTOLIC BLOOD PRESSURE: 108 MMHG

## 2020-03-18 DIAGNOSIS — Z3A.37 37 WEEKS GESTATION OF PREGNANCY: Primary | ICD-10-CM

## 2020-03-18 DIAGNOSIS — E61.1 IRON DEFICIENCY: Primary | ICD-10-CM

## 2020-03-18 PROBLEM — O36.5990 POOR FETAL GROWTH AFFECTING MANAGEMENT OF MOTHER, ANTEPARTUM: Status: ACTIVE | Noted: 2020-03-18

## 2020-03-18 PROBLEM — O36.5931 IUGR (INTRAUTERINE GROWTH RESTRICTION) AFFECTING CARE OF MOTHER, THIRD TRIMESTER, FETUS 1: Status: RESOLVED | Noted: 2020-03-18 | Resolved: 2020-03-18

## 2020-03-18 PROBLEM — O36.5931 IUGR (INTRAUTERINE GROWTH RESTRICTION) AFFECTING CARE OF MOTHER, THIRD TRIMESTER, FETUS 1: Status: ACTIVE | Noted: 2020-03-18

## 2020-03-18 PROBLEM — Z34.90 PREGNANT: Status: ACTIVE | Noted: 2020-03-18

## 2020-03-18 LAB
ABO GROUP BLD: NORMAL
AMPHET+METHAMPHET UR QL: NEGATIVE
BARBITURATES UR QL SCN: NEGATIVE
BASOPHILS # BLD AUTO: 0.03 10*3/MM3 (ref 0–0.2)
BASOPHILS NFR BLD AUTO: 0.4 % (ref 0–1.5)
BENZODIAZ UR QL SCN: NEGATIVE
BLD GP AB SCN SERPL QL: NEGATIVE
CANNABINOIDS SERPL QL: NEGATIVE
COCAINE UR QL: NEGATIVE
DEPRECATED RDW RBC AUTO: 69.5 FL (ref 37–54)
EOSINOPHIL # BLD AUTO: 0.07 10*3/MM3 (ref 0–0.4)
EOSINOPHIL NFR BLD AUTO: 0.9 % (ref 0.3–6.2)
ERYTHROCYTE [DISTWIDTH] IN BLOOD BY AUTOMATED COUNT: 29.2 % (ref 12.3–15.4)
HCT VFR BLD AUTO: 33.8 % (ref 34–46.6)
HGB BLD-MCNC: 10.4 G/DL (ref 12–15.9)
LYMPHOCYTES # BLD AUTO: 2.04 10*3/MM3 (ref 0.7–3.1)
LYMPHOCYTES NFR BLD AUTO: 25.2 % (ref 19.6–45.3)
MCH RBC QN AUTO: 22.1 PG (ref 26.6–33)
MCHC RBC AUTO-ENTMCNC: 30.8 G/DL (ref 31.5–35.7)
MCV RBC AUTO: 71.8 FL (ref 79–97)
METHADONE UR QL SCN: NEGATIVE
MONOCYTES # BLD AUTO: 0.59 10*3/MM3 (ref 0.1–0.9)
MONOCYTES NFR BLD AUTO: 7.3 % (ref 5–12)
NEUTROPHILS # BLD AUTO: 5.33 10*3/MM3 (ref 1.7–7)
NEUTROPHILS NFR BLD AUTO: 65.7 % (ref 42.7–76)
OPIATES UR QL: NEGATIVE
OXYCODONE UR QL SCN: NEGATIVE
PLATELET # BLD AUTO: 191 10*3/MM3 (ref 140–450)
PMV BLD AUTO: ABNORMAL FL
RBC # BLD AUTO: 4.71 10*6/MM3 (ref 3.77–5.28)
RH BLD: POSITIVE
T&S EXPIRATION DATE: NORMAL
WBC NRBC COR # BLD: 8.1 10*3/MM3 (ref 3.4–10.8)

## 2020-03-18 PROCEDURE — 80307 DRUG TEST PRSMV CHEM ANLYZR: CPT | Performed by: OBSTETRICS & GYNECOLOGY

## 2020-03-18 PROCEDURE — 25010000002 MORPHINE PER 10 MG: Performed by: ANESTHESIOLOGY

## 2020-03-18 PROCEDURE — 25010000002 ONDANSETRON PER 1 MG: Performed by: OBSTETRICS & GYNECOLOGY

## 2020-03-18 PROCEDURE — 25010000003 CEFAZOLIN IN DEXTROSE 2-4 GM/100ML-% SOLUTION: Performed by: OBSTETRICS & GYNECOLOGY

## 2020-03-18 PROCEDURE — 86901 BLOOD TYPING SEROLOGIC RH(D): CPT | Performed by: OBSTETRICS & GYNECOLOGY

## 2020-03-18 PROCEDURE — 76816 OB US FOLLOW-UP PER FETUS: CPT | Performed by: OBSTETRICS & GYNECOLOGY

## 2020-03-18 PROCEDURE — 86850 RBC ANTIBODY SCREEN: CPT | Performed by: OBSTETRICS & GYNECOLOGY

## 2020-03-18 PROCEDURE — 76819 FETAL BIOPHYS PROFIL W/O NST: CPT | Performed by: OBSTETRICS & GYNECOLOGY

## 2020-03-18 PROCEDURE — 25010000002 FENTANYL CITRATE (PF) 100 MCG/2ML SOLUTION: Performed by: ANESTHESIOLOGY

## 2020-03-18 PROCEDURE — 88307 TISSUE EXAM BY PATHOLOGIST: CPT

## 2020-03-18 PROCEDURE — 25010000002 DIPHENHYDRAMINE PER 50 MG: Performed by: ANESTHESIOLOGY

## 2020-03-18 PROCEDURE — 85025 COMPLETE CBC W/AUTO DIFF WBC: CPT | Performed by: OBSTETRICS & GYNECOLOGY

## 2020-03-18 PROCEDURE — 99214 OFFICE O/P EST MOD 30 MIN: CPT | Performed by: OBSTETRICS & GYNECOLOGY

## 2020-03-18 PROCEDURE — 86900 BLOOD TYPING SEROLOGIC ABO: CPT | Performed by: OBSTETRICS & GYNECOLOGY

## 2020-03-18 PROCEDURE — 25010000002 PHENYLEPHRINE PER 1 ML: Performed by: ANESTHESIOLOGY

## 2020-03-18 PROCEDURE — 59515 CESAREAN DELIVERY: CPT | Performed by: OBSTETRICS & GYNECOLOGY

## 2020-03-18 PROCEDURE — S0260 H&P FOR SURGERY: HCPCS | Performed by: OBSTETRICS & GYNECOLOGY

## 2020-03-18 RX ORDER — MORPHINE SULFATE 2 MG/ML
2 INJECTION, SOLUTION INTRAMUSCULAR; INTRAVENOUS
Status: DISPENSED | OUTPATIENT
Start: 2020-03-18 | End: 2020-03-19

## 2020-03-18 RX ORDER — SODIUM CHLORIDE, SODIUM LACTATE, POTASSIUM CHLORIDE, CALCIUM CHLORIDE 600; 310; 30; 20 MG/100ML; MG/100ML; MG/100ML; MG/100ML
INJECTION, SOLUTION INTRAVENOUS CONTINUOUS PRN
Status: DISCONTINUED | OUTPATIENT
Start: 2020-03-18 | End: 2020-03-18 | Stop reason: SURG

## 2020-03-18 RX ORDER — CARBOPROST TROMETHAMINE 250 UG/ML
250 INJECTION, SOLUTION INTRAMUSCULAR AS NEEDED
Status: DISCONTINUED | OUTPATIENT
Start: 2020-03-18 | End: 2020-03-18 | Stop reason: HOSPADM

## 2020-03-18 RX ORDER — ONDANSETRON 2 MG/ML
4 INJECTION INTRAMUSCULAR; INTRAVENOUS EVERY 6 HOURS PRN
Status: DISCONTINUED | OUTPATIENT
Start: 2020-03-18 | End: 2020-03-21 | Stop reason: HOSPADM

## 2020-03-18 RX ORDER — MISOPROSTOL 200 UG/1
600 TABLET ORAL ONCE AS NEEDED
Status: DISCONTINUED | OUTPATIENT
Start: 2020-03-18 | End: 2020-03-21 | Stop reason: HOSPADM

## 2020-03-18 RX ORDER — SIMETHICONE 80 MG
80 TABLET,CHEWABLE ORAL 4 TIMES DAILY PRN
Status: DISCONTINUED | OUTPATIENT
Start: 2020-03-18 | End: 2020-03-21 | Stop reason: HOSPADM

## 2020-03-18 RX ORDER — OXYTOCIN-SODIUM CHLORIDE 0.9% IV SOLN 30 UNIT/500ML 30-0.9/5 UT/ML-%
125 SOLUTION INTRAVENOUS CONTINUOUS PRN
Status: DISCONTINUED | OUTPATIENT
Start: 2020-03-18 | End: 2020-03-21 | Stop reason: HOSPADM

## 2020-03-18 RX ORDER — METHYLERGONOVINE MALEATE 0.2 MG/ML
200 INJECTION INTRAVENOUS ONCE AS NEEDED
Status: DISCONTINUED | OUTPATIENT
Start: 2020-03-18 | End: 2020-03-21 | Stop reason: HOSPADM

## 2020-03-18 RX ORDER — LIDOCAINE HYDROCHLORIDE 10 MG/ML
5 INJECTION, SOLUTION EPIDURAL; INFILTRATION; INTRACAUDAL; PERINEURAL AS NEEDED
Status: DISCONTINUED | OUTPATIENT
Start: 2020-03-18 | End: 2020-03-18

## 2020-03-18 RX ORDER — SODIUM CHLORIDE 0.9 % (FLUSH) 0.9 %
10 SYRINGE (ML) INJECTION AS NEEDED
Status: DISCONTINUED | OUTPATIENT
Start: 2020-03-18 | End: 2020-03-18

## 2020-03-18 RX ORDER — MISOPROSTOL 200 UG/1
800 TABLET ORAL AS NEEDED
Status: DISCONTINUED | OUTPATIENT
Start: 2020-03-18 | End: 2020-03-18 | Stop reason: HOSPADM

## 2020-03-18 RX ORDER — OXYCODONE HYDROCHLORIDE AND ACETAMINOPHEN 5; 325 MG/1; MG/1
2 TABLET ORAL EVERY 4 HOURS PRN
Status: DISCONTINUED | OUTPATIENT
Start: 2020-03-18 | End: 2020-03-21 | Stop reason: HOSPADM

## 2020-03-18 RX ORDER — LANOLIN
CREAM (ML) TOPICAL
Status: DISCONTINUED | OUTPATIENT
Start: 2020-03-18 | End: 2020-03-21 | Stop reason: HOSPADM

## 2020-03-18 RX ORDER — CEFAZOLIN SODIUM 2 G/100ML
2 INJECTION, SOLUTION INTRAVENOUS ONCE
Status: COMPLETED | OUTPATIENT
Start: 2020-03-18 | End: 2020-03-18

## 2020-03-18 RX ORDER — SODIUM CHLORIDE, SODIUM LACTATE, POTASSIUM CHLORIDE, CALCIUM CHLORIDE 600; 310; 30; 20 MG/100ML; MG/100ML; MG/100ML; MG/100ML
125 INJECTION, SOLUTION INTRAVENOUS CONTINUOUS
Status: DISCONTINUED | OUTPATIENT
Start: 2020-03-18 | End: 2020-03-18

## 2020-03-18 RX ORDER — SODIUM CHLORIDE 0.9 % (FLUSH) 0.9 %
3 SYRINGE (ML) INJECTION EVERY 12 HOURS SCHEDULED
Status: DISCONTINUED | OUTPATIENT
Start: 2020-03-18 | End: 2020-03-18

## 2020-03-18 RX ORDER — ACETAMINOPHEN 500 MG
1000 TABLET ORAL ONCE
Status: COMPLETED | OUTPATIENT
Start: 2020-03-18 | End: 2020-03-18

## 2020-03-18 RX ORDER — OXYTOCIN-SODIUM CHLORIDE 0.9% IV SOLN 30 UNIT/500ML 30-0.9/5 UT/ML-%
250 SOLUTION INTRAVENOUS CONTINUOUS
Status: DISPENSED | OUTPATIENT
Start: 2020-03-18 | End: 2020-03-18

## 2020-03-18 RX ORDER — NALBUPHINE HCL 10 MG/ML
2.5 AMPUL (ML) INJECTION EVERY 4 HOURS PRN
Status: DISPENSED | OUTPATIENT
Start: 2020-03-18 | End: 2020-03-19

## 2020-03-18 RX ORDER — MISOPROSTOL 200 UG/1
800 TABLET ORAL AS NEEDED
Status: DISCONTINUED | OUTPATIENT
Start: 2020-03-18 | End: 2020-03-21 | Stop reason: HOSPADM

## 2020-03-18 RX ORDER — ONDANSETRON 2 MG/ML
4 INJECTION INTRAMUSCULAR; INTRAVENOUS ONCE AS NEEDED
Status: COMPLETED | OUTPATIENT
Start: 2020-03-18 | End: 2020-03-18

## 2020-03-18 RX ORDER — FAMOTIDINE 10 MG/ML
20 INJECTION, SOLUTION INTRAVENOUS ONCE AS NEEDED
Status: COMPLETED | OUTPATIENT
Start: 2020-03-18 | End: 2020-03-18

## 2020-03-18 RX ORDER — EPHEDRINE SULFATE 50 MG/ML
INJECTION, SOLUTION INTRAVENOUS AS NEEDED
Status: DISCONTINUED | OUTPATIENT
Start: 2020-03-18 | End: 2020-03-18 | Stop reason: SURG

## 2020-03-18 RX ORDER — IBUPROFEN 600 MG/1
600 TABLET ORAL EVERY 8 HOURS PRN
Status: DISCONTINUED | OUTPATIENT
Start: 2020-03-18 | End: 2020-03-21 | Stop reason: HOSPADM

## 2020-03-18 RX ORDER — CARBOPROST TROMETHAMINE 250 UG/ML
250 INJECTION, SOLUTION INTRAMUSCULAR AS NEEDED
Status: DISCONTINUED | OUTPATIENT
Start: 2020-03-18 | End: 2020-03-21 | Stop reason: HOSPADM

## 2020-03-18 RX ORDER — OXYTOCIN-SODIUM CHLORIDE 0.9% IV SOLN 30 UNIT/500ML 30-0.9/5 UT/ML-%
250 SOLUTION INTRAVENOUS CONTINUOUS
Status: ACTIVE | OUTPATIENT
Start: 2020-03-18 | End: 2020-03-18

## 2020-03-18 RX ORDER — MORPHINE SULFATE 1 MG/ML
INJECTION, SOLUTION EPIDURAL; INTRATHECAL; INTRAVENOUS AS NEEDED
Status: DISCONTINUED | OUTPATIENT
Start: 2020-03-18 | End: 2020-03-18 | Stop reason: SURG

## 2020-03-18 RX ORDER — OXYTOCIN-SODIUM CHLORIDE 0.9% IV SOLN 30 UNIT/500ML 30-0.9/5 UT/ML-%
999 SOLUTION INTRAVENOUS ONCE
Status: COMPLETED | OUTPATIENT
Start: 2020-03-18 | End: 2020-03-18

## 2020-03-18 RX ORDER — DOCUSATE SODIUM 100 MG/1
100 CAPSULE, LIQUID FILLED ORAL 2 TIMES DAILY PRN
Status: DISCONTINUED | OUTPATIENT
Start: 2020-03-18 | End: 2020-03-21 | Stop reason: HOSPADM

## 2020-03-18 RX ORDER — OXYCODONE HYDROCHLORIDE AND ACETAMINOPHEN 5; 325 MG/1; MG/1
1 TABLET ORAL EVERY 4 HOURS PRN
Status: DISCONTINUED | OUTPATIENT
Start: 2020-03-18 | End: 2020-03-21 | Stop reason: HOSPADM

## 2020-03-18 RX ORDER — METHYLERGONOVINE MALEATE 0.2 MG/ML
200 INJECTION INTRAVENOUS ONCE AS NEEDED
Status: DISCONTINUED | OUTPATIENT
Start: 2020-03-18 | End: 2020-03-18 | Stop reason: HOSPADM

## 2020-03-18 RX ORDER — OXYTOCIN-SODIUM CHLORIDE 0.9% IV SOLN 30 UNIT/500ML 30-0.9/5 UT/ML-%
999 SOLUTION INTRAVENOUS ONCE
Status: DISCONTINUED | OUTPATIENT
Start: 2020-03-18 | End: 2020-03-21 | Stop reason: HOSPADM

## 2020-03-18 RX ORDER — DIPHENHYDRAMINE HYDROCHLORIDE 50 MG/ML
INJECTION INTRAMUSCULAR; INTRAVENOUS AS NEEDED
Status: DISCONTINUED | OUTPATIENT
Start: 2020-03-18 | End: 2020-03-18 | Stop reason: SURG

## 2020-03-18 RX ORDER — FENTANYL CITRATE 50 UG/ML
INJECTION, SOLUTION INTRAMUSCULAR; INTRAVENOUS AS NEEDED
Status: DISCONTINUED | OUTPATIENT
Start: 2020-03-18 | End: 2020-03-18 | Stop reason: SURG

## 2020-03-18 RX ORDER — OXYTOCIN-SODIUM CHLORIDE 0.9% IV SOLN 30 UNIT/500ML 30-0.9/5 UT/ML-%
125 SOLUTION INTRAVENOUS CONTINUOUS PRN
Status: COMPLETED | OUTPATIENT
Start: 2020-03-18 | End: 2020-03-18

## 2020-03-18 RX ADMIN — OXYCODONE HYDROCHLORIDE AND ACETAMINOPHEN 2 TABLET: 5; 325 TABLET ORAL at 23:38

## 2020-03-18 RX ADMIN — OXYTOCIN 250 ML/HR: 10 INJECTION, SOLUTION INTRAMUSCULAR; INTRAVENOUS at 18:45

## 2020-03-18 RX ADMIN — PHENYLEPHRINE HYDROCHLORIDE 100 MCG: 10 INJECTION INTRAVENOUS at 17:29

## 2020-03-18 RX ADMIN — PHENYLEPHRINE HYDROCHLORIDE 100 MCG: 10 INJECTION INTRAVENOUS at 17:26

## 2020-03-18 RX ADMIN — ACETAMINOPHEN 1000 MG: 500 TABLET, FILM COATED ORAL at 16:31

## 2020-03-18 RX ADMIN — FENTANYL CITRATE 15 MCG: 50 INJECTION INTRAMUSCULAR; INTRAVENOUS at 17:25

## 2020-03-18 RX ADMIN — SODIUM CHLORIDE, POTASSIUM CHLORIDE, SODIUM LACTATE AND CALCIUM CHLORIDE: 600; 310; 30; 20 INJECTION, SOLUTION INTRAVENOUS at 17:16

## 2020-03-18 RX ADMIN — OXYCODONE HYDROCHLORIDE AND ACETAMINOPHEN 1 TABLET: 5; 325 TABLET ORAL at 19:36

## 2020-03-18 RX ADMIN — PHENYLEPHRINE HYDROCHLORIDE 100 MCG: 10 INJECTION INTRAVENOUS at 17:50

## 2020-03-18 RX ADMIN — MORPHINE SULFATE 2 MG: 2 INJECTION, SOLUTION INTRAMUSCULAR; INTRAVENOUS at 22:43

## 2020-03-18 RX ADMIN — SODIUM CHLORIDE, POTASSIUM CHLORIDE, SODIUM LACTATE AND CALCIUM CHLORIDE 125 ML/HR: 600; 310; 30; 20 INJECTION, SOLUTION INTRAVENOUS at 18:45

## 2020-03-18 RX ADMIN — MORPHINE SULFATE 0.15 MG: 1 INJECTION, SOLUTION EPIDURAL; INTRATHECAL; INTRAVENOUS at 17:25

## 2020-03-18 RX ADMIN — SODIUM CHLORIDE, POTASSIUM CHLORIDE, SODIUM LACTATE AND CALCIUM CHLORIDE 1000 ML: 600; 310; 30; 20 INJECTION, SOLUTION INTRAVENOUS at 16:02

## 2020-03-18 RX ADMIN — DIPHENHYDRAMINE HYDROCHLORIDE 12.5 MG: 50 INJECTION INTRAMUSCULAR; INTRAVENOUS at 18:28

## 2020-03-18 RX ADMIN — OXYTOCIN 999 ML/HR: 10 INJECTION INTRAVENOUS at 17:48

## 2020-03-18 RX ADMIN — CEFAZOLIN SODIUM 2 G: 2 INJECTION, SOLUTION INTRAVENOUS at 16:42

## 2020-03-18 RX ADMIN — FAMOTIDINE 20 MG: 10 INJECTION, SOLUTION INTRAVENOUS at 16:35

## 2020-03-18 RX ADMIN — EPHEDRINE SULFATE 10 MG: 50 INJECTION INTRAVENOUS at 17:59

## 2020-03-18 RX ADMIN — IBUPROFEN 600 MG: 600 TABLET ORAL at 19:36

## 2020-03-18 RX ADMIN — ONDANSETRON 4 MG: 2 INJECTION INTRAMUSCULAR; INTRAVENOUS at 16:38

## 2020-03-18 RX ADMIN — PHENYLEPHRINE HYDROCHLORIDE 100 MCG: 10 INJECTION INTRAVENOUS at 17:40

## 2020-03-18 RX ADMIN — OXYTOCIN 125 ML/HR: 10 INJECTION, SOLUTION INTRAMUSCULAR; INTRAVENOUS at 19:38

## 2020-03-18 RX ADMIN — PHENYLEPHRINE HYDROCHLORIDE 100 MCG: 10 INJECTION INTRAVENOUS at 17:36

## 2020-03-18 NOTE — NURSING NOTE
Phone call to Lilly DAVALOSP. Informed of conversation RN had with Dr. Bermudez where consult with Dr. Diana was noted. NNP will be testing baby for syphilis titers and says confirmation of maternal titers will be requested by NICU. UDS is being sent on mom.  Volodymyr Paredes RN

## 2020-03-18 NOTE — NURSING NOTE
"Phone report to Dr. Bermudez regarding pt's syphilis titers. Lilly, NNP would like RPR titers drawn due to syphilis status. Dr. Bermudez pulled up consult from Dr. Diana from 2/20/20 noting no further testing was needed after pt rec'd her three doses, will let NNP know this.  Also notified that CPS case open, per pt, for \"truency with oldest son\". CPS worker said she wanted to see her in hospital after she had this babe. MD placing social work consult. UDS also to be sent.    Volodymyr Paredes RN    "

## 2020-03-18 NOTE — H&P
[facility]  Obstetric History and Physical    Chief Complaint   Patient presents with   • Scheduled      was seen in office prior to arrival, MD noted fetal decels, plan on repeat c/s. +FM, denies vaginal bleeding or LOF         Subjective     Patient is a 24 y.o. female  currently at 37w4d, who presents for repeat . Patient was evaluated in office today and growth suggested asymmetric IUGR and a fetal heart rate deceleration was noted during ultrasound. Patient was evaluated by her primary OB and delivery vis  delivery was recommended. Risks of procedure have been reviewed with patient to include bleeding, transfusion, infection, damage to internal organs, anesthetic complications, need for additional surgeries due to complications,DVT/PE and death. Patient had been considering  during her pregnancy but agrees with repeat  today in light of abnormal heart rate noted at office. Patient does not desire tubal sterilization at the time of her . Patient reports she has been having mild contractions but denies vaginal bleeding or leaking fluid. She notes normal fetal movement. She denies fever or cough or n/v.    Her prenatal care is complicated by .  Patient Active Problem List   Diagnosis   • Maternal care due to low transverse uterine scar from previous  delivery   • History of inadequate prenatal care   • Syphilis of mother during pregnancy   • History of  labor   • Tobacco use during pregnancy, antepartum   • Pregnancy   • Proteinuria affecting pregnancy in third trimester   • Hypokalemia   • History of postpartum hemorrhage, currently pregnant   • Patient desires vaginal birth after  section ()   • Iron deficiency   • Iron adverse reaction   • LGSIL on Pap smear of cervix   • Anemia affecting seventh pregnancy   • Pregnant   • Poor fetal growth affecting management of mother, antepartum    Her previous obstetric/gynecological history  is noted for prior  with last delivery for non-reassuring fetal status. She has also had 4 term vaginal deliveries with postpartum hemorrhage following her delivery in 2015.     The following portions of the patients history were reviewed and updated as appropriate: current medications, allergies, past medical history, past surgical history, past family history, past social history and problem list .       Prenatal Information:  Prenatal Results     POC Urine Glucose/Protein     Test Value Reference Range Date Time    Urine Glucose Negative mg/dL Negative, 1000 mg/dL (3+) 20 1134    Urine Protein Trace mg/dL Negative 20 1134          Initial Prenatal Labs     Test Value Reference Range Date Time    Hemoglobin        Hematocrit        Platelets 209 10*3/mm3 140 - 450 20 1413      220 10*3/mm3 140 - 450 20 0426      236 10*3/mm3 140 - 450 20 1848      273 x10E3/uL 150 - 450 20 1026    Rubella IgG 13.20 index Immune >0.99 20 1026    Hepatitis B SAg Negative  Negative 20 1026      Nonreactive  Nonreactive 20 1211    Hepatitis C Ab 0.1 s/co ratio 0.0 - 0.9 20 1026      Nonreactive  Nonreactive 20 1211    RPR Reactive  Non Reactive 20 1026    ABO B   20 1322    Rh Positive   20 1322    Antibody Screen        HIV Non Reactive  Non Reactive 20 1026      Nonreactive  Nonreactive 20 1211    Urine Culture Final report   20 1053    Gonorrhea Negative  Negative 20 1053    Chlamydia Negative  Negative 20 1053    TSH 1.110 uIU/mL 0.270 - 4.200 20 1026          2nd and 3rd Trimester     Test Value Reference Range Date Time    Hemoglobin (repeated) 9.8 g/dL 12.0 - 15.9 20 1413      7.2 g/dL 12.0 - 15.9 20 0426      7.1 g/dL 12.0 - 15.9 20 1848      8.3 g/dL 11.1 - 15.9 20 1026    Hematocrit (repeated) 34.0 % 34.0 - 46.6 20 1413      23.9 % 34.0 - 46.6 20 0426      24.2 %  34.0 - 46.6 02/19/20 1848      29.3 % 34.0 - 46.6 02/12/20 1026    GCT 86 mg/dL 74 - 180 02/20/20 0841      136 mg/dL 65 - 179 02/12/20 1026    Antibody Screen (repeated) Negative   02/20/20 1322      Negative  Negative 02/12/20 1026      Normal  Normal 02/12/20       Negative   01/23/20 1211    GTT Fasting        GTT 1 Hr        GTT 2 Hr        GTT 3 Hr        Group B Strep Negative  Negative 03/11/20 1634          Drug Screening     Test Value Reference Range Date Time    Amphetamine Screen Negative ng/mL Qtkixj=4700 02/12/20 1054      Negative (arb'U) Negative 01/23/20 1211    Barbiturate Screen Negative  Negative 02/20/20 2032      Negative ng/mL Qbiixg=770 02/12/20 1054    Benzodiazepine Screen Negative  Negative 02/20/20 2032      Negative ng/mL Gnwcet=558 02/12/20 1054    Methadone Screen Negative  Negative 02/20/20 2032      Negative ng/mL Vqvntx=611 02/12/20 1054    Phencyclidine Screen Negative ng/mL Cutoff=25 02/12/20 1054    Opiates Screen Negative  Negative 02/20/20 2032      Negative ng/mL Xtuggb=610 02/12/20 1054    THC Screen Negative  Negative 02/20/20 2032      Negative ng/mL Cutoff=50 02/12/20 1054      Negative  Negative 01/23/20 1211    Cocaine Screen Negative  Negative 02/20/20 2032      Negative ng/mL Txjmeb=906 02/12/20 1054      Negative  Negative 01/23/20 1211    Propoxyphene Screen Negative ng/mL Mqlrom=164 02/12/20 1054    Buprenorphine Screen        Methamphetamine Screen        Oxycodone Screen Negative  Negative 02/20/20 2032    Tricyclic Antidepressants Screen              Other (Risk screening)     Test Value Reference Range Date Time    Varicella IgG 3.2 AI  01/23/20 1211    Parvovirus IgG        CMV IgG        Cystic Fibrosis        Hemoglobin electrophoresis normal adult hemoglobin   02/19/20     NIPT        MSAFP-4        AFP (for NTD only)                  External Prenatal Results     Pregnancy Outside Results - Transcribed From Office Records - See Scanned Records For Details      Test Value Date Time    Hgb 9.8 g/dL 03/06/20 1413      7.2 g/dL 02/21/20 0426      7.1 g/dL 02/19/20 1848      8.3 g/dL 02/12/20 1026    Hct 34.0 % 03/06/20 1413      23.9 % 02/21/20 0426      24.2 % 02/19/20 1848      29.3 % 02/12/20 1026    ABO B  02/20/20 1322    Rh Positive  02/20/20 1322    Antibody Screen Negative  02/20/20 1322      Negative  02/12/20 1026      Normal  02/12/20       Negative  01/23/20 1211    Glucose Fasting GTT       Glucose Tolerance Test 1 hour       Glucose Tolerance Test 3 hour       Gonorrhea (discrete) Negative  02/12/20 1053    Chlamydia (discrete) Negative  02/12/20 1053    RPR Reactive  02/12/20 1026    VDRL       Syphilis Antibody Reactive  02/12/20 1026      >8.0 AI 01/23/20 1211    Rubella 13.20 index 02/12/20 1026    HBsAg Negative  02/12/20 1026      Nonreactive  01/23/20 1211    Herpes Simplex Virus PCR       Herpes Simplex VIrus Culture       HIV Non Reactive  02/12/20 1026      Nonreactive  01/23/20 1211    Hep C RNA Quant PCR       Hep C Antibody 0.1 s/co ratio 02/12/20 1026      Nonreactive  01/23/20 1211    AFP       Group B Strep Negative  03/11/20 1634    GBS Susceptibility to Clindamycin       GBS Susceptibility to Erythromycin       Fetal Fibronectin       Genetic Testing, Maternal Blood             Drug Screening     Test Value Date Time    Urine Drug Screen       Amphetamine Screen Negative ng/mL 02/12/20 1054      Negative (arb'U) 01/23/20 1211    Barbiturate Screen Negative  02/20/20 2032      Negative ng/mL 02/12/20 1054    Benzodiazepine Screen Negative  02/20/20 2032      Negative ng/mL 02/12/20 1054    Methadone Screen Negative  02/20/20 2032      Negative ng/mL 02/12/20 1054    Phencyclidine Screen Negative ng/mL 02/12/20 1054    Opiates Screen Negative  02/20/20 2032      Negative  01/23/20 1211    THC Screen Negative  02/20/20 2032    Cocaine Screen       Propoxyphene Screen Negative ng/mL 02/12/20 1054    Buprenorphine Screen       Methamphetamine  Screen       Oxycodone Screen Negative  20    Tricyclic Antidepressants Screen                    Past OB History:     OB History    Para Term  AB Living   7 5 4 1 1 5   SAB TAB Ectopic Molar Multiple Live Births   1 0 0 0 0 5      # Outcome Date GA Lbr Andrew/2nd Weight Sex Delivery Anes PTL Lv   7 Current            6  17 34w1d  1984 g (4 lb 6 oz) F CS-LTranv   RUTHY      Birth Comments: BPP 4/10       Complications: Abnormal  test   5 Term 11/08/15   3714 g (8 lb 3 oz) M Vag-Spont   RUTHY      Birth Comments: del note reviewed - [facility]       Complications: Postpartum hemorrhage   4 Term 14   3544 g (7 lb 13 oz) F Vag-Spont   RUTHY      Birth Comments: del note reviewed - no comps - JF    3 Term 13   3685 g (8 lb 2 oz) M Vag-Spont   RUTHY      Birth Comments: del note reviewed - no comps    2 Term 12   3742 g (8 lb 4 oz) M Vag-Vacuum   RUTHY      Birth Comments: del note reviewed - no comps - J[facility]    1 SAB 2009 16w0d             Birth Comments: required D&C for retained placenta       Past Medical History: Past Medical History:   Diagnosis Date   • History of anemia    • History of miscarriage    • History of transfusion     multple blood transfusions during pregnancy due to severe anemia   • History of urinary tract infection    • Migraine    • Syphilis 2019    FTA positive [facility]      Past Surgical History Past Surgical History:   Procedure Laterality Date   •  SECTION     • DILATATION AND CURETTAGE  2009    Miscarriage      Family History: History reviewed. No pertinent family history.   Social History:  reports that she quit smoking about 4 months ago. Her smoking use included cigars. She smoked 0.50 packs per day. She has never used smokeless tobacco.   reports that she does not drink alcohol.   reports that she does not use drugs.        General ROS: Pertinent items are noted in HPI, all other systems reviewed  and negative    Objective       Vital Signs Range for the last 24 hours  Temperature: Temp:  [99 °F (37.2 °C)] 99 °F (37.2 °C)   Temp Source: Temp src: Oral   BP: BP: (108)/(54) 108/54   Pulse:     Respirations: Resp:  [18] 18   SPO2:     O2 Amount (l/min):     O2 Devices     Weight: Weight:  [67.6 kg (149 lb)] 67.6 kg (149 lb)     Physical Examination: General appearance - alert, well appearing, and in no distress  Mental status - alert, oriented to person, place, and time  Neck - supple  Chest - clear to auscultation, no wheezes, rales or rhonchi, symmetric air entry  Heart - normal rate and regular rhythm  Abdomen - gravid, soft, nontender  Neurological - alert, oriented, normal speech, no focal findings or movement disorder noted  Extremities - bilateral lower extremities without cords, edema or tenderness  Skin - normal coloration and turgor    Presentation:    Cervix: Exam by:  primary OB in office   Dilation:  2 cm   Effacement:  20 %   Station: -3       Fetal Heart Rate Assessment   Reactive                               Uterine Assessment   Irritability noted with initial tracing, no current ctx noted                                      Laboratory Results:   Lab Results   Component Value Date    WBC 8.10 2020    HGB 10.4 (L) 2020    HCT 33.8 (L) 2020    MCV 71.8 (L) 2020     2020     Radiology Review: office u/s today: EFW at 11 % with AC at 7 %, BPP 8/8, fetal heart rate deceleration to the 50's documented by ultrasonographer during the scan      Assessment/Plan       Maternal care due to low transverse uterine scar from previous  delivery    History of inadequate prenatal care    Syphilis of mother during pregnancy    Tobacco use during pregnancy, antepartum    Pregnancy    History of postpartum hemorrhage, currently pregnant    Pregnant    Poor fetal growth affecting management of mother, antepartum        Assessment:  1.  Intrauterine pregnancy at 37w4d  gestation with abnormal fetal heart rate in office today. Current fetal heart tracing is overall reassuring    2.  Poor fetal growth noted with u/s evaluation today. Patient's primary OB has recommended delivery. Repeat  delivery recommended due to suspected growth restriction with fetal heart deceleration noted during office visit and unfavorable cervix. Patient agrees with this plan and understands surgical risks.   3.  Obstetrical history significant for previous  for non-reassuring fetal status, vaginal delivery X 4 and SAB. Patient has had postpartum hemorrhage complicated by blood transfusion following her last vaginal delivery.  4.  GBS status:   Strep Gp B NICK   Date Value Ref Range Status   2020 Negative Negative Final     Comment:     Centers for Disease Control and Prevention (CDC) and American Congress  of Obstetricians and Gynecologists (ACOG) guidelines for prevention of   group B streptococcal (GBS) disease specify co-collection of  a vaginal and rectal swab specimen to maximize sensitivity of GBS  detection. Per the CDC and ACOG, swabbing both the lower vagina and  rectum substantially increases the yield of detection compared with  sampling the vagina alone.  Penicillin G, ampicillin, or cefazolin are indicated for intrapartum  prophylaxis of  GBS colonization. Reflex susceptibility  testing should be performed prior to use of clindamycin only on GBS  isolates from penicillin-allergic women who are considered a high risk  for anaphylaxis. Treatment with vancomycin without additional testing  is warranted if resistance to clindamycin is noted.         Plan:  1. Proceed with  delivery   2. Plan of care has been reviewed with patient  3.  Risks, benefits of treatment plan have been discussed.  4.  All questions have been answered.        Bisi Bermudez MD  3/18/2020  17:03

## 2020-03-18 NOTE — ANESTHESIA PREPROCEDURE EVALUATION
Anesthesia Evaluation     no history of anesthetic complications:  NPO Solid Status: > 2 hours  NPO Liquid Status: Waived due to emergency           Airway   Mallampati: II  Dental - normal exam     Pulmonary    Cardiovascular         Neuro/Psych  (+) headaches,     GI/Hepatic/Renal/Endo      Musculoskeletal     Abdominal    Substance History      OB/GYN    (+) Pregnant,     Comment: 37wks 4days      Other                        Anesthesia Plan    ASA 2 - emergent     spinal       Anesthetic plan, all risks, benefits, and alternatives have been provided, discussed and informed consent has been obtained with: patient.

## 2020-03-18 NOTE — PROGRESS NOTES
Pregnancy at 37 4/7 wk gest  No c/o, good movement, occasional contractions, no leakage of fluid  ROS- all negative except some contrations  Exam-awake/alert in nad  Abd-soft gravid NT  extr- NT  Neuro- normal  Cervix- cervix long/tight 2 cm/-3 vtx, soft anterior  US wt 11% AC 7.3% [facility]=13.8 BPP 8/8, decel down to 50s documented during US  A/P pregnancy at 37 4/7 wk gest  1. Borderline asymmetric IUGR w/ decel- recommend delivery now, pt sent directly to L&D. Agrees to repeat C/S w/ PPS. Not a great candidate to  with IUGR and decel with cervix that is not very favorable. Discussed plan with patient and on call MD  2. Syphilis s/p treatment- ID consult stated pt treated adequately, recheck RPR 6 mo  3. GBS negative  4. Anemia- s/p IV iron x3 doses, hemoglobin has been improving  To L&D right now, pt will remain NPO

## 2020-03-18 NOTE — PLAN OF CARE
Problem: Patient Care Overview  Goal: Plan of Care Review  Outcome: Ongoing (interventions implemented as appropriate)  Flowsheets  Taken 3/18/2020 1934  Progress: improving  Outcome Summary: Pt admitted for repeat c/s d/t non-reassuring fetal status. Successful c/s, VSS. Pt in recovery until 2045.  Taken 3/18/2020 1705  Plan of Care Reviewed With: patient;sibling (sister)     Dee Dee Husain RN

## 2020-03-18 NOTE — OP NOTE
. SECTION FULL OPERATIVE REPORT  Pre-operative Diagnosis: 37 week intrauterine pregnancy, Non-reassuring fetal status due to fetal heart rate deceleration during ultrasound, Poor fetal growth, Previous  section  Post-operative Diagnosis: same and Nuchal cord  Procedure: Repeat Low Transverse  Section  Anesthesia: spinal  Surgeon(s): Bisi Bermudez MD  Assistant(s): Rito Peres MD  Infant: LV male infant, Apgars 8/8, weight 6lbs, 1.5oz  Findings: Normal uterus, tubes and ovaries; adhesions of omentum to anterior abdominal wall  Complications: none  Specimens: placenta  EBL: 638 mL      Description of Procedure:  The patient was taken to the operating room where anesthesia was found to be adequate. She was prepped and draped in the usual sterile fashion in the dorsal supine position with a leftward tilt. A surgical time-out was performed.  A Pfannenstiel skin incision was made with the scalpel and carried down to the underlying layer of fascia. The fascia was then incised in the midline and the incision was extended laterally with san scissors. The superior aspect of the fascia was then grasped with Kocher clamps and the underlying rectus muscles were then dissected off bluntly with the San scissors. The inferior aspect of the fascia was then grasped with Kocher clamps and dissected off similarly with San scissors. The rectus muscles were  and the peritoneum entered bluntly. The peritoneal incision was then carried superiorly and inferiorly taking care to identify the bladder at the lower aspect.   The bladder blade was inserted. The vesicouterine peritoneum was then identified and entered sharply with Metzenbaum scissors and a bladder flap was created. The bladder blade was reinserted and the uterine incision was made in a low transverse fashion using the scalpel. This was extended with the bandage scissors. Amniotic sac was entered and clear fluid was noted.  The bladder blade  was removed and the infant was delivered atraumatically. The umbilical cord was wrapped around the lower neck and shoulder. This was reduced following delivery of the fetal vertex. The nose and mouth were suctioned and the cord was clamped and cut after 30 seconds. The infant was handed to the  waiting staff. Cord blood was collected. The placenta was removed with gentle manual traction. The uterus was exteriorized and cleared of all clots and debris. The uterine incision was repaired in a running locking fashion with 0 vicryl suture. There was a small area of bleeding at the right incision that was made hemostatic with a figure-of-eight stitch of 0 vicryl. A small area of bleeding at the left incision was made hemostatic with a figure-of-eight stitch of 0 vicryl.  The uterus was examined and noted to be hemostatic and returned to the abdomen. Adhesions of the omentum were noted to the lower anterior abdominal wall. The lower aspect of the omental adhesion was clamped at its attachment to abdominal wall and sharply cut. Both pedicles were ligated with 0 vicryl suture and hemostasis was assured.  The posterior cul-de-sac and gutters were cleared of all clots and debris. The uterus was then re-inspected to ensure hemostasis as were all subfascial tissues. The fascia was re-approximated in a running fashion using 0-vicryl suture. The subcutaneous tissue was re-approximated in a running fashion with 3-0 vicryl. The skin was closed with 4-0 monocryl.    The patient tolerated the procedure well. Sponge, lap and needle counts were correct. The patient was taken to recovery in stable condition. Patient received antibiotic prophylaxis with Kefzol.     Bisi Bermudez MD  March 18, 2020

## 2020-03-18 NOTE — ANESTHESIA PROCEDURE NOTES
Intrathecal Block      Patient location during procedure: OR  Indication:procedure for pain  Performed By  Anesthesiologist: Markell Zuniga MD  Preanesthetic Checklist  Completed: patient identified, site marked, surgical consent, pre-op evaluation, timeout performed, IV checked, risks and benefits discussed and monitors and equipment checked  Intrathecal Block Prep:  Pt Position:sitting  Sterile Tech:sterile barrier, gloves, cap and mask  Prep:chloraprep  Monitoring:blood pressure monitoring, continuous pulse oximetry and EKG  Intrathecal Block Procedure:  Approach:midline  Guidance:palpation technique  Location:L3-L4  Needle Type:Remington  Needle Gauge:27 G  Placement of Needle Event: cerebrospinal fluid  Fluid Appearance:clear  Post Assessment:  Patient Tolerance:patient tolerated the procedure well with no apparent complications  Complications:no

## 2020-03-19 LAB
BASOPHILS # BLD AUTO: 0.04 10*3/MM3 (ref 0–0.2)
BASOPHILS NFR BLD AUTO: 0.4 % (ref 0–1.5)
BUPRENORPHINE UR QL: NEGATIVE NG/ML
DACRYOCYTES BLD QL SMEAR: NORMAL
EOSINOPHIL # BLD AUTO: 0.13 10*3/MM3 (ref 0–0.4)
EOSINOPHIL NFR BLD AUTO: 1.5 % (ref 0.3–6.2)
ERYTHROCYTE [DISTWIDTH] IN BLOOD BY AUTOMATED COUNT: ABNORMAL %
HCT VFR BLD AUTO: 30.5 % (ref 34–46.6)
HGB BLD-MCNC: 9.6 G/DL (ref 12–15.9)
HYPOCHROMIA BLD QL: NORMAL
LYMPHOCYTES # BLD AUTO: 1.95 10*3/MM3 (ref 0.7–3.1)
LYMPHOCYTES NFR BLD AUTO: 21.9 % (ref 19.6–45.3)
MCH RBC QN AUTO: 22.5 PG (ref 26.6–33)
MCHC RBC AUTO-ENTMCNC: 31.5 G/DL (ref 31.5–35.7)
MCV RBC AUTO: 71.4 FL (ref 79–97)
MONOCYTES # BLD AUTO: 0.55 10*3/MM3 (ref 0.1–0.9)
MONOCYTES NFR BLD AUTO: 6.2 % (ref 5–12)
NEUTROPHILS # BLD AUTO: 6.18 10*3/MM3 (ref 1.7–7)
NEUTROPHILS NFR BLD AUTO: 69.4 % (ref 42.7–76)
OVALOCYTES BLD QL SMEAR: NORMAL
PLAT MORPH BLD: NORMAL
PLATELET # BLD AUTO: 164 10*3/MM3 (ref 140–450)
PMV BLD AUTO: ABNORMAL FL
RBC # BLD AUTO: 4.27 10*6/MM3 (ref 3.77–5.28)
RPR SER QL: REACTIVE
RPR SER-TITR: ABNORMAL {TITER}
WBC MORPH BLD: NORMAL
WBC NRBC COR # BLD: 8.9 10*3/MM3 (ref 3.4–10.8)

## 2020-03-19 PROCEDURE — 63710000001 DIPHENHYDRAMINE PER 50 MG: Performed by: OBSTETRICS & GYNECOLOGY

## 2020-03-19 PROCEDURE — 85025 COMPLETE CBC W/AUTO DIFF WBC: CPT | Performed by: OBSTETRICS & GYNECOLOGY

## 2020-03-19 PROCEDURE — 86780 TREPONEMA PALLIDUM: CPT | Performed by: OBSTETRICS & GYNECOLOGY

## 2020-03-19 PROCEDURE — 86592 SYPHILIS TEST NON-TREP QUAL: CPT | Performed by: NURSE PRACTITIONER

## 2020-03-19 PROCEDURE — 86593 SYPHILIS TEST NON-TREP QUANT: CPT | Performed by: NURSE PRACTITIONER

## 2020-03-19 PROCEDURE — 86780 TREPONEMA PALLIDUM: CPT | Performed by: NURSE PRACTITIONER

## 2020-03-19 PROCEDURE — 25010000002 NALBUPHINE PER 10 MG: Performed by: ANESTHESIOLOGY

## 2020-03-19 PROCEDURE — 85007 BL SMEAR W/DIFF WBC COUNT: CPT | Performed by: OBSTETRICS & GYNECOLOGY

## 2020-03-19 RX ORDER — DIPHENHYDRAMINE HCL 25 MG
25 CAPSULE ORAL EVERY 6 HOURS PRN
Status: DISCONTINUED | OUTPATIENT
Start: 2020-03-19 | End: 2020-03-21 | Stop reason: HOSPADM

## 2020-03-19 RX ORDER — PRENATAL VIT NO.126/IRON/FOLIC 28MG-0.8MG
1 TABLET ORAL DAILY
Status: DISCONTINUED | OUTPATIENT
Start: 2020-03-20 | End: 2020-03-21 | Stop reason: HOSPADM

## 2020-03-19 RX ADMIN — OXYCODONE HYDROCHLORIDE AND ACETAMINOPHEN 2 TABLET: 5; 325 TABLET ORAL at 23:11

## 2020-03-19 RX ADMIN — NALBUPHINE HYDROCHLORIDE 2.5 MG: 10 INJECTION, SOLUTION INTRAMUSCULAR; INTRAVENOUS; SUBCUTANEOUS at 03:55

## 2020-03-19 RX ADMIN — DIPHENHYDRAMINE HYDROCHLORIDE 25 MG: 25 CAPSULE ORAL at 21:25

## 2020-03-19 RX ADMIN — OXYCODONE HYDROCHLORIDE AND ACETAMINOPHEN 2 TABLET: 5; 325 TABLET ORAL at 12:40

## 2020-03-19 RX ADMIN — IBUPROFEN 600 MG: 600 TABLET ORAL at 03:55

## 2020-03-19 RX ADMIN — OXYCODONE HYDROCHLORIDE AND ACETAMINOPHEN 2 TABLET: 5; 325 TABLET ORAL at 18:55

## 2020-03-19 RX ADMIN — IBUPROFEN 600 MG: 600 TABLET ORAL at 18:55

## 2020-03-19 RX ADMIN — OXYCODONE HYDROCHLORIDE AND ACETAMINOPHEN 2 TABLET: 5; 325 TABLET ORAL at 03:55

## 2020-03-19 RX ADMIN — OXYCODONE HYDROCHLORIDE AND ACETAMINOPHEN 2 TABLET: 5; 325 TABLET ORAL at 08:29

## 2020-03-19 NOTE — ANESTHESIA POSTPROCEDURE EVALUATION
"Patient: Chiquita Galvez    Procedure Summary     Date:  20 Room / Location:   [facility] /  [facility]    Anesthesia Start:   Anesthesia Stop:      Procedure:   SECTION REPEAT (N/A Abdomen) Diagnosis:      Surgeon:  Bisi Bermudez MD Provider:  Markell Zuniga MD    Anesthesia Type:  spinal ASA Status:  2 - Emergent          Anesthesia Type: spinal    Vitals  Vitals Value Taken Time   BP 98/66 3/18/2020  8:34 PM   Temp     Pulse 62 3/18/2020  8:34 PM   Resp 18 3/18/2020  8:19 PM   SpO2 100 % 3/18/2020  8:33 PM   Vitals shown include unvalidated device data.        Post Anesthesia Care and Evaluation    Patient location during evaluation: bedside  Patient participation: complete - patient participated  Level of consciousness: awake  Pain management: adequate  Airway patency: patent  Anesthetic complications: No anesthetic complications    Cardiovascular status: acceptable  Respiratory status: acceptable  Hydration status: acceptable    Comments: */68   Pulse 65   Temp 37.2 °C (99 °F) (Oral)   Resp 18   Ht 160 cm (63\")   Wt 67.6 kg (149 lb)   LMP 2019 (Exact Date)   SpO2 99%   Breastfeeding Unknown   BMI 26.39 kg/m²         "

## 2020-03-19 NOTE — PROGRESS NOTES
" POSTPARTUM ROUNDS    CC: POD 1 from CS    SUBJECTIVE:  Pain is poorly controlled. The patient is tolerating a regular diet.  No nausea or vomiting. She just got her catheter out and has not ambulated or voided yet.  Asking about pass to visit her baby at [facility].  Pt with flat affect. . Her lochia is normal.     ROS:  No leg pain, no HA, no N/V, no F/C, lochia normal    OBJECTIVE:  Vital Signs: BP 91/46 (BP Location: Right arm, Patient Position: Lying)   Pulse 73   Temp 98 °F (36.7 °C) (Oral)   Resp 16   Ht 160 cm (63\")   Wt 67.6 kg (149 lb)   LMP 2019 (Exact Date)   SpO2 97%   Breastfeeding Unknown   BMI 26.39 kg/m²     Intake/Output Summary (Last 24 hours) at 3/19/2020 0939  Last data filed at 3/19/2020 0930  Gross per 24 hour   Intake 1310 ml   Output 2688 ml   Net -1378 ml       Constitutional: The patient is well nourished., alert and oriented and no distress  Cardiovascular:RRR  Resp: nonlabored breathing  The incision is covered with clean, dry and Intact dressing  Abdomen: fundus firm below umbilicus, soft , ND, fundus non-tender  Extremities: trace  edema, non-tender bilateral    LABS / IMAGING:  Lab Results (last 24 hours)     Procedure Component Value Units Date/Time    RPR [[id number]]  (Abnormal) Collected:  20    Specimen:  Blood Updated:  20     RPR Reactive    RPR Titer [[id number]]  (Abnormal) Collected:  20    Specimen:  Blood Updated:  20     RPR Titer Pos 1:16    Treponema Pallidum, Confirmation [[id number]] Collected:  20    Specimen:  Blood Updated:  20    T Pallidum Antibody (FTA-Ab) [[id number]] Collected:  20    Specimen:  Blood Updated:  20    Scan Slide [[id number]] Collected:  20 0537    Specimen:  Blood Updated:  20 0659     Dacrocytes Slight/1+     Hypochromia Mod/2+     Ovalocytes Slight/1+     WBC Morphology Normal     Platelet Morphology Normal    CBC & Differential " [[id number]] Collected:  03/19/20 0537    Specimen:  Blood Updated:  03/19/20 0658    Narrative:       The following orders were created for panel order CBC & Differential.  Procedure                               Abnormality         Status                     ---------                               -----------         ------                     CBC Auto Differential[[id number]]        Abnormal            Final result                 Please view results for these tests on the individual orders.    CBC Auto Differential [[id number]]  (Abnormal) Collected:  03/19/20 0537    Specimen:  Blood Updated:  03/19/20 0658     WBC 8.90 10*3/mm3      RBC 4.27 10*6/mm3      Hemoglobin 9.6 g/dL      Hematocrit 30.5 %      MCV 71.4 fL      MCH 22.5 pg      MCHC 31.5 g/dL      RDW --     Comment: Unable to determine        MPV --     Platelets 164 10*3/mm3      Neutrophil % 69.4 %      Lymphocyte % 21.9 %      Monocyte % 6.2 %      Eosinophil % 1.5 %      Basophil % 0.4 %      Neutrophils, Absolute 6.18 10*3/mm3      Lymphocytes, Absolute 1.95 10*3/mm3      Monocytes, Absolute 0.55 10*3/mm3      Eosinophils, Absolute 0.13 10*3/mm3      Basophils, Absolute 0.04 10*3/mm3     URINE DRUG SCREEN PLUS BUPRENORPHINE - [[id number]] Collected:  03/18/20 1908     Updated:  03/18/20 1943    Narrative:       The following orders were created for panel order URINE DRUG SCREEN PLUS BUPRENORPHINE -.  Procedure                               Abnormality         Status                     ---------                               -----------         ------                     Urine Drug Screen - Urin...[[id number]]  Normal              Final result               Buprenorphine Screen Uri...[[id number]]                      In process                   Please view results for these tests on the individual orders.    Urine Drug Screen - Urine, Clean Catch [[id number]]  (Normal) Collected:  03/18/20 1908    Specimen:  Urine, Clean Catch Updated:  03/18/20 1943      Amphet/Methamphet, Screen Negative     Barbiturates Screen, Urine Negative     Benzodiazepine Screen, Urine Negative     Cocaine Screen, Urine Negative     Opiate Screen Negative     THC, Screen, Urine Negative     Methadone Screen, Urine Negative     Oxycodone Screen, Urine Negative    Narrative:       Negative Thresholds For Drugs Screened:     Amphetamines               500 ng/ml   Barbiturates               200 ng/ml   Benzodiazepines            100 ng/ml   Cocaine                    300 ng/ml   Methadone                  300 ng/ml   Opiates                    300 ng/ml   Oxycodone                  100 ng/ml   THC                        50 ng/ml    The Normal Value for all drugs tested is negative. This report includes final unconfirmed screening results to be used for medical treatment purposes only. Unconfirmed results must not be used for non-medical purposes such as employment or legal testing. Clinical consideration should be applied to any drug of abuse test, particulary when unconfirmed results are used.    Buprenorphine Screen Urine - Urine, Clean Catch [[id number]] Collected:  03/18/20 1908    Specimen:  Urine, Clean Catch Updated:  03/18/20 1913    CBC & Differential [[id number]] Collected:  03/18/20 1605    Specimen:  Blood Updated:  03/18/20 1642    Narrative:       The following orders were created for panel order CBC & Differential.  Procedure                               Abnormality         Status                     ---------                               -----------         ------                     CBC Auto Differential[[id number]]        Abnormal            Final result                 Please view results for these tests on the individual orders.    CBC Auto Differential [[id number]]  (Abnormal) Collected:  03/18/20 1605    Specimen:  Blood Updated:  03/18/20 1642     WBC 8.10 10*3/mm3      RBC 4.71 10*6/mm3      Hemoglobin 10.4 g/dL      Hematocrit 33.8 %      MCV 71.8 fL      MCH 22.1 pg       MCHC 30.8 g/dL      RDW 29.2 %      RDW-SD 69.5 fl      MPV --     Comment: .         Platelets 191 10*3/mm3      Neutrophil % 65.7 %      Lymphocyte % 25.2 %      Monocyte % 7.3 %      Eosinophil % 0.9 %      Basophil % 0.4 %      Neutrophils, Absolute 5.33 10*3/mm3      Lymphocytes, Absolute 2.04 10*3/mm3      Monocytes, Absolute 0.59 10*3/mm3      Eosinophils, Absolute 0.07 10*3/mm3      Basophils, Absolute 0.03 10*3/mm3           ASSESSMENT AND PLAN:    Patient Active Problem List   Diagnosis   • Maternal care due to low transverse uterine scar from previous  delivery   • History of inadequate prenatal care   • Syphilis of mother during pregnancy   • History of  labor   • Tobacco use during pregnancy, antepartum   • Pregnancy   • Proteinuria affecting pregnancy in third trimester   • Hypokalemia   • History of postpartum hemorrhage, currently pregnant   • Patient desires vaginal birth after  section ()   • Iron deficiency   • Iron adverse reaction   • LGSIL on Pap smear of cervix   • Anemia affecting seventh pregnancy   • Pregnant   • Poor fetal growth affecting management of mother, antepartum   • Non-reassuring fetal status, delivered, current hospitalization   •  delivery delivered       Patient is postop day 1 from LTCS  Patient is doing well    Plan:  Regular diet   Encourage ambulation   Consider pass to [place] if stable and pain control improves    Akosua De La Cruz MD    3/19/2020  09:39

## 2020-03-19 NOTE — NURSING NOTE
Spoke with Dr. De La Cruz regarding pt leaving on pass. May d/c IV and send pt to visit  at [facility].

## 2020-03-19 NOTE — PROGRESS NOTES
"Continued Stay Note  [facility]     Patient Name: Chiquita Galvez  MRN: [id number]  Today's Date: 3/19/2020    Admit Date: 3/18/2020    Discharge Plan     Row Name 03/19/20 1307       Plan    Plan  Mother to discharge home when medically ready. MARI Mayo    Plan Comments  Mother: Chiquita Galvez, MRN [id number]; Infant: Christoph Galvez, MRN [id number]. CSW was consulted for \"late PNC and open CPS case related to \"truency with oldest son\".\" Of note, mother's urine toxicology was negative on admission. No urine toxicology was obtained on infant. Cord toxicology was sent. CSW will follow for cord toxicology results and will report to CPS if warranted. Infant did transfer to [facility] for further treatment. Due to mother having active CPS involvement and concerns over insufficient prenatal care, a new CPS report was made via web reporting #[id number]. As mother is not ready for discharge today, CSW will follow up with mother on 3/20 for assessment and will follow up with CPS to see if report was accepted for investigation. MARI Mayo        Discharge Codes    No documentation.             CA Mayo    "

## 2020-03-20 ENCOUNTER — APPOINTMENT (OUTPATIENT)
Dept: LAB | Facility: HOSPITAL | Age: 25
End: 2020-03-20

## 2020-03-20 ENCOUNTER — APPOINTMENT (OUTPATIENT)
Dept: ONCOLOGY | Facility: HOSPITAL | Age: 25
End: 2020-03-20

## 2020-03-20 LAB
T PALLIDUM AB (FTA-AB): REACTIVE
T PALLIDUM IGG SER QL: REACTIVE

## 2020-03-20 PROCEDURE — 99024 POSTOP FOLLOW-UP VISIT: CPT | Performed by: OBSTETRICS & GYNECOLOGY

## 2020-03-20 RX ORDER — MEDROXYPROGESTERONE ACETATE 150 MG/ML
150 INJECTION, SUSPENSION INTRAMUSCULAR ONCE
Status: COMPLETED | OUTPATIENT
Start: 2020-03-20 | End: 2020-03-20

## 2020-03-20 RX ADMIN — OXYCODONE HYDROCHLORIDE AND ACETAMINOPHEN 2 TABLET: 5; 325 TABLET ORAL at 19:26

## 2020-03-20 RX ADMIN — OXYCODONE HYDROCHLORIDE AND ACETAMINOPHEN 2 TABLET: 5; 325 TABLET ORAL at 04:04

## 2020-03-20 RX ADMIN — MEDROXYPROGESTERONE ACETATE 150 MG: 150 INJECTION, SUSPENSION INTRAMUSCULAR at 19:25

## 2020-03-20 RX ADMIN — OXYCODONE HYDROCHLORIDE AND ACETAMINOPHEN 2 TABLET: 5; 325 TABLET ORAL at 08:21

## 2020-03-20 RX ADMIN — OXYCODONE HYDROCHLORIDE AND ACETAMINOPHEN 2 TABLET: 5; 325 TABLET ORAL at 13:54

## 2020-03-20 RX ADMIN — IBUPROFEN 600 MG: 600 TABLET ORAL at 13:54

## 2020-03-20 RX ADMIN — IBUPROFEN 600 MG: 600 TABLET ORAL at 04:04

## 2020-03-20 NOTE — PROGRESS NOTES
"Discharge Planning Assessment  [facility]     Patient Name: Chiquita Galvez  MRN: [id number]  Today's Date: 3/20/2020    Admit Date: 3/18/2020    Discharge Needs Assessment    No documentation.       Discharge Plan     Row Name 03/20/20 1004       Plan    Plan  Mother to discharge home when medically ready. Infant transferred to [facility]. CSW will follow for cord toxicology results. MARI Mayo    Plan Comments  Mother: Chiquita Galvez, MRN [id number]; Infant: Christoph Galvez, MRN [id number]. CSW was consulted for \"late PNC and open CPS case related to \"truency with oldest son\".\"  CPS  Domonique Snyder ([phone]) arrived to unit and met with CSW in regards to report made by CSW, web ID #[id number]. CPS worker Claudio advised mother does not have custody of her other children at this time. CSW advised CPS worker of cord toxicology results pending, CPS worker Claudio requests to be notified when results of cord toxicology arrive. CSW clarified with CHARLY Cerda that mother will likely discharge on 3/21. CSW also advised CPS worker that infant was transferred to [facility] and CPS worker will need to follow up with NICU CSW at [facility] regarding disposition of child. CPS worker met with mother in room to complete assessment. Due to CPS meeting with mother and their involvement with disposition of infant at [facility], CSW did not meet with mother. CSW will continue to follow for cord toxicology results and will report to CPS worker. MARI Mayo        Destination      Coordination has not been started for this encounter.      Durable Medical Equipment      Coordination has not been started for this encounter.      Dialysis/Infusion      Coordination has not been started for this encounter.      Home Medical Care      Coordination has not been started for this encounter.      Therapy      Coordination has not been started for this encounter.      Community Resources      Coordination has not been " "started for this encounter.          Demographic Summary     Row Name 03/20/20 1003       General Information    Admission Type  inpatient    Arrived From  emergency department    Referral Source  nursing    Reason for Consult  substance use concerns;psychosocial concerns    General Information Comments  late PNC and open CPS case related to \"truency with oldest son\"        Functional Status    No documentation.       Psychosocial    No documentation.       Abuse/Neglect    No documentation.       Legal    No documentation.       Substance Abuse    No documentation.       Patient Forms    No documentation.           CA Mayo    "

## 2020-03-20 NOTE — PLAN OF CARE
Problem: Patient Care Overview  Goal: Plan of Care Review  Outcome: Ongoing (interventions implemented as appropriate)  Flowsheets (Taken 3/20/2020 1215)  Plan of Care Reviewed With: patient  Outcome Summary: CPS meet with patient today.  Will d/c on saturday. taking po meds for pain control. baby at [facility]

## 2020-03-20 NOTE — PROGRESS NOTES
[facility]   PROGRESS NOTE    Post-Op Day 2 S/P   Subjective     Patient reports:  Pain is well controlled with prescription NSAID's including motrin and narcotic analgesics including percocet.  She is   Ambulating but in room only. Tolerating diet. Tolerating po -- normal.  Intake -- c/o of tolerating po solids.   Voiding - without difficulty; flatus reported..  Vaginal bleeding is light. Baby stable at [facility], now off O2 (transferred there due to no NICU beds and required O2)      Objective      Vitals: Vital Signs Range for the last 24 hours  Temperature: Temp:  [98.2 °F (36.8 °C)-98.9 °F (37.2 °C)] 98.2 °F (36.8 °C)   Temp Source: Temp src: Oral   BP: BP: ()/(58-61) 96/58   Pulse: Heart Rate:  [77-98] 77   Respirations: Resp:  [16] 16   SPO2:     O2 Amount (l/min):     O2 Devices     Weight:              Physical Exam    Lungs Respirations unlabored   Abdomen Soft NT ND, fundus-firm NT below umbilicus   Incision  healing well, no drainage, no erythema, no swelling, well approximated   Extremities extremities normal, atraumatic, no cyanosis or edema     I reviewed the patient's new clinical results.    Assessment/Plan        Maternal care due to low transverse uterine scar from previous  delivery    History of inadequate prenatal care    Syphilis of mother during pregnancy    Tobacco use during pregnancy, antepartum    Pregnancy    History of postpartum hemorrhage, currently pregnant    Pregnant    Poor fetal growth affecting management of mother, antepartum    Non-reassuring fetal status, delivered, current hospitalization     delivery delivered      Assessment:    Chiquita Galvez is Day 2  post-partum  , Low Transverse    .       Plan:  continue post op care and encouraged ambulation in halls TID   Recommend depo provera 150 mg injection today (first dose in hospital as pt has been non-compliant with care). Pt declined sterilization and desires depo provera for  contraception  Plans [facility] tomorrow. CPS following.      Dora Mckeon MD  3/20/2020  14:33

## 2020-03-21 VITALS
WEIGHT: 149 LBS | HEART RATE: 84 BPM | DIASTOLIC BLOOD PRESSURE: 64 MMHG | SYSTOLIC BLOOD PRESSURE: 106 MMHG | OXYGEN SATURATION: 97 % | HEIGHT: 63 IN | BODY MASS INDEX: 26.4 KG/M2 | TEMPERATURE: 97.1 F | RESPIRATION RATE: 18 BRPM

## 2020-03-21 PROCEDURE — 90791 PSYCH DIAGNOSTIC EVALUATION: CPT

## 2020-03-21 RX ORDER — IBUPROFEN 600 MG/1
600 TABLET ORAL EVERY 8 HOURS PRN
Qty: 60 TABLET | Refills: 1 | Status: SHIPPED | OUTPATIENT
Start: 2020-03-21 | End: 2021-10-16

## 2020-03-21 RX ORDER — FERROUS SULFATE 325(65) MG
325 TABLET ORAL
Qty: 90 TABLET | Refills: 3 | Status: SHIPPED | OUTPATIENT
Start: 2020-03-21 | End: 2021-10-16

## 2020-03-21 RX ORDER — OXYCODONE HYDROCHLORIDE AND ACETAMINOPHEN 5; 325 MG/1; MG/1
1-2 TABLET ORAL EVERY 4 HOURS PRN
Qty: 30 TABLET | Refills: 0 | Status: SHIPPED | OUTPATIENT
Start: 2020-03-21 | End: 2020-03-28

## 2020-03-21 RX ADMIN — OXYCODONE HYDROCHLORIDE AND ACETAMINOPHEN 2 TABLET: 5; 325 TABLET ORAL at 05:11

## 2020-03-21 RX ADMIN — OXYCODONE HYDROCHLORIDE AND ACETAMINOPHEN 2 TABLET: 5; 325 TABLET ORAL at 00:01

## 2020-03-21 RX ADMIN — OXYCODONE HYDROCHLORIDE AND ACETAMINOPHEN 2 TABLET: 5; 325 TABLET ORAL at 09:37

## 2020-03-21 RX ADMIN — IBUPROFEN 600 MG: 600 TABLET ORAL at 00:01

## 2020-03-21 RX ADMIN — OXYCODONE HYDROCHLORIDE AND ACETAMINOPHEN 2 TABLET: 5; 325 TABLET ORAL at 13:22

## 2020-03-21 RX ADMIN — IBUPROFEN 600 MG: 600 TABLET ORAL at 09:37

## 2020-03-21 NOTE — PLAN OF CARE
Problem: Patient Care Overview  Goal: Plan of Care Review  Outcome: Ongoing (interventions implemented as appropriate)  Flowsheets  Taken 3/18/2020 1934 by Dee Dee Husain RN  Progress: improving  Taken 3/20/2020 1215 by Zaida Hartman RN  Plan of Care Reviewed With: patient  Note:   Doing well, passing gas, pain well controlled w po meds, appears to be coping well with infant at [facility], plans to dc 3/21, ambulation encouraged   Goal: Individualization and Mutuality  Outcome: Ongoing (interventions implemented as appropriate)  Goal: Discharge Needs Assessment  Outcome: Ongoing (interventions implemented as appropriate)  Goal: Interprofessional Rounds/Family Conf  Outcome: Ongoing (interventions implemented as appropriate)     Problem: Fall Risk,  (Adult,Obstetrics,Pediatric)  Goal: Identify Related Risk Factors and Signs and Symptoms  Outcome: Ongoing (interventions implemented as appropriate)  Goal: Absence of Maternal Fall  Outcome: Ongoing (interventions implemented as appropriate)  Goal: Absence of  Fall/Drop  Outcome: Ongoing (interventions implemented as appropriate)     Problem: Skin Injury Risk (Adult)  Goal: Identify Related Risk Factors and Signs and Symptoms  Outcome: Ongoing (interventions implemented as appropriate)  Goal: Skin Health and Integrity  Outcome: Ongoing (interventions implemented as appropriate)     Problem: Anesthesia/Analgesia, Neuraxial (Obstetrics)  Goal: Signs and Symptoms of Listed Potential Problems Will be Absent, Minimized or Managed (Anesthesia/Analgesia, Neuraxial)  Outcome: Ongoing (interventions implemented as appropriate)     Problem: Postpartum ( Delivery) (Adult,Obstetrics,Pediatric)  Goal: Signs and Symptoms of Listed Potential Problems Will be Absent, Minimized or Managed (Postpartum)  Outcome: Ongoing (interventions implemented as appropriate)

## 2020-03-21 NOTE — LACTATION NOTE
RN reported pt wanted to start pumping. Pt was on phone when visited and told her to call lactation [phone] when available.

## 2020-03-21 NOTE — CONSULTS
"[facility]. [facility] consulted regarding depression, RN has yet to receive [place] score.  Upon entering room, patient leaving restroom.  Introduced self and explained role, she was agreeable to consult.  She is alert and oriented x 4.  Affect flat, quiet, little eye contact, did not provide much information, appeared bothered/bored with conversation.        She is a 23 yo single, -American female.  She reports that she lives alone with her sister, but later stated her children live with them.  According to chart and RN, patient has no custody of any children and has active CPS case.  She stated ages of her children as 7, 6, 5, 4, 3, and NB, who is currently at [facility] for continued care.  She is employed through [employer].  Enjoys moves, eating out, and activities with her children.  She cites her mom and sister has support.    She denies current feeling of depression and denies history of depression as well.  She denies current/past SI and attempts.  She denies hx IP psych admission.  She denies having mental health provider, but did see a therapist last year following the death of her other children's father.  She denies HI.  She denies hx of PPD and hx of taking any psych medications.  She denies A/V hallucinations, denies anxiety.  She stated her sleep as \"good\" and appetite as \"normal\".  She smokes cigars occasionally.  She denied alcohol and illicit drug use.  UDS was negative.  [facility] report is clear.      She declined need outpatient psych resources, but was open to receiving some, which were provided.  She was also provided with information regarding PPD.  She complained of pain and requested pain medication, RN informed.       will sign off as patient to be discharged, call if any questions.         "

## 2020-03-21 NOTE — PROGRESS NOTES
3/21/2020    Name:Chiquita Galvez    MR#:[id number]     Progress Note:  Post-Op day 3 S/P    HD:3    Subjective   24 y.o. yo Female  s/p CS at 37w4d doing well. Pain well controlled. Tolerating regular diet and having flatus. Lochia normal.   Baby is in NICU at [place], desires dc to go be with him.    Patient Active Problem List   Diagnosis   • Maternal care due to low transverse uterine scar from previous  delivery   • History of inadequate prenatal care   • Syphilis of mother during pregnancy   • History of  labor   • Tobacco use during pregnancy, antepartum   • Pregnancy   • Proteinuria affecting pregnancy in third trimester   • Hypokalemia   • History of postpartum hemorrhage, currently pregnant   • Patient desires vaginal birth after  section ()   • Iron deficiency   • Iron adverse reaction   • LGSIL on Pap smear of cervix   • Anemia affecting seventh pregnancy   • Pregnant   • Poor fetal growth affecting management of mother, antepartum   • Non-reassuring fetal status, delivered, current hospitalization   •  delivery delivered        Objective    Vitals  Temp:  Temp:  [97.1 °F (36.2 °C)-98.7 °F (37.1 °C)] 97.1 °F (36.2 °C)  Temp src: Oral  BP:  BP: (102-106)/(64-68) 106/64  Pulse:  Heart Rate:  [76-88] 84  RR:   Resp:  [16-18] 18  Weight: 67.6 kg (149 lb)  BMI:  Body mass index is 26.39 kg/m².      General Awake, alert, no distress  Abdomen Soft, non-distended, fundus firm, 2 cm below umbilicus, appropriately tender  Incision  Intact, no erythema or exudate  Extremities Calves NT bilaterally         I/O last 3 completed shifts:  In: 1040 [P.O.:1040]  Out: 1900 [Urine:1900]    LABS:   Lab Results   Component Value Date    WBC 8.90 2020    HGB 9.6 (L) 2020    HCT 30.5 (L) 2020    MCV 71.4 (L) 2020     2020       Infant: male       Assessment   1.  POD 3  2. Access consult due to prior concern for flat affect, has  significant psychosocial stressors as well  3. Significant anemia, continue oral iron    Plan: Doing well. Dc home today      Maternal care due to low transverse uterine scar from previous  delivery    History of inadequate prenatal care    Syphilis of mother during pregnancy    Tobacco use during pregnancy, antepartum    Pregnancy    History of postpartum hemorrhage, currently pregnant    Pregnant    Poor fetal growth affecting management of mother, antepartum    Non-reassuring fetal status, delivered, current hospitalization     delivery delivered      Telma Pisano MD  3/21/2020 11:05

## 2020-03-23 NOTE — DISCHARGE SUMMARY
section Discharge Summary    Date of Admission: 3/18/2020  Date of Discharge:  3/23/2020      Patient: Chiquita Galvez      MR#:[id number]    Surgeon/OB: Bisi Bermudez MD    Discharge Diagnosis:  section at 37w4d due to concern for intrauterine growth restriction and fetal heart rate deceleration, uncomplicated recovery    Procedures:  , Low Transverse     3/18/2020    5:45 PM      Anesthesia:  Spinal     Presenting Problem/History of Present Illness  Pregnant [Z34.90]  Pregnancy [Z34.90]     Patient Active Problem List   Diagnosis   • Maternal care due to low transverse uterine scar from previous  delivery   • History of inadequate prenatal care   • Syphilis of mother during pregnancy   • History of  labor   • Tobacco use during pregnancy, antepartum   • Pregnancy   • Proteinuria affecting pregnancy in third trimester   • Hypokalemia   • History of postpartum hemorrhage, currently pregnant   • Patient desires vaginal birth after  section ()   • Iron deficiency   • Iron adverse reaction   • LGSIL on Pap smear of cervix   • Anemia affecting seventh pregnancy   • Pregnant   • Poor fetal growth affecting management of mother, antepartum   • Non-reassuring fetal status, delivered, current hospitalization   •  delivery delivered       Hospital Course  Patient is a 24 y.o. female  at 37w4d status post  section with uneventful postoperative recovery. She had presented from the office where she was noted to have asymmetric intrauterine growth restriction and fetal heart rate deceleration on monitoring. Patient was advanced to regular diet on postoperative day#1.  On discharge, ambulating, tolerating a regular diet without any difficulties and her incision is dry, clean and intact.     She was treated for syphilis during the pregnancy    Infant:   male  fetus 2765 g (6 lb 1.5 oz)  with Apgar scores of 8  , 8   at five minutes.    Condition  on Discharge:  Stable    Vital Signs       Lab Results   Component Value Date    WBC 8.90 03/19/2020    HGB 9.6 (L) 03/19/2020    HCT 30.5 (L) 03/19/2020    MCV 71.4 (L) 03/19/2020     03/19/2020       Discharge Disposition  Home or Self Care    Discharge Medications     Discharge Medications      New Medications      Instructions Start Date   ferrous sulfate 325 (65 FE) MG tablet   325 mg, Oral, Daily With Breakfast      ibuprofen 600 MG tablet  Commonly known as:  ADVIL,MOTRIN   600 mg, Oral, Every 8 Hours PRN      oxyCODONE-acetaminophen 5-325 MG per tablet  Commonly known as:  PERCOCET   1-2 tablets, Oral, Every 4 Hours PRN         Continue These Medications      Instructions Start Date   Prenatal 1 30-0.975-200 MG capsule   1 tablet, Oral, Daily             Discharge Diet: Regular    Follow-up Appointments  Future Appointments   Date Time Provider Department Center   4/22/2020  9:30 AM LAB CHAIR [facility] LAB [facility]   4/22/2020 10:00 AM Roxie Max MD [facility]     Additional Instructions for the Follow-ups that You Need to Schedule     Call MD With Problems / Concerns   As directed      If you have newly increased incisional pain, drainage or bleeding from the incision, skin redness around the incision  If you have heavy bleeding, soaking through a pad per hour or passing large clots  If you have persistent headache, visual changes, upper abdominal pain  If you have persistent nausea, vomiting  If your pain is severe and uncontrollable    Order Comments:  If you have newly increased incisional pain, drainage or bleeding from the incision, skin redness around the incision If you have heavy bleeding, soaking through a pad per hour or passing large clots If you have persistent headache, visual changes, upper abdominal pain If you have persistent nausea, vomiting If your pain is severe and uncontrollable          Discharge Follow-up with Specified Provider: follow up with your  primary OB provider in two weeks and in six weeks; 2 Weeks   As directed      To:  follow up with your primary OB provider in two weeks and in six weeks    Follow Up:  2 Weeks    Follow Up Details:  Call the office or follow up sooner if you are having any problems               Prenatal labs/vax: b+. RPR POS and treated, rubella immune, VZ Ab +, s/p tdap    Telma Pisano MD  3/23/2020  09:06

## 2020-03-23 NOTE — PROGRESS NOTES
Continued Stay Note  [facility]     Patient Name: Chiquita Galvez  MRN: [id number]  Today's Date: 3/23/2020    Admit Date: 3/18/2020    Discharge Plan     Row Name 03/23/20 1637       Plan    Plan Comments  Mother: Chiquita Galvez, MRN [id number]; Infant: Brock Galvez, MRN [id number].. CSW reviewed cord toxicology results, which are negative. CSW notified mother's CPS worker Domonique Snyder of negative cord toxicology results via email. New Referral to CPS is not warranted at this time. No other issues noted. MARI Mayo        Discharge Codes    No documentation.       Expected Discharge Date and Time     Expected Discharge Date Expected Discharge Time    Mar 21, 2020             CA Mayo

## 2021-09-24 ENCOUNTER — TELEPHONE (OUTPATIENT)
Dept: OBSTETRICS AND GYNECOLOGY | Age: 26
End: 2021-09-24

## 2021-09-24 RX ORDER — ONDANSETRON 4 MG/1
4 TABLET, FILM COATED ORAL DAILY PRN
Qty: 30 TABLET | Refills: 1 | Status: SHIPPED | OUTPATIENT
Start: 2021-09-24 | End: 2021-09-24 | Stop reason: SDUPTHER

## 2021-09-24 RX ORDER — PROMETHAZINE HYDROCHLORIDE 12.5 MG/1
12.5 TABLET ORAL EVERY 6 HOURS PRN
Qty: 20 TABLET | Refills: 1 | Status: SHIPPED | OUTPATIENT
Start: 2021-09-24 | End: 2021-11-11

## 2021-09-24 RX ORDER — ONDANSETRON 4 MG/1
4 TABLET, FILM COATED ORAL EVERY 8 HOURS PRN
Qty: 30 TABLET | Refills: 1 | Status: SHIPPED | OUTPATIENT
Start: 2021-09-24 | End: 2021-10-14 | Stop reason: SDUPTHER

## 2021-09-25 NOTE — TELEPHONE ENCOUNTER
Called ER line due to early pregnancy with N/V, cant keep down liquids  I sent in zofran and phenergan and discussed how to take.    If no relief could present for IVF at ER

## 2021-10-14 RX ORDER — ONDANSETRON 4 MG/1
4 TABLET, FILM COATED ORAL EVERY 6 HOURS PRN
Qty: 30 TABLET | Refills: 1 | Status: SHIPPED | OUTPATIENT
Start: 2021-10-14 | End: 2021-10-20 | Stop reason: SDUPTHER

## 2021-10-16 ENCOUNTER — APPOINTMENT (OUTPATIENT)
Dept: ULTRASOUND IMAGING | Facility: HOSPITAL | Age: 26
End: 2021-10-16

## 2021-10-16 ENCOUNTER — HOSPITAL ENCOUNTER (EMERGENCY)
Facility: HOSPITAL | Age: 26
Discharge: HOME | End: 2021-10-17
Attending: EMERGENCY MEDICINE | Admitting: EMERGENCY MEDICINE

## 2021-10-16 VITALS
TEMPERATURE: 98 F | OXYGEN SATURATION: 100 % | SYSTOLIC BLOOD PRESSURE: 105 MMHG | RESPIRATION RATE: 16 BRPM | BODY MASS INDEX: 23.74 KG/M2 | DIASTOLIC BLOOD PRESSURE: 67 MMHG | HEART RATE: 65 BPM | HEIGHT: 63 IN | WEIGHT: 134 LBS

## 2021-10-16 DIAGNOSIS — E87.6 HYPOKALEMIA: ICD-10-CM

## 2021-10-16 DIAGNOSIS — O21.0 HYPEREMESIS GRAVIDARUM, MILD, BEFORE 23RD WEEK: Primary | ICD-10-CM

## 2021-10-16 LAB
ABO GROUP BLD: NORMAL
ALBUMIN SERPL-MCNC: 4.4 G/DL (ref 3.5–5.2)
ALBUMIN/GLOB SERPL: 1.3 G/DL
ALP SERPL-CCNC: 69 U/L (ref 39–117)
ALT SERPL W P-5'-P-CCNC: 11 U/L (ref 1–33)
AMORPH URATE CRY URNS QL MICRO: ABNORMAL /HPF
ANION GAP SERPL CALCULATED.3IONS-SCNC: 13.9 MMOL/L (ref 5–15)
AST SERPL-CCNC: 15 U/L (ref 1–32)
BACTERIA UR QL AUTO: ABNORMAL /HPF
BASOPHILS # BLD AUTO: 0.02 10*3/MM3 (ref 0–0.2)
BASOPHILS NFR BLD AUTO: 0.2 % (ref 0–1.5)
BILIRUB SERPL-MCNC: 0.3 MG/DL (ref 0–1.2)
BILIRUB UR QL STRIP: NEGATIVE
BLD GP AB SCN SERPL QL: NEGATIVE
BUN SERPL-MCNC: 4 MG/DL (ref 6–20)
BUN/CREAT SERPL: 7.1 (ref 7–25)
CALCIUM SPEC-SCNC: 9.2 MG/DL (ref 8.6–10.5)
CHLORIDE SERPL-SCNC: 100 MMOL/L (ref 98–107)
CLARITY UR: CLEAR
CLUE CELLS SPEC QL WET PREP: ABNORMAL
CO2 SERPL-SCNC: 21.1 MMOL/L (ref 22–29)
COLOR UR: YELLOW
CREAT SERPL-MCNC: 0.56 MG/DL (ref 0.57–1)
DEPRECATED RDW RBC AUTO: 40.6 FL (ref 37–54)
EOSINOPHIL # BLD AUTO: 0.02 10*3/MM3 (ref 0–0.4)
EOSINOPHIL NFR BLD AUTO: 0.2 % (ref 0.3–6.2)
ERYTHROCYTE [DISTWIDTH] IN BLOOD BY AUTOMATED COUNT: 14.7 % (ref 12.3–15.4)
GFR SERPL CREATININE-BSD FRML MDRD: >150 ML/MIN/1.73
GLOBULIN UR ELPH-MCNC: 3.3 GM/DL
GLUCOSE SERPL-MCNC: 89 MG/DL (ref 65–99)
GLUCOSE UR STRIP-MCNC: NEGATIVE MG/DL
HCG INTACT+B SERPL-ACNC: NORMAL MIU/ML
HCT VFR BLD AUTO: 40.1 % (ref 34–46.6)
HGB BLD-MCNC: 13.8 G/DL (ref 12–15.9)
HGB UR QL STRIP.AUTO: ABNORMAL
HYALINE CASTS UR QL AUTO: ABNORMAL /LPF
HYDATID CYST SPEC WET PREP: ABNORMAL
IMM GRANULOCYTES # BLD AUTO: 0.03 10*3/MM3 (ref 0–0.05)
IMM GRANULOCYTES NFR BLD AUTO: 0.3 % (ref 0–0.5)
KETONES UR QL STRIP: ABNORMAL
LEUKOCYTE ESTERASE UR QL STRIP.AUTO: ABNORMAL
LYMPHOCYTES # BLD AUTO: 1.67 10*3/MM3 (ref 0.7–3.1)
LYMPHOCYTES NFR BLD AUTO: 17.2 % (ref 19.6–45.3)
MCH RBC QN AUTO: 26.6 PG (ref 26.6–33)
MCHC RBC AUTO-ENTMCNC: 34.4 G/DL (ref 31.5–35.7)
MCV RBC AUTO: 77.4 FL (ref 79–97)
MONOCYTES # BLD AUTO: 0.65 10*3/MM3 (ref 0.1–0.9)
MONOCYTES NFR BLD AUTO: 6.7 % (ref 5–12)
MUCOUS THREADS URNS QL MICRO: ABNORMAL /HPF
NEUTROPHILS NFR BLD AUTO: 7.34 10*3/MM3 (ref 1.7–7)
NEUTROPHILS NFR BLD AUTO: 75.4 % (ref 42.7–76)
NITRITE UR QL STRIP: NEGATIVE
NRBC BLD AUTO-RTO: 0 /100 WBC (ref 0–0.2)
PH UR STRIP.AUTO: 5.5 [PH] (ref 5–8)
PLATELET # BLD AUTO: 286 10*3/MM3 (ref 140–450)
PMV BLD AUTO: 10.5 FL (ref 6–12)
POTASSIUM SERPL-SCNC: 3.4 MMOL/L (ref 3.5–5.2)
PROT SERPL-MCNC: 7.7 G/DL (ref 6–8.5)
PROT UR QL STRIP: ABNORMAL
RBC # BLD AUTO: 5.18 10*6/MM3 (ref 3.77–5.28)
RBC # UR: ABNORMAL /HPF
REF LAB TEST METHOD: ABNORMAL
RH BLD: POSITIVE
SODIUM SERPL-SCNC: 135 MMOL/L (ref 136–145)
SP GR UR STRIP: 1.02 (ref 1–1.03)
SQUAMOUS #/AREA URNS HPF: ABNORMAL /HPF
T VAGINALIS SPEC QL WET PREP: ABNORMAL
T&S EXPIRATION DATE: NORMAL
UROBILINOGEN UR QL STRIP: ABNORMAL
WBC # BLD AUTO: 9.73 10*3/MM3 (ref 3.4–10.8)
WBC SPEC QL WET PREP: ABNORMAL
WBC UR QL AUTO: ABNORMAL /HPF
YEAST GENITAL QL WET PREP: ABNORMAL

## 2021-10-16 PROCEDURE — 86850 RBC ANTIBODY SCREEN: CPT | Performed by: EMERGENCY MEDICINE

## 2021-10-16 PROCEDURE — 87491 CHLMYD TRACH DNA AMP PROBE: CPT | Performed by: EMERGENCY MEDICINE

## 2021-10-16 PROCEDURE — 87086 URINE CULTURE/COLONY COUNT: CPT | Performed by: EMERGENCY MEDICINE

## 2021-10-16 PROCEDURE — 25010000002 METOCLOPRAMIDE PER 10 MG: Performed by: EMERGENCY MEDICINE

## 2021-10-16 PROCEDURE — 80053 COMPREHEN METABOLIC PANEL: CPT | Performed by: EMERGENCY MEDICINE

## 2021-10-16 PROCEDURE — 81001 URINALYSIS AUTO W/SCOPE: CPT | Performed by: EMERGENCY MEDICINE

## 2021-10-16 PROCEDURE — 86901 BLOOD TYPING SEROLOGIC RH(D): CPT | Performed by: EMERGENCY MEDICINE

## 2021-10-16 PROCEDURE — 99284 EMERGENCY DEPT VISIT MOD MDM: CPT

## 2021-10-16 PROCEDURE — 96365 THER/PROPH/DIAG IV INF INIT: CPT

## 2021-10-16 PROCEDURE — 76815 OB US LIMITED FETUS(S): CPT

## 2021-10-16 PROCEDURE — 85025 COMPLETE CBC W/AUTO DIFF WBC: CPT | Performed by: EMERGENCY MEDICINE

## 2021-10-16 PROCEDURE — 93976 VASCULAR STUDY: CPT

## 2021-10-16 PROCEDURE — 87210 SMEAR WET MOUNT SALINE/INK: CPT | Performed by: EMERGENCY MEDICINE

## 2021-10-16 PROCEDURE — 25010000002 ONDANSETRON PER 1 MG: Performed by: EMERGENCY MEDICINE

## 2021-10-16 PROCEDURE — 87591 N.GONORRHOEAE DNA AMP PROB: CPT | Performed by: EMERGENCY MEDICINE

## 2021-10-16 PROCEDURE — 84702 CHORIONIC GONADOTROPIN TEST: CPT | Performed by: EMERGENCY MEDICINE

## 2021-10-16 PROCEDURE — 96375 TX/PRO/DX INJ NEW DRUG ADDON: CPT

## 2021-10-16 PROCEDURE — P9612 CATHETERIZE FOR URINE SPEC: HCPCS

## 2021-10-16 PROCEDURE — 96366 THER/PROPH/DIAG IV INF ADDON: CPT

## 2021-10-16 PROCEDURE — 86900 BLOOD TYPING SEROLOGIC ABO: CPT | Performed by: EMERGENCY MEDICINE

## 2021-10-16 RX ORDER — METOCLOPRAMIDE HYDROCHLORIDE 5 MG/ML
10 INJECTION INTRAMUSCULAR; INTRAVENOUS ONCE
Status: COMPLETED | OUTPATIENT
Start: 2021-10-16 | End: 2021-10-16

## 2021-10-16 RX ORDER — ACETAMINOPHEN 500 MG
500 TABLET ORAL EVERY 6 HOURS PRN
COMMUNITY

## 2021-10-16 RX ORDER — SODIUM CHLORIDE 0.9 % (FLUSH) 0.9 %
10 SYRINGE (ML) INJECTION AS NEEDED
Status: DISCONTINUED | OUTPATIENT
Start: 2021-10-16 | End: 2021-10-17 | Stop reason: HOSPADM

## 2021-10-16 RX ORDER — ACETAMINOPHEN 650 MG/1
650 SUPPOSITORY RECTAL ONCE
Status: DISCONTINUED | OUTPATIENT
Start: 2021-10-16 | End: 2021-10-16

## 2021-10-16 RX ORDER — DOXYLAMINE SUCCINATE AND PYRIDOXINE HYDROCHLORIDE, DELAYED RELEASE TABLETS 10 MG/10 MG 10; 10 MG/1; MG/1
2 TABLET, DELAYED RELEASE ORAL NIGHTLY PRN
Qty: 14 TABLET | Refills: 0 | Status: SHIPPED | OUTPATIENT
Start: 2021-10-16 | End: 2022-03-30 | Stop reason: HOSPADM

## 2021-10-16 RX ORDER — DEXTROSE, SODIUM CHLORIDE, AND POTASSIUM CHLORIDE 5; .45; .15 G/100ML; G/100ML; G/100ML
1000 INJECTION INTRAVENOUS
Status: DISCONTINUED | OUTPATIENT
Start: 2021-10-16 | End: 2021-10-17 | Stop reason: HOSPADM

## 2021-10-16 RX ORDER — ACETAMINOPHEN 500 MG
1000 TABLET ORAL ONCE
Status: COMPLETED | OUTPATIENT
Start: 2021-10-16 | End: 2021-10-16

## 2021-10-16 RX ORDER — ONDANSETRON 2 MG/ML
4 INJECTION INTRAMUSCULAR; INTRAVENOUS ONCE
Status: COMPLETED | OUTPATIENT
Start: 2021-10-16 | End: 2021-10-16

## 2021-10-16 RX ADMIN — POTASSIUM CHLORIDE, DEXTROSE MONOHYDRATE AND SODIUM CHLORIDE 1000 ML/HR: 150; 5; 450 INJECTION, SOLUTION INTRAVENOUS at 21:22

## 2021-10-16 RX ADMIN — ACETAMINOPHEN 1000 MG: 500 TABLET ORAL at 21:09

## 2021-10-16 RX ADMIN — ONDANSETRON 4 MG: 2 INJECTION INTRAMUSCULAR; INTRAVENOUS at 19:59

## 2021-10-16 RX ADMIN — SODIUM CHLORIDE 1000 ML: 9 INJECTION, SOLUTION INTRAVENOUS at 20:02

## 2021-10-16 RX ADMIN — METOCLOPRAMIDE HYDROCHLORIDE 10 MG: 5 INJECTION INTRAMUSCULAR; INTRAVENOUS at 23:22

## 2021-10-16 NOTE — ED NOTES
"Pt came to ED via PV for c/o bilateral lower back pain, vomiting, and vaginal bleeding. Pain described as stabbing that will \"ease up in the front\", cause cramping, and then move to the back again. Pain is 10/10, constant but radiates to anterior abdomen and pelvis. Moving, walking, laying in various positions increases pain. Yesterday evening pt said she threw up black emesis, which was unusual because is normally \"yellow and clear stuff\" for the past few days. Pt gets blood transfusions every 3-4 months but last one was 4 months after had son - June 2020. Pt was unable to get appointments for them with COVID. Pt can't get out of bed, has no energy and can't keep anything down. Pt has also been having headaches that are unrelieved by Tylenol, mainly in the front described as throbbing. Pt found out she was pregnant last month (10 weeks and 2 days) and since then pt can't \"even keep water down.\" SOA when laying on her back, so she has to \"sit up and catch my breath.\" Vaginal bleeding started 2 days ago, began a few days ago and was light flow/brown colored not every time went to restroom but yesterday and today she had mild pink discharge. 2 weeks ago was prescribed Zofran and promethazine but neither have relieved symptoms.      Farideh Owen, RN  10/16/21 7311    "

## 2021-10-16 NOTE — ED PROVIDER NOTES
EMERGENCY DEPARTMENT ENCOUNTER    CHIEF COMPLAINT  Chief Complaint: Vomiting  History given by: Patient  History limited by: Nothing  Room Number:   PMD: Provider, No Known      HPI:  Pt is a 26 y.o. female who is G8, P6, AB 1 at 8-0/7 weeks by her LNMP presents complaining of persistent vomiting for 3 weeks and bilateral lower abdominal pain, low back pain constant for 3 days cramping/sharp in nature similar to bad menstrual cramps.  Patient denies bleeding, however reports some pink-tinged tissue when she wipes after urinating.  Patient reports she had vomited she has vomited 6 times in the past 24 hours, ran out of Zofran last week and reports promethazine is not working for her at home.  Patient denies vomiting, chest pain, shortness of air, fever, cough, vaginal discharge, diarrhea, swelling of extremities.  Patient denies weakness, numbness, pain of lower extremities.  Patient denies any injury  Patient reports she has a history of anemia but denies history of high blood pressure, diabetes or other complications with her previous pregnancies other than anemia.  Patient reports she had  x2  Patient reports she is not taking prenatal vitamins.    Duration: 3 weeks of vomiting  Associated Symptoms: Nausea, lower abdominal/back cramping for 3 days  Aggravating Factors: Eating or drinking  Alleviating Factors: Zofran  Treatment before arrival: Ran out of Zofran    PAST MEDICAL HISTORY  Active Ambulatory Problems     Diagnosis Date Noted   • Maternal care due to low transverse uterine scar from previous  delivery 2020   • History of inadequate prenatal care 2020   • Syphilis of mother during pregnancy 2020   • History of  labor 2020   • Tobacco use during pregnancy, antepartum 2020   • Pregnancy 2020   • Proteinuria affecting pregnancy in third trimester 2020   • Hypokalemia 2020   • History of postpartum hemorrhage, currently pregnant  2020   • Patient desires vaginal birth after  section () 2020   • Iron deficiency 2020   • Iron adverse reaction 2020   • LGSIL on Pap smear of cervix 2020   • Anemia affecting seventh pregnancy 2020   • Pregnant 2020   • Poor fetal growth affecting management of mother, antepartum 2020   • Non-reassuring fetal status, delivered, current hospitalization 2020   •  delivery delivered 2020     Resolved Ambulatory Problems     Diagnosis Date Noted   • IUGR (intrauterine growth restriction) affecting care of mother, third trimester, fetus 1 2020     Past Medical History:   Diagnosis Date   • History of anemia    • History of miscarriage    • History of transfusion    • History of urinary tract infection    • Migraine    • Syphilis        PAST SURGICAL HISTORY  Past Surgical History:   Procedure Laterality Date   •  SECTION     •  SECTION N/A 3/18/2020    Procedure:  SECTION REPEAT;  Surgeon: Bisi Bermudez MD;  Location: [facility] LABOR DELIVERY;  Service: Obstetrics/Gynecology;  Laterality: N/A;   • DILATATION AND CURETTAGE      Miscarriage       FAMILY HISTORY  No family history on file.    SOCIAL HISTORY  Social History     Socioeconomic History   • Marital status:    • Number of children: 5   Tobacco Use   • Smoking status: Former Smoker     Packs/day: 0.50     Types: Cigars     Quit date: 2019     Years since quittin.9   • Smokeless tobacco: Never Used   Substance and Sexual Activity   • Alcohol use: Never   • Drug use: Never   • Sexual activity: Yes     Partners: Male     Birth control/protection: None       ALLERGIES  Patient has no known allergies.    REVIEW OF SYSTEMS  Review of Systems   Constitutional: Negative for chills and fever.   HENT: Negative for rhinorrhea and sore throat.    Eyes: Negative for visual disturbance.   Respiratory: Negative for cough and  shortness of breath.    Cardiovascular: Negative for chest pain, palpitations and leg swelling.   Gastrointestinal: Positive for abdominal pain, nausea and vomiting. Negative for diarrhea.   Endocrine: Negative.    Genitourinary: Negative for decreased urine volume, dysuria and frequency.   Musculoskeletal: Positive for back pain ( Low back). Negative for neck pain.   Skin: Negative for rash.   Neurological: Negative for syncope and headaches.   Psychiatric/Behavioral: Negative.    All other systems reviewed and are negative.      PHYSICAL EXAM  ED Triage Vitals   Temp Heart Rate Resp BP SpO2   10/16/21 1606 10/16/21 1606 10/16/21 1606 10/16/21 1712 10/16/21 1606   97.8 °F (36.6 °C) (!) 148 16 127/78 100 %      Temp src Heart Rate Source Patient Position BP Location FiO2 (%)   10/16/21 1606 10/16/21 1606 10/16/21 1712 10/16/21 1712 --   Tympanic Monitor Lying Right arm        Physical Exam  Vitals and nursing note reviewed. Exam conducted with a chaperone present.   Constitutional:       General: She is in acute distress (mild).      Appearance: She is not toxic-appearing.   HENT:      Head: Normocephalic and atraumatic.   Cardiovascular:      Rate and Rhythm: Normal rate and regular rhythm.      Pulses:           Posterior tibial pulses are 2+ on the right side and 2+ on the left side.      Heart sounds: Normal heart sounds. No murmur heard.      Pulmonary:      Effort: Pulmonary effort is normal. No respiratory distress.      Breath sounds: Normal breath sounds.   Abdominal:      General: Bowel sounds are normal.      Palpations: Abdomen is soft.      Tenderness: There is abdominal tenderness in the right lower quadrant, suprapubic area and left lower quadrant. There is no guarding or rebound.   Genitourinary:     General: Normal vulva.      Labia:         Right: No lesion.         Left: No lesion.       Vagina: No tenderness or bleeding.      Cervix: No cervical bleeding.      Adnexa:         Right: No mass,  tenderness or fullness.          Left: No mass, tenderness or fullness.     Musculoskeletal:         General: Normal range of motion.      Cervical back: Normal range of motion and neck supple.   Skin:     General: Skin is warm and dry.   Neurological:      Mental Status: She is alert and oriented to person, place, and time.   Psychiatric:         Mood and Affect: Affect normal.         LAB RESULTS  Lab Results (last 24 hours)     Procedure Component Value Units Date/Time    CBC & Differential [[id number]]  (Abnormal) Collected: 10/16/21 1709    Specimen: Blood Updated: 10/16/21 1730    Narrative:      The following orders were created for panel order CBC & Differential.  Procedure                               Abnormality         Status                     ---------                               -----------         ------                     CBC Auto Differential[[id number]]        Abnormal            Final result                 Please view results for these tests on the individual orders.    Comprehensive Metabolic Panel [[id number]]  (Abnormal) Collected: 10/16/21 1709    Specimen: Blood Updated: 10/16/21 1747     Glucose 89 mg/dL      BUN 4 mg/dL      Creatinine 0.56 mg/dL      Sodium 135 mmol/L      Potassium 3.4 mmol/L      Chloride 100 mmol/L      CO2 21.1 mmol/L      Calcium 9.2 mg/dL      Total Protein 7.7 g/dL      Albumin 4.40 g/dL      ALT (SGPT) 11 U/L      AST (SGOT) 15 U/L      Alkaline Phosphatase 69 U/L      Total Bilirubin 0.3 mg/dL      eGFR  African Amer >150 mL/min/1.73      Globulin 3.3 gm/dL      A/G Ratio 1.3 g/dL      BUN/Creatinine Ratio 7.1     Anion Gap 13.9 mmol/L     Narrative:      GFR Normal >60  Chronic Kidney Disease <60  Kidney Failure <15      hCG, Quantitative, Pregnancy [[id number]] Collected: 10/16/21 1709    Specimen: Blood Updated: 10/16/21 1757     HCG Quantitative 257,067.00 mIU/mL     Narrative:      HCG Ranges by Gestational Age    Females - non-pregnant premenopausal   </=  1mIU/mL HCG  Females - postmenopausal               </= 7mIU/mL HCG    3 Weeks       5.4   -      72 mIU/mL  4 Weeks      10.2   -     708 mIU/mL  5 Weeks       217   -   8,245 mIU/mL  6 Weeks       152   -  32,177 mIU/mL  7 Weeks     4,059   - 153,767 mIU/mL  8 Weeks    31,366   - 149,094 mIU/mL  9 Weeks    59,109   - 135,901 mIU/mL  10 Weeks   44,186   - 170,409 mIU/mL  12 Weeks   27,107   - 201,615 mIU/mL  14 Weeks   24,302   -  93,646 mIU/mL  15 Weeks   12,540   -  69,747 mIU/mL  16 Weeks    8,904   -  55,332 mIU/mL  17 Weeks    8,240   -  51,793 mIU/mL  18 Weeks    9,649   -  55,271 mIU/mL    Results may be falsely decreased if patient taking Biotin.      CBC Auto Differential [[id number]]  (Abnormal) Collected: 10/16/21 1709    Specimen: Blood Updated: 10/16/21 1730     WBC 9.73 10*3/mm3      RBC 5.18 10*6/mm3      Hemoglobin 13.8 g/dL      Hematocrit 40.1 %      MCV 77.4 fL      MCH 26.6 pg      MCHC 34.4 g/dL      RDW 14.7 %      RDW-SD 40.6 fl      MPV 10.5 fL      Platelets 286 10*3/mm3      Neutrophil % 75.4 %      Lymphocyte % 17.2 %      Monocyte % 6.7 %      Eosinophil % 0.2 %      Basophil % 0.2 %      Immature Grans % 0.3 %      Neutrophils, Absolute 7.34 10*3/mm3      Lymphocytes, Absolute 1.67 10*3/mm3      Monocytes, Absolute 0.65 10*3/mm3      Eosinophils, Absolute 0.02 10*3/mm3      Basophils, Absolute 0.02 10*3/mm3      Immature Grans, Absolute 0.03 10*3/mm3      nRBC 0.0 /100 WBC     Urinalysis With Microscopic If Indicated (No Culture) - Urine, Catheter [[id number]]  (Abnormal) Collected: 10/16/21 2001    Specimen: Urine, Catheter Updated: 10/16/21 2034     Color, UA Yellow     Appearance, UA Clear     pH, UA 5.5     Specific Gravity, UA 1.021     Glucose, UA Negative     Ketones, UA 80 mg/dL (3+)     Bilirubin, UA Negative     Blood, UA Trace     Protein, UA Trace     Leuk Esterase, UA Small (1+)     Nitrite, UA Negative     Urobilinogen, UA 1.0 E.U./dL    Urinalysis, Microscopic Only - Urine,  Catheter [[id number]]  (Abnormal) Collected: 10/16/21 2001    Specimen: Urine, Catheter Updated: 10/16/21 2044     RBC, UA 0-2 /HPF      WBC, UA 6-12 /HPF      Bacteria, UA 1+ /HPF      Squamous Epithelial Cells, UA 3-6 /HPF      Hyaline Casts, UA 7-12 /LPF      Amorphous Crystals, UA Small/1+ /HPF      Mucus, UA Small/1+ /HPF      Methodology Manual Light Microscopy    Urine Culture - Urine, Urine, Catheter In/Out [[id number]] Collected: 10/16/21 2001    Specimen: Urine, Catheter In/Out Updated: 10/16/21 2220    Chlamydia trachomatis, Neisseria gonorrhoeae, PCR - Swab, Cervix [[id number]] Collected: 10/16/21 2012    Specimen: Swab from Cervix Updated: 10/16/21 2023    Wet Prep, Genital - Swab, Vagina [[id number]]  (Abnormal) Collected: 10/16/21 2012    Specimen: Swab from Vagina Updated: 10/16/21 2046     YEAST No yeast seen     HYPHAL ELEMENTS No Hyphal elements seen     WBC'S 2+ WBC's seen     Clue Cells, Wet Prep No Clue cells seen     Trichomonas, Wet Prep No Trichomonas seen          I ordered the above labs and reviewed the results    RADIOLOGY  US Ob Limited 1 + Fetuses   Final Result   Single intrauterine gestation at 11 weeks. No abnormality   identified.       This report was finalized on 10/16/2021 6:56 PM by Dr. Robbi Peter M.D.          US Testicular or Ovarian Vascular Limited    (Results Pending)        I ordered the above noted radiological studies. Interpreted by radiologist. Viewed by me in PACS.       PROCEDURES  Procedures      PROGRESS AND CONSULTS  ED Course as of 10/17/21 0207   Sat Oct 16, 2021   1813 RH type: Positive [TD]   1813 HCG Quantitative: 257,067.00 [TD]   1854 Ultrasound shows 10 weeks and 6 days gestation.  Fetal heart beat 170 bpm [TD]   2054 Patient in the emergency department with decreased pain, nausea improved, tolerating p.o. well, ambulates to the bathroom without difficulty, heart rate in the low 90s, feels improved [TO]      ED Course User Index  [TD] Jono Boss  Jeremiah SOUZA MD  [TO] Olga Lidia Dalton MD           MEDICAL DECISION MAKING  Results were reviewed/discussed with the patient and they were also made aware of online access. Pt also made aware that some labs, such as cultures, will not be resulted during ER visit and follow up with PMD is necessary.       MDM       DIAGNOSIS  Final diagnoses:   Hyperemesis gravidarum, mild, before 23rd week   Hypokalemia       DISPOSITION  DISCHARGE    Patient discharged in stable condition.    Reviewed implications of results, diagnosis, meds, responsibility to follow up, warning signs and symptoms of possible worsening, potential complications and reasons to return to ER including persistent vomiting, fever, vaginal bleeding, increased abdominal pain or other concerns    Patient/Family voiced understanding of above instructions.    Discussed plan for discharge, as there is no emergent indication for admission. Patient referred to primary care provider for BP management due to today's BP. Pt/family is agreeable and understands need for follow up and repeat testing.  Pt is aware that discharge does not mean that nothing is wrong but it indicates no emergency is present that requires admission and they must continue care with follow-up as given below or physician of their choice.     FOLLOW-UP  Akosua De La Cruz MD  [address] [facility] [address] [phone]    Schedule an appointment as soon as possible for a visit in 3 days  EVEN IF WELL         Medication List      New Prescriptions    doxylamine-pyridoxine 10-10 MG tablet delayed-release EC tablet  Commonly known as: DICLEGIS  Take 2 tablets by mouth At Night As Needed (nausea/vomiting).        Stop    ferrous sulfate 325 (65 FE) MG tablet     ibuprofen 600 MG tablet  Commonly known as: ADVIL,MOTRIN     Prenatal 1 30-0.975-200 MG capsule           Where to Get Your Medications      These medications were sent to [id number] [place], [place] - [address]   AT [address] [place] [address] - [phone] PH - [phone] FX  [address] [facility] [id number] [address]    Phone: [phone]   · doxylamine-pyridoxine 10-10 MG tablet delayed-release EC tablet           Latest Documented Vital Signs:  As of 02:07 EDT  BP- 105/67 HR- 65 Temp- 98 °F (36.7 °C) (Tympanic) O2 sat- 100%    --  Patient was wearing facemask when I entered the room and throughout our encounter. Full protective equipment was worn throughout this patient encounter including a face mask, eye protection and gloves. Hand hygiene was performed before donning protective equipment and after removal when leaving the room.      Olga Lidia Dalton MD  10/17/21 0209

## 2021-10-16 NOTE — ED TRIAGE NOTES
11 weeks pregnant - has been cramping and spotting and vomiting x 3 weeks    Patient was placed in face mask during first look triage.  Patient was wearing a face mask throughout encounter.  I wore personal protective equipment throughout the encounter.  Hand hygiene was performed before and after patient encounter.

## 2021-10-17 NOTE — ED NOTES
Pt refused acetaminophen suppository and said she would rather have an oral form of the medication. Will confirm with Dr. Dalton.      Farideh Owen, RN  10/16/21 2038

## 2021-10-17 NOTE — DISCHARGE INSTRUCTIONS
You are advised to follow closely with Dr. Erasmo Gifford or gynecologist of your choice in 2-3 days for recheck, final results of lab work and imaging testing, and further testing/treatment as needed.    Drink plenty of fluids with carbohydrates such as Gatorade, ginger ale, juices and small amounts frequently.  Small frequent snacks throughout the day if you do not feel like eating a meal.  Bananas, rice, applesauce, toast and other simple carbohydrates are best for easy digestion.  Try to eat foods that are high in potassium.  If you are unable to take prenatal vitamins secondary to vomiting, please consider taking a chewable vitamin if you are able to tolerate it.    Follow the instructions below to use over the counter medications doxylamine and vitamin B6, pyridoxine as a less expensive alternative to Diclegis:    -Take 25mg vitamin B6, pyridoxine (either half of a 50mg tablet or a quarter of an 100mg tablet) and   12.5mg doxylamine (half of a 25mg tablet) by mouth at bedtime    Take both medications together and watch for drowsiness due to the doxylamine.      Please return to the emergency department immediately with chest pain different than usual for you, shortness of air, persistent or worsening abdominal pain, persistent vomiting/fever, blood in emesis or stool, lightheadedness/fainting, problems with speech, one sided weakness/numbness, new incontinence, problems with vision, vaginal bleeding or for worsening of symptoms or other concerns.

## 2021-10-18 LAB — BACTERIA SPEC AEROBE CULT: NO GROWTH

## 2021-10-19 LAB
C TRACH RRNA SPEC QL NAA+PROBE: NEGATIVE
N GONORRHOEA RRNA SPEC QL NAA+PROBE: NEGATIVE

## 2021-10-20 RX ORDER — ONDANSETRON 4 MG/1
4 TABLET, FILM COATED ORAL EVERY 6 HOURS PRN
Qty: 30 TABLET | Refills: 1 | Status: SHIPPED | OUTPATIENT
Start: 2021-10-20 | End: 2021-10-20 | Stop reason: SDUPTHER

## 2021-10-20 RX ORDER — ONDANSETRON 4 MG/1
4 TABLET, FILM COATED ORAL EVERY 6 HOURS PRN
Qty: 30 TABLET | Refills: 1 | Status: SHIPPED | OUTPATIENT
Start: 2021-10-20 | End: 2021-11-11

## 2021-11-11 ENCOUNTER — OFFICE VISIT (OUTPATIENT)
Dept: OBSTETRICS AND GYNECOLOGY | Age: 26
End: 2021-11-11

## 2021-11-11 VITALS
BODY MASS INDEX: 23.04 KG/M2 | HEIGHT: 63 IN | SYSTOLIC BLOOD PRESSURE: 108 MMHG | DIASTOLIC BLOOD PRESSURE: 60 MMHG | WEIGHT: 130 LBS

## 2021-11-11 DIAGNOSIS — O99.330 TOBACCO USE DURING PREGNANCY, ANTEPARTUM: ICD-10-CM

## 2021-11-11 DIAGNOSIS — R87.612 LGSIL ON PAP SMEAR OF CERVIX: ICD-10-CM

## 2021-11-11 DIAGNOSIS — O21.9 NAUSEA/VOMITING IN PREGNANCY: ICD-10-CM

## 2021-11-11 DIAGNOSIS — Z87.51 HISTORY OF PRETERM LABOR: ICD-10-CM

## 2021-11-11 DIAGNOSIS — Z3A.14 14 WEEKS GESTATION OF PREGNANCY: ICD-10-CM

## 2021-11-11 DIAGNOSIS — O34.211 MATERNAL CARE DUE TO LOW TRANSVERSE UTERINE SCAR FROM PREVIOUS CESAREAN DELIVERY: Primary | ICD-10-CM

## 2021-11-11 DIAGNOSIS — O09.299 HISTORY OF POSTPARTUM HEMORRHAGE, CURRENTLY PREGNANT: ICD-10-CM

## 2021-11-11 DIAGNOSIS — Z13.89 SCREENING FOR HEMATURIA OR PROTEINURIA: ICD-10-CM

## 2021-11-11 DIAGNOSIS — O98.119 SYPHILIS OF MOTHER DURING PREGNANCY: ICD-10-CM

## 2021-11-11 PROBLEM — O99.019 ANEMIA AFFECTING SEVENTH PREGNANCY: Status: RESOLVED | Noted: 2020-03-04 | Resolved: 2021-11-11

## 2021-11-11 PROBLEM — E87.6 HYPOKALEMIA: Status: RESOLVED | Noted: 2020-02-19 | Resolved: 2021-11-11

## 2021-11-11 PROBLEM — O34.219 PATIENT DESIRES VAGINAL BIRTH AFTER CESAREAN SECTION (VBAC): Status: RESOLVED | Noted: 2020-02-21 | Resolved: 2021-11-11

## 2021-11-11 PROBLEM — Z34.90 PREGNANT: Status: RESOLVED | Noted: 2020-03-18 | Resolved: 2021-11-11

## 2021-11-11 PROBLEM — O36.5990 POOR FETAL GROWTH AFFECTING MANAGEMENT OF MOTHER, ANTEPARTUM: Status: RESOLVED | Noted: 2020-03-18 | Resolved: 2021-11-11

## 2021-11-11 PROBLEM — O12.13 PROTEINURIA AFFECTING PREGNANCY IN THIRD TRIMESTER: Status: RESOLVED | Noted: 2020-02-19 | Resolved: 2021-11-11

## 2021-11-11 PROBLEM — O09.40 ANEMIA AFFECTING SEVENTH PREGNANCY: Status: RESOLVED | Noted: 2020-03-04 | Resolved: 2021-11-11

## 2021-11-11 PROBLEM — Z34.90 PREGNANCY: Status: RESOLVED | Noted: 2020-02-19 | Resolved: 2021-11-11

## 2021-11-11 LAB
BILIRUB BLD-MCNC: ABNORMAL MG/DL
CLARITY, POC: CLEAR
COLOR UR: YELLOW
GLUCOSE UR STRIP-MCNC: NEGATIVE MG/DL
KETONES UR QL: ABNORMAL
LEUKOCYTE EST, POC: ABNORMAL
NITRITE UR-MCNC: NEGATIVE MG/ML
PH UR: 6.5 [PH] (ref 5–8)
PROT UR STRIP-MCNC: ABNORMAL MG/DL
RBC # UR STRIP: ABNORMAL /UL
SP GR UR: 1.02 (ref 1–1.03)
UROBILINOGEN UR QL: NORMAL

## 2021-11-11 PROCEDURE — 99214 OFFICE O/P EST MOD 30 MIN: CPT | Performed by: STUDENT IN AN ORGANIZED HEALTH CARE EDUCATION/TRAINING PROGRAM

## 2021-11-11 PROCEDURE — 90471 IMMUNIZATION ADMIN: CPT | Performed by: STUDENT IN AN ORGANIZED HEALTH CARE EDUCATION/TRAINING PROGRAM

## 2021-11-11 PROCEDURE — 90686 IIV4 VACC NO PRSV 0.5 ML IM: CPT | Performed by: STUDENT IN AN ORGANIZED HEALTH CARE EDUCATION/TRAINING PROGRAM

## 2021-11-11 RX ORDER — PANTOPRAZOLE SODIUM 20 MG/1
40 TABLET, DELAYED RELEASE ORAL DAILY
Qty: 60 TABLET | Refills: 6 | Status: SHIPPED | OUTPATIENT
Start: 2021-11-11 | End: 2022-03-30 | Stop reason: HOSPADM

## 2021-11-11 RX ORDER — ONDANSETRON 4 MG/1
4 TABLET, ORALLY DISINTEGRATING ORAL EVERY 4 HOURS PRN
Qty: 60 TABLET | Refills: 2 | Status: SHIPPED | OUTPATIENT
Start: 2021-11-11 | End: 2022-03-30 | Stop reason: HOSPADM

## 2021-11-11 RX ORDER — METOCLOPRAMIDE 10 MG/1
10 TABLET ORAL EVERY 8 HOURS PRN
Qty: 30 TABLET | Refills: 2 | Status: SHIPPED | OUTPATIENT
Start: 2021-11-11 | End: 2022-03-30 | Stop reason: HOSPADM

## 2021-11-12 LAB
BLD GP AB SCN SERPL QL: NEGATIVE
HBV SURFACE AG SERPL QL IA: NEGATIVE
HCV AB S/CO SERPL IA: <0.1 S/CO RATIO (ref 0–0.9)
HIV 1+2 AB+HIV1 P24 AG SERPL QL IA: NON REACTIVE
RPR SER QL: REACTIVE
RPR SER-TITR: ABNORMAL {TITER}
RUBV IGG SERPL IA-ACNC: 9.15 INDEX
VZV IGG SER IA-ACNC: 1398 INDEX

## 2021-11-13 LAB
BACTERIA UR CULT: NORMAL
BACTERIA UR CULT: NORMAL

## 2021-11-17 LAB
CONV .: NORMAL
CYTOLOGIST CVX/VAG CYTO: NORMAL
CYTOLOGY CVX/VAG DOC CYTO: NORMAL
CYTOLOGY CVX/VAG DOC THIN PREP: NORMAL
DX ICD CODE: NORMAL
HIV 1 & 2 AB SER-IMP: NORMAL
Lab: NORMAL
OTHER STN SPEC: NORMAL
STAT OF ADQ CVX/VAG CYTO-IMP: NORMAL

## 2021-12-16 ENCOUNTER — ROUTINE PRENATAL (OUTPATIENT)
Dept: OBSTETRICS AND GYNECOLOGY | Age: 26
End: 2021-12-16

## 2021-12-16 VITALS — WEIGHT: 135.2 LBS | DIASTOLIC BLOOD PRESSURE: 64 MMHG | SYSTOLIC BLOOD PRESSURE: 102 MMHG | BODY MASS INDEX: 23.95 KG/M2

## 2021-12-16 DIAGNOSIS — O34.211 MATERNAL CARE DUE TO LOW TRANSVERSE UTERINE SCAR FROM PREVIOUS CESAREAN DELIVERY: ICD-10-CM

## 2021-12-16 DIAGNOSIS — O21.9 NAUSEA/VOMITING IN PREGNANCY: ICD-10-CM

## 2021-12-16 DIAGNOSIS — Z3A.19 19 WEEKS GESTATION OF PREGNANCY: Primary | ICD-10-CM

## 2021-12-16 DIAGNOSIS — O09.299 HISTORY OF POSTPARTUM HEMORRHAGE, CURRENTLY PREGNANT: ICD-10-CM

## 2021-12-16 DIAGNOSIS — Z87.51 HISTORY OF PRETERM LABOR: ICD-10-CM

## 2021-12-16 DIAGNOSIS — O98.119 SYPHILIS OF MOTHER DURING PREGNANCY: ICD-10-CM

## 2021-12-16 DIAGNOSIS — O43.102 PLACENTAL ABNORMALITY IN SECOND TRIMESTER: ICD-10-CM

## 2021-12-16 DIAGNOSIS — Z13.89 SCREENING FOR HEMATURIA OR PROTEINURIA: ICD-10-CM

## 2021-12-16 DIAGNOSIS — R87.612 LGSIL ON PAP SMEAR OF CERVIX: ICD-10-CM

## 2021-12-16 DIAGNOSIS — E61.1 IRON DEFICIENCY: ICD-10-CM

## 2021-12-16 LAB
CLARITY, POC: ABNORMAL
COLOR UR: YELLOW
GLUCOSE UR STRIP-MCNC: NEGATIVE MG/DL
PROT UR STRIP-MCNC: NEGATIVE MG/DL

## 2021-12-16 PROCEDURE — 99213 OFFICE O/P EST LOW 20 MIN: CPT | Performed by: STUDENT IN AN ORGANIZED HEALTH CARE EDUCATION/TRAINING PROGRAM

## 2021-12-16 RX ORDER — PRENATAL VIT NO.126/IRON/FOLIC 28MG-0.8MG
1 TABLET ORAL DAILY
COMMUNITY

## 2021-12-16 NOTE — PROGRESS NOTES
Chiquita Galvez presents at 19w4d for OB follow-up.  She is doing well today with no complaints.  Denies loss of fluid, vaginal bleeding, and contractions.  Endorses fetal movements.    Objective  BP Readings from Last 3 Encounters:   21 102/64   21 108/60   10/16/21 105/67      Wt Readings from Last 3 Encounters:   21 61.3 kg (135 lb 3.2 oz)   21 59 kg (130 lb)   10/16/21 60.8 kg (134 lb)        General: Awake, alert, no apparent distress  Respiratory: No increased work of breathing  Abdomen: Fundus soft and nontender  Extremity: Nontender, no edema    A/P  Diagnoses and all orders for this visit:    1. 19 weeks gestation of pregnancy (Primary)  Overview:  -PNL: normal, plan for 1hr GTT/CBC at 24-28wga  -Genetics: cfDNA normal, msAFP today, anatomy Us normal but need several f/u views, suspect MFM will repeat Us so no need to schedule right now  -Imm: RI, VI, tdap > 27wga, encouraged covid vaccine (patient amenable), s/p flu shot  -Contraception: Nexplanon, desires future pregnancies  -Birthplan: RCS    Orders:  -     POC Urinalysis Dipstick  -     Alpha Fetoprotein, Maternal  -     Ambulatory Referral to Perinatology    2. Screening for hematuria or proteinuria  -     POC Urinalysis Dipstick    3. LGSIL on Pap smear of cervix  Overview:  -20: LSIL pap, did not get planned colpo postpartum  -21: pap NIL  -plan to repeat pap in one year      4. Maternal care due to low transverse uterine scar from previous  delivery  Overview:  -prior c/s x 2  -first for NRFS at 34wga, op note reviewed by Dr. Mckeon and noted to be low transverse  -second RCS at term for asymmetric IUGR and fetal decels on Us, also low transverse, op note reviewed  -plan for RLTCS      5. Nausea/vomiting in pregnancy  Overview:  -weight: 134# (10/16) > 130# () > 135# today, continue to monitor  -resolved      6. History of postpartum hemorrhage, currently pregnant  Overview:  -preg in , received 4  "units PRBC   -Abs neg  -PPH precautions at delivery      7. History of  labor  Overview:  -h/o PTL at 34 weeks, delivered via c/s for non-reassuring fetal status (BPP 4/10)  -subsequent pregnancy and 5 prior all delivered at term      8. Iron deficiency  Overview:  -got iron transfusions in last pregnancy  -last Hb 13.8 (10/2021)      9. Syphilis of mother during pregnancy  Overview:  [facility] see media  ID consult 2020 \"adequate treatment, repeat RPR 6 mo\" > RPR titer stable      10. Placental abnormality in second trimester  Overview:  -placenta appears abnormal on anatomy Us today, possible lakes but also some areas of inc echogenicity  -referral to MFM ordered for evaluation, suspect they will repeat anatomy Us, will need follow up views if not    Orders:  -     Ambulatory Referral to Perinatology        SAB precautions reviewed  Return in about 4 weeks (around 2022) for Next scheduled follow up.    Heather Eckert MD    "

## 2021-12-18 LAB
AFP ADJ MOM SERPL: 1.81
AFP INTERP SERPL-IMP: NORMAL
AFP INTERP SERPL-IMP: NORMAL
AFP SERPL-MCNC: 115.9 NG/ML
AGE AT DELIVERY: 28.7 YR
GA METHOD: NORMAL
GA: 19.6 WEEKS
IDDM PATIENT QL: NO
LABORATORY COMMENT REPORT: NORMAL
MULTIPLE PREGNANCY: NO
NEURAL TUBE DEFECT RISK FETUS: 2495 %
RESULT: NORMAL

## 2021-12-20 ENCOUNTER — DOCUMENTATION (OUTPATIENT)
Dept: OBSTETRICS AND GYNECOLOGY | Age: 26
End: 2021-12-20

## 2021-12-20 ENCOUNTER — TELEPHONE (OUTPATIENT)
Dept: OBSTETRICS AND GYNECOLOGY | Age: 26
End: 2021-12-20

## 2021-12-20 DIAGNOSIS — R51.9 NONINTRACTABLE HEADACHE, UNSPECIFIED CHRONICITY PATTERN, UNSPECIFIED HEADACHE TYPE: ICD-10-CM

## 2021-12-20 DIAGNOSIS — R51.9 NONINTRACTABLE HEADACHE, UNSPECIFIED CHRONICITY PATTERN, UNSPECIFIED HEADACHE TYPE: Primary | ICD-10-CM

## 2021-12-20 RX ORDER — BUTALBITAL, ACETAMINOPHEN AND CAFFEINE 300; 40; 50 MG/1; MG/1; MG/1
1 CAPSULE ORAL EVERY 4 HOURS PRN
Qty: 20 CAPSULE | Refills: 1 | Status: SHIPPED | OUTPATIENT
Start: 2021-12-20 | End: 2022-03-30 | Stop reason: HOSPADM

## 2021-12-20 RX ORDER — BUTALBITAL, ACETAMINOPHEN AND CAFFEINE 300; 40; 50 MG/1; MG/1; MG/1
1 CAPSULE ORAL EVERY 4 HOURS PRN
Qty: 84 CAPSULE | Status: CANCELLED | OUTPATIENT
Start: 2021-12-20

## 2021-12-20 RX ORDER — BUTALBITAL, ACETAMINOPHEN AND CAFFEINE 300; 40; 50 MG/1; MG/1; MG/1
1 CAPSULE ORAL EVERY 4 HOURS PRN
Qty: 20 CAPSULE | Refills: 1 | Status: SHIPPED | OUTPATIENT
Start: 2021-12-20 | End: 2021-12-20

## 2021-12-20 NOTE — PROGRESS NOTES
Please call patient to let her know that her screen for neural tube defects was negative.Thank you,  Heather Eckert MD  12/20/21  07:51 EST

## 2021-12-21 NOTE — TELEPHONE ENCOUNTER
Received call from patient that she has had a severe headache with nausea and vomiting for 3 days despite tylenol.  She denies vision changes, problems changes, muscle weakness, slurring speech.  She denies h/o migraines or feeling this severe of a headache before.  Discussed that if she develops any symptoms as described above, she should come to hospital.  Rx fioricet sent to pharmacy.  Discussed that if no resolution with one dose, she should come to hospital.

## 2022-01-06 ENCOUNTER — OFFICE VISIT (OUTPATIENT)
Dept: OBSTETRICS AND GYNECOLOGY | Facility: CLINIC | Age: 27
End: 2022-01-06

## 2022-01-06 ENCOUNTER — HOSPITAL ENCOUNTER (OUTPATIENT)
Dept: ULTRASOUND IMAGING | Facility: HOSPITAL | Age: 27
Discharge: HOME | End: 2022-01-06
Admitting: STUDENT IN AN ORGANIZED HEALTH CARE EDUCATION/TRAINING PROGRAM

## 2022-01-06 ENCOUNTER — TRANSCRIBE ORDERS (OUTPATIENT)
Dept: ULTRASOUND IMAGING | Facility: HOSPITAL | Age: 27
End: 2022-01-06

## 2022-01-06 VITALS
TEMPERATURE: 98.4 F | HEIGHT: 63 IN | HEART RATE: 108 BPM | WEIGHT: 134.6 LBS | BODY MASS INDEX: 23.85 KG/M2 | SYSTOLIC BLOOD PRESSURE: 97 MMHG | DIASTOLIC BLOOD PRESSURE: 55 MMHG

## 2022-01-06 DIAGNOSIS — O26.12 POOR WEIGHT GAIN OF PREGNANCY, SECOND TRIMESTER: ICD-10-CM

## 2022-01-06 DIAGNOSIS — O36.5920 POOR FETAL GROWTH AFFECTING MANAGEMENT OF MOTHER IN SECOND TRIMESTER, SINGLE OR UNSPECIFIED FETUS: ICD-10-CM

## 2022-01-06 DIAGNOSIS — O43.102 PLACENTAL ABNORMALITY IN SECOND TRIMESTER: Primary | ICD-10-CM

## 2022-01-06 DIAGNOSIS — O43.92 ABNORMAL PLACENTA AFFECTING MANAGEMENT OF MOTHER IN SECOND TRIMESTER: Primary | ICD-10-CM

## 2022-01-06 DIAGNOSIS — O09.212 HISTORY OF PRETERM LABOR, CURRENT PREGNANCY, SECOND TRIMESTER: ICD-10-CM

## 2022-01-06 DIAGNOSIS — O43.192 MARGINAL INSERTION OF UMBILICAL CORD AFFECTING MANAGEMENT OF MOTHER IN SECOND TRIMESTER: ICD-10-CM

## 2022-01-06 DIAGNOSIS — O43.112 CIRCUMVALLATE PLACENTA IN SECOND TRIMESTER: ICD-10-CM

## 2022-01-06 DIAGNOSIS — O43.102 PLACENTAL ABNORMALITY IN SECOND TRIMESTER: ICD-10-CM

## 2022-01-06 DIAGNOSIS — O43.92 ABNORMAL PLACENTA AFFECTING MANAGEMENT OF MOTHER IN SECOND TRIMESTER: ICD-10-CM

## 2022-01-06 PROCEDURE — 99245 OFF/OP CONSLTJ NEW/EST HI 55: CPT | Performed by: OBSTETRICS & GYNECOLOGY

## 2022-01-06 PROCEDURE — 76820 UMBILICAL ARTERY ECHO: CPT

## 2022-01-06 PROCEDURE — 76820 UMBILICAL ARTERY ECHO: CPT | Performed by: OBSTETRICS & GYNECOLOGY

## 2022-01-06 PROCEDURE — 76811 OB US DETAILED SNGL FETUS: CPT | Performed by: OBSTETRICS & GYNECOLOGY

## 2022-01-06 PROCEDURE — 76811 OB US DETAILED SNGL FETUS: CPT

## 2022-01-06 NOTE — PROGRESS NOTES
"  Consultation:     Chiquita Galvez is a 26 y.o. G 8  P 4216 LMP 21 MARISA 22 by U/S now @ 22 4/7 weeks seen in consultation as requested by Heather Eckert MD secondary to:    1) Placental lakes seen on previous U/S  2) IUGR found on U/S today (AC - 8%)  3) Circumvallate placenta found on U/S today  4) Marginal cord insertion found on U/S today  5) Poor maternal weight gain 0.1 kg/m2    Vitals:    22 1041   BP: 97/55   BP Location: Right arm   Patient Position: Sitting   Pulse: 108   Temp: 98.4 °F (36.9 °C)   Weight: 61.1 kg (134 lb 9.6 oz)   Height: 160 cm (63\")        Pre-esequiel Labs:    Hemoglobin   Date Value Ref Range Status   10/16/2021 13.8 12.0 - 15.9 g/dL Final     Hematocrit   Date Value Ref Range Status   10/16/2021 40.1 34.0 - 46.6 % Final     Platelets   Date Value Ref Range Status   10/16/2021 286 140 - 450 10*3/mm3 Final     Rubella Antibodies, IgG   Date Value Ref Range Status   2021 9.15 Immune >0.99 index Final     Comment:                                     Non-immune       <0.90                                  Equivocal  0.90 - 0.99                                  Immune           >0.99       Hepatitis B Surface Ag   Date Value Ref Range Status   2021 Negative Negative Final   2020 Nonreactive Nonreactive Final     RPR   Date Value Ref Range Status   2021 Reactive (A) Non Reactive Final   2020 Reactive (A) Non-Reactive Final     ABO Type   Date Value Ref Range Status   10/16/2021 B  Final   2020 B  Final     RH type   Date Value Ref Range Status   10/16/2021 Positive  Final     Rh Factor   Date Value Ref Range Status   2020 Positive  Final     Comment:     Please note: Prior records for this patient's ABO / Rh type are not  available for additional verification.       Antibody Screen   Date Value Ref Range Status   2021 Negative Negative Final   2020 Negative  Final     HIV Screen 4th Gen w/RFX " (Reference)   Date Value Ref Range Status   2021 Non Reactive Non Reactive Final   2020 Nonreactive Nonreactive Final     Urine Culture   Date Value Ref Range Status   2021 Final report  Final   10/16/2021 No growth  Final     Result 1   Date Value Ref Range Status   2021 Comment  Final     Comment:     Mixed urogenital aneudy  10,000-25,000 colony forming units per mL       Neisseria gonorrhoeae, NICK   Date Value Ref Range Status   10/16/2021 Negative Negative Final     Chlamydia trachomatis, NICK   Date Value Ref Range Status   10/16/2021 Negative Negative Final     TSH   Date Value Ref Range Status   2020 1.110 0.270 - 4.200 uIU/mL Final     Gestational Diabetes Screen   Date Value Ref Range Status   2020 136 65 - 179 mg/dL Final     Glucose, GTT - 1 Hour   Date Value Ref Range Status   2020 86 74 - 180 mg/dL Final     Glucose, GTT - 3 Hour   Date Value Ref Range Status   2020 110 74 - 140 mg/dL Final     Note:   Date Value Ref Range Status   2021 Comment  Final     Comment:     The Pap smear is a screening test designed to aid in the detection of  premalignant and malignant conditions of the uterine cervix.  It is not a  diagnostic procedure and should not be used as the sole means of detecting  cervical cancer.  Both false-positive and false-negative reports do occur.       Strep Gp B NICK   Date Value Ref Range Status   2020 Negative Negative Final     Comment:     Centers for Disease Control and Prevention (CDC) and American Congress  of Obstetricians and Gynecologists (ACOG) guidelines for prevention of   group B streptococcal (GBS) disease specify co-collection of  a vaginal and rectal swab specimen to maximize sensitivity of GBS  detection. Per the CDC and ACOG, swabbing both the lower vagina and  rectum substantially increases the yield of detection compared with  sampling the vagina alone.  Penicillin G, ampicillin, or cefazolin are  indicated for intrapartum  prophylaxis of  GBS colonization. Reflex susceptibility  testing should be performed prior to use of clindamycin only on GBS  isolates from penicillin-allergic women who are considered a high risk  for anaphylaxis. Treatment with vancomycin without additional testing  is warranted if resistance to clindamycin is noted.           Previous Obstetrical History:    OB History    Para Term  AB Living   8 6 5 1 1 6   SAB IAB Ectopic Molar Multiple Live Births   1 0 0 0   6      # Outcome Date GA Lbr Andrew/2nd Weight Sex Delivery Anes PTL Lv   8 Current            7 Term 20 37w4d  2765 g (6 lb 1.5 oz) M CS-LTranv Spinal N RUTHY      Birth Comments: Panda 1   6  17 34w1d  1990 g (4 lb 6.2 oz) F CS-LTranv EPI Y RUTHY      Birth Comments: BPP 4/10       Complications: Abnormal  test   5 Term 11/08/15 38w2d / 00:04 3626 g (7 lb 15.9 oz) M Vag-Spont EPI N RUTHY      Birth Comments: del note reviewed - JHF       Complications: Postpartum hemorrhage   4 Term 14 40w0d  3555 g (7 lb 13.4 oz) F Vag-Spont None N RUTHY      Birth Comments: del note reviewed - no comps - JF    3 Term 13 40w3d  3720 g (8 lb 3.2 oz) M Vag-Spont EPI N RUTHY      Birth Comments: del note reviewed - no comps    2 Term 12 40w0d  3742 g (8 lb 4 oz) M Vag-Spont EPI  RUTHY      Birth Comments: del note reviewed - no comps - JHF    1 SAB 09 9w0d             Birth Comments: required D&C for retained placenta      Obstetric Comments   -10/16: CRL c/w 10+6wga, final MARISA 22   -: normal anatomy but several limited views, abnormal placenta, referred to [facility]     1)  - First trimester spontaneous  treated with D&C  2)  - Forceps assist delivery @ term, male, 8 lb, without complications  3)  -  @ term, male, 8 lb, without complications  4)  -  @ term, female, 7 lb 13 oz, without complications  5) 2015 -  @ 37 weeks, male, 8 lb, complicated  by PP depression  6) 2017 - Primary C/S @ 32 weeks secondary to abnormal fetal testing while in premature labor, female, 4 lb 3 oz, complicated by  contractions  7)  - Repeat C/S  @ 35 6/7 weeks due to poor A-P testing, male, 5 lb, without other complications.  This fetal weight would make a diagnosis of IUGR as it is between the 5th and 10th percentiles.    Previous Gyn History:    1) 2019 - Syphilis treated    Patient denies abnormal PAP/GC/Chlamydia.    Catamenia - 13 x 28-30 x 5  Contraception - none    Previous Medical History:    DM - patient denies HTN - patient denies Asthma - patient denies      Thyroid Disease - patient denies Rheumatic Fever - patient denies  Heart - patient denies   Lung - patient denies   Liver - patient denies  Kidney - patient denies   Fever - patient denies  TB - patient denies Herpes - patient denies    UTI - patient denies   Epilepsy - patient denies  Other - neg    Previous Surgical History:    1) As above    Medications:    PNV, Tylenol PRN    Allergies:    NKDA    No Known Allergies      Current Outpatient Medications on File Prior to Visit   Medication Sig Dispense Refill   • acetaminophen (TYLENOL) 500 MG tablet Take 500 mg by mouth Every 6 (Six) Hours As Needed for Mild Pain .     • butalbital-acetaminophen-caffeine (Fioricet) -40 MG capsule capsule Take 1 capsule by mouth Every 4 (Four) Hours As Needed (headache). 20 capsule 1   • prenatal vitamin (prenatal, CLASSIC, vitamin) tablet Take 1 tablet by mouth Daily.     • doxylamine-pyridoxine (DICLEGIS) 10-10 MG tablet delayed-release EC tablet Take 2 tablets by mouth At Night As Needed (nausea/vomiting). 14 tablet 0   • metoclopramide (Reglan) 10 MG tablet Take 1 tablet by mouth Every 8 (Eight) Hours As Needed (nausea or vomiting). 30 tablet 2   • ondansetron ODT (Zofran ODT) 4 MG disintegrating tablet Place 1 tablet on the tongue Every 4 (Four) Hours As Needed for Nausea or Vomiting. 60 tablet 2   •  pantoprazole (Protonix) 20 MG EC tablet Take 2 tablets by mouth Daily. 60 tablet 6     No current facility-administered medications on file prior to visit.        Habits:    Smoking - neg  Drinking - neg  Drugs - neg    Psychosocial:    Engaged; FOB is involved.     Sexual Abuse History: never  Physical Abuse History: never  Verbal Abuse History: never    Family History:    DM - neg HTN - neg Twins - neg Stillborns - neg  Birth defects - neg Mental Retardation - neg  Blood Dyscrasias - neg    Anesthesia Complications - neg Genetic - neg  Other - neg    Review of Systems   Otherwise negative    Remainder of exam deferred.      PLACENTAL LAKES/CYST:    General counseling was then performed regarding placental lakes/cysts.  Placental lakes/cysts are usually an incidental finding and usually does not  increase  morbidity and mortality.  It is reasonable to follow patients for fetal growth when large or multiple placental lakes/cysts are present.    RECURRENCE RISK FOR IUGR:    General counseling was then performed regarding recurrence risk or IUGR.   Intrauterine growth restriction (IUGR) is a leading cause of  morbidity and mortality, carrying a 20% recurrence rate. The placental disease is a cardinal factor among IUGR underlying processes.       RECURRENT  LABOR/ DELIVERY:    General counseling was performed regarding recurrent  labor.  The single most reliable risk factor identified is a previous  delivery increasing the risk for recurrence to 25%.  The role of serial cervical length examinations and FFN in diagnosing  labor risk was discussed.  The role of Cel[place] in reducing the severity of/preventing RDS as well as reducing the likelihood of necrotizing enterocolitis and patent ductus arteriosus was discussed.  17-OH Progesterone caproate weekly was discussed.  Patients who have delivered before 36 weeks due to PROM or PTL are candidates for  17-OH Progesterone Caproate.    CIRCUMVALLATE PLACENTA:    General counseling was the performed regarding circumvallate placenta.  Circumvallate placenta has been associated with an increased risk for poor fetal growth and placental abruption.  Serial U/S every 4 weeks for fetal growth is recommended.    MARGINAL CORD INSERTION/ ECCENTRIC:    General counseling was then performed for marginal eccentric cord insertion into the placenta.  IUGR may be associated with this condition.  This is defined as an umbilical cord which is situated within 2 cm of the placental edge.  Serial U/S every 4 weeks should be performed for fetal growth and umbilical cord localization    POOR MATERNAL WEIGHT GAIN:    General counseling was performed regarding poor maternal weight gain/low maternal weight.  Poor maternal weight/weight gain has been associated with IUGR as well as nutritional abnormalities that may result in anemia (Iron and Vitamin deficiencies).  The risk of IUGR is even greater if the patient's pre-pregnant BMI is less than 20.  Serial U/S every 4 weeks are recommended if clinical examination shows an abnormal fundal height or reduced estimate of fetal growth/Q 2 weeks if EFW <10% or AC< 10%..  Dietary counseling and nutritional supplementation with Ensure 2 cans TID should be considered.    IMPRESSIONS:    1) Placental lakes  2) Previous pregnancy affected by IUGR and poor fetal testing  3) IUGR found on U/S today (AC - 8%)  4) History of PTL/PTD in previous pregnancy  5) Circumvallate Placenta  6)) Marginal cord insertion  7) Poor maternal weight gain    RECOMMENDATIONS:    1) Serial U/S every 2-3 weeks for fetal growth  2) Weekly BPP beginning @ 26 weeks if AC or EFW  is < 10%  3) Ensure 2 cans BID  4) Follow-up growth in 3 weeks      Total time spent TODAY on this encounter, including pre-visit review of separately obtained history, face-to-face interaction including greater than 50% time spent in consultation  performing medically appropriate physical exam, patient counseling/education with greater than 50% in counseling, interpretation of diagnostic results, care coordination and documentation was 80 minutes.      Thank you for utilizing our ultrasound and consultative services.  If I may be of any further service, please do not hesitate to contact me.    Sincerely,    Sharath Pineda MD  Maternal-Fetal Medicine

## 2022-01-06 NOTE — PROGRESS NOTES
Pt reports that she is doing well and denies vaginal bleeding, cramping, contractions, LOF. Notes some stretching pain in lower right abdomen. Reports active fetal movement. Next OB appointment is 1/12/2022.

## 2022-01-12 ENCOUNTER — TELEPHONE (OUTPATIENT)
Dept: OBSTETRICS AND GYNECOLOGY | Age: 27
End: 2022-01-12

## 2022-01-12 ENCOUNTER — ROUTINE PRENATAL (OUTPATIENT)
Dept: OBSTETRICS AND GYNECOLOGY | Age: 27
End: 2022-01-12

## 2022-01-12 VITALS — DIASTOLIC BLOOD PRESSURE: 60 MMHG | WEIGHT: 132.6 LBS | BODY MASS INDEX: 23.49 KG/M2 | SYSTOLIC BLOOD PRESSURE: 102 MMHG

## 2022-01-12 DIAGNOSIS — O26.12 POOR WEIGHT GAIN OF PREGNANCY, SECOND TRIMESTER: ICD-10-CM

## 2022-01-12 DIAGNOSIS — O34.211 MATERNAL CARE DUE TO LOW TRANSVERSE UTERINE SCAR FROM PREVIOUS CESAREAN DELIVERY: ICD-10-CM

## 2022-01-12 DIAGNOSIS — O98.119 SYPHILIS OF MOTHER DURING PREGNANCY: ICD-10-CM

## 2022-01-12 DIAGNOSIS — O09.299 HISTORY OF POSTPARTUM HEMORRHAGE, CURRENTLY PREGNANT: ICD-10-CM

## 2022-01-12 DIAGNOSIS — O43.102 PLACENTAL ABNORMALITY IN SECOND TRIMESTER: ICD-10-CM

## 2022-01-12 DIAGNOSIS — O09.212 HISTORY OF PRETERM LABOR, CURRENT PREGNANCY, SECOND TRIMESTER: ICD-10-CM

## 2022-01-12 DIAGNOSIS — R87.612 LGSIL ON PAP SMEAR OF CERVIX: ICD-10-CM

## 2022-01-12 DIAGNOSIS — E61.1 IRON DEFICIENCY: ICD-10-CM

## 2022-01-12 DIAGNOSIS — O36.5920 POOR FETAL GROWTH AFFECTING MANAGEMENT OF MOTHER IN SECOND TRIMESTER, SINGLE OR UNSPECIFIED FETUS: ICD-10-CM

## 2022-01-12 DIAGNOSIS — Z3A.23 23 WEEKS GESTATION OF PREGNANCY: Primary | ICD-10-CM

## 2022-01-12 PROBLEM — O21.9 NAUSEA/VOMITING IN PREGNANCY: Status: RESOLVED | Noted: 2021-11-11 | Resolved: 2022-01-12

## 2022-01-12 PROBLEM — Z87.51 HISTORY OF PRETERM LABOR: Status: RESOLVED | Noted: 2020-02-12 | Resolved: 2022-01-12

## 2022-01-12 PROCEDURE — 99213 OFFICE O/P EST LOW 20 MIN: CPT | Performed by: STUDENT IN AN ORGANIZED HEALTH CARE EDUCATION/TRAINING PROGRAM

## 2022-01-12 RX ORDER — BUPRENORPHINE HYDROCHLORIDE 8 MG/1
TABLET SUBLINGUAL
COMMUNITY
Start: 2022-01-07 | End: 2022-03-30 | Stop reason: HOSPADM

## 2022-01-12 NOTE — PROGRESS NOTES
Chiquita Galvez presents at 23w3d for OB follow-up.  She is doing well today with no complaints.  Denies loss of fluid, vaginal bleeding, and contractions.  Endorses fetal movements.    Objective  BP Readings from Last 3 Encounters:   22 102/60   22 97/55   21 102/64      Wt Readings from Last 3 Encounters:   22 60.1 kg (132 lb 9.6 oz)   22 61.1 kg (134 lb 9.6 oz)   21 61.3 kg (135 lb 3.2 oz)        General: Awake, alert, no apparent distress  Respiratory: No increased work of breathing  Abdomen: Fundus soft and nontender  Extremity: Nontender, no edema    A/P  Diagnoses and all orders for this visit:    1. 23 weeks gestation of pregnancy (Primary)  Overview:  -PNL: normal, plan for 1hr GTT/CBC at next visit  -Genetics: cfDNA/msAFP normal, normal anatomy Us  -Imm: RI, VI, tdap at next visit, encouraged covid vaccine (patient scheduling now), s/p flu shot  -Contraception: Nexplanon, desires future pregnancies  -Birthplan: RCS      2. LGSIL on Pap smear of cervix  Overview:  -20: LSIL pap, did not get planned colpo postpartum  -21: pap NIL  -plan to repeat pap in one year      3. Maternal care due to low transverse uterine scar from previous  delivery  Overview:  -prior c/s x 2  -first for NRFS at 34wga, op note reviewed by Dr. Mckeon and noted to be low transverse  -second RCS at term for asymmetric IUGR and fetal decels on Us, also low transverse, op note reviewed  -plan for RLTCS      4. History of postpartum hemorrhage, currently pregnant  Overview:  -preg in , received 4 units PRBC   -Abs neg  -PPH precautions at delivery      5. History of  labor, current pregnancy, second trimester  Overview:  -h/o PTL at 34 weeks, delivered via c/s for non-reassuring fetal status (BPP 4/10)  -subsequent pregnancy and 5 prior all delivered at term  -s/p MFM consult, Dr. Diaz's note reflects discussion of inc risk of PTD and possibility of [place] but does not  "recommend that in the plan.  Patient reports she was told she would be getting [place].  We discussed that there is minimal risk with Phoebe so I am happy to prescribe it if she desires.  She would.  Ordered.  Continue through 36wga.  -CL 4.3cm on anatomy Us, no CL screening recommended      6. Poor fetal growth affecting management of mother in second trimester, single or unspecified fetus  Overview:  -Us 1/6: EFW 23%, AC 8%  -plan for serial growths q2-3wks and weekly dopplers/BPP >26wga per [facility] recs      7. Placental abnormality in second trimester  Overview:  -circumvallate with marginal cord insertion and placental lakes per MDM  -no intervention necessary      8. Poor weight gain of pregnancy, second trimester  Overview:  -lost 3# since NOB visit  -encouraged Ensure BID  -plan for serial growth Us      9. Iron deficiency  Overview:  -got iron transfusions in last pregnancy  -last Hb 13.8 (10/2021)      10. Syphilis of mother during pregnancy  Overview:  [facility], see media  ID consult 2/2020 \"adequate treatment, repeat RPR 6 mo\" > RPR titer stable          Labor precautions reviewed  Return in about 4 weeks (around 2/9/2022) for Next scheduled follow up with 1hr GTT/CBC.    Heather Eckert MD    "

## 2022-01-12 NOTE — TELEPHONE ENCOUNTER
"LVM for pt to call back to schedule [place] Injection (\"patient supplied\" samples in cabinet).  Needs to start ASAP.   "

## 2022-01-14 ENCOUNTER — TELEPHONE (OUTPATIENT)
Dept: OBSTETRICS AND GYNECOLOGY | Age: 27
End: 2022-01-14

## 2022-01-14 NOTE — TELEPHONE ENCOUNTER
Have called patient multiple times and left voicemails for her to call us back.  Can come in asap for Phoebe samples.

## 2022-01-14 NOTE — TELEPHONE ENCOUNTER
----- Message from Heather Eckert MD sent at 1/12/2022 12:11 PM EST -----  Can you all arrange weekly brandie injections for this patient starting now?  First dose ASAP!  Thanks!  Jimenez

## 2022-01-20 ENCOUNTER — CLINICAL SUPPORT (OUTPATIENT)
Dept: OBSTETRICS AND GYNECOLOGY | Age: 27
End: 2022-01-20

## 2022-01-20 DIAGNOSIS — O09.212 HISTORY OF PRETERM LABOR, CURRENT PREGNANCY, SECOND TRIMESTER: Primary | ICD-10-CM

## 2022-01-20 PROCEDURE — 96372 THER/PROPH/DIAG INJ SC/IM: CPT | Performed by: STUDENT IN AN ORGANIZED HEALTH CARE EDUCATION/TRAINING PROGRAM

## 2022-01-20 RX ORDER — HYDROXYPROGESTERONE CAPROATE 250 MG/ML
250 INJECTION INTRAMUSCULAR
Status: CANCELLED | OUTPATIENT
Start: 2022-01-20

## 2022-01-20 RX ORDER — HYDROXYPROGESTERONE CAPROATE 250 MG/ML
250 INJECTION INTRAMUSCULAR
Status: DISCONTINUED | OUTPATIENT
Start: 2022-01-20 | End: 2022-03-30 | Stop reason: HOSPADM

## 2022-01-20 RX ADMIN — HYDROXYPROGESTERONE CAPROATE 250 MG: 250 INJECTION INTRAMUSCULAR at 11:21

## 2022-01-26 ENCOUNTER — HOSPITAL ENCOUNTER (OUTPATIENT)
Dept: ULTRASOUND IMAGING | Facility: HOSPITAL | Age: 27
End: 2022-01-26

## 2022-01-31 ENCOUNTER — TELEPHONE (OUTPATIENT)
Dept: ULTRASOUND IMAGING | Facility: HOSPITAL | Age: 27
End: 2022-01-31

## 2022-01-31 NOTE — TELEPHONE ENCOUNTER
This was a R/S appt from last weeks no show. No show for this appt as well.  I attempted to call pt but her mailbox was full and no messages could be left. js

## 2022-02-17 ENCOUNTER — TRANSCRIBE ORDERS (OUTPATIENT)
Dept: ULTRASOUND IMAGING | Facility: HOSPITAL | Age: 27
End: 2022-02-17

## 2022-02-17 ENCOUNTER — ROUTINE PRENATAL (OUTPATIENT)
Dept: OBSTETRICS AND GYNECOLOGY | Age: 27
End: 2022-02-17

## 2022-02-17 VITALS — BODY MASS INDEX: 25.4 KG/M2 | DIASTOLIC BLOOD PRESSURE: 52 MMHG | WEIGHT: 143.4 LBS | SYSTOLIC BLOOD PRESSURE: 98 MMHG

## 2022-02-17 DIAGNOSIS — O43.102 PLACENTAL ABNORMALITY IN SECOND TRIMESTER: ICD-10-CM

## 2022-02-17 DIAGNOSIS — Z3A.28 28 WEEKS GESTATION OF PREGNANCY: ICD-10-CM

## 2022-02-17 DIAGNOSIS — O09.212 HISTORY OF PRETERM LABOR, CURRENT PREGNANCY, SECOND TRIMESTER: Primary | ICD-10-CM

## 2022-02-17 DIAGNOSIS — O34.211 MATERNAL CARE DUE TO LOW TRANSVERSE UTERINE SCAR FROM PREVIOUS CESAREAN DELIVERY: ICD-10-CM

## 2022-02-17 DIAGNOSIS — O26.13 POOR WEIGHT GAIN OF PREGNANCY, THIRD TRIMESTER: ICD-10-CM

## 2022-02-17 DIAGNOSIS — Z3A.23 23 WEEKS GESTATION OF PREGNANCY: ICD-10-CM

## 2022-02-17 DIAGNOSIS — O09.212 HISTORY OF PRETERM LABOR, CURRENT PREGNANCY, SECOND TRIMESTER: ICD-10-CM

## 2022-02-17 DIAGNOSIS — Z13.1 SCREENING FOR DIABETES MELLITUS (DM): ICD-10-CM

## 2022-02-17 DIAGNOSIS — E61.1 IRON DEFICIENCY: ICD-10-CM

## 2022-02-17 DIAGNOSIS — Z13.89 SCREENING FOR HEMATURIA OR PROTEINURIA: ICD-10-CM

## 2022-02-17 DIAGNOSIS — O09.299 HISTORY OF POSTPARTUM HEMORRHAGE, CURRENTLY PREGNANT: ICD-10-CM

## 2022-02-17 DIAGNOSIS — Z13.0 SCREENING FOR IRON DEFICIENCY ANEMIA: ICD-10-CM

## 2022-02-17 DIAGNOSIS — O36.5931 IUGR (INTRAUTERINE GROWTH RESTRICTION) AFFECTING CARE OF MOTHER, THIRD TRIMESTER, FETUS 1: ICD-10-CM

## 2022-02-17 DIAGNOSIS — R87.612 LGSIL ON PAP SMEAR OF CERVIX: Primary | ICD-10-CM

## 2022-02-17 DIAGNOSIS — O98.119 SYPHILIS OF MOTHER DURING PREGNANCY: ICD-10-CM

## 2022-02-17 PROBLEM — O36.5920 POOR FETAL GROWTH AFFECTING MANAGEMENT OF MOTHER IN SECOND TRIMESTER: Status: RESOLVED | Noted: 2020-03-18 | Resolved: 2022-02-17

## 2022-02-17 LAB
CLARITY, POC: CLEAR
COLOR UR: YELLOW
GLUCOSE UR STRIP-MCNC: NEGATIVE MG/DL
PROT UR STRIP-MCNC: ABNORMAL MG/DL

## 2022-02-17 PROCEDURE — 99214 OFFICE O/P EST MOD 30 MIN: CPT | Performed by: STUDENT IN AN ORGANIZED HEALTH CARE EDUCATION/TRAINING PROGRAM

## 2022-02-17 PROCEDURE — 90715 TDAP VACCINE 7 YRS/> IM: CPT | Performed by: STUDENT IN AN ORGANIZED HEALTH CARE EDUCATION/TRAINING PROGRAM

## 2022-02-17 PROCEDURE — 90471 IMMUNIZATION ADMIN: CPT | Performed by: STUDENT IN AN ORGANIZED HEALTH CARE EDUCATION/TRAINING PROGRAM

## 2022-02-17 NOTE — PROGRESS NOTES
Chiquita Galvez presents at 28w4d for OB follow-up.  She is doing well today with no complaints.  Denies loss of fluid, vaginal bleeding, and contractions.  Endorses fetal movements.  Missed last MFM appointment and struggling to make these appointments due to transportation and childcare issues.  Hoping to have a car soon.    Objective  BP Readings from Last 3 Encounters:   22 98/52   22 102/60   22 97/55      Wt Readings from Last 3 Encounters:   22 65 kg (143 lb 6.4 oz)   22 60.1 kg (132 lb 9.6 oz)   22 61.1 kg (134 lb 9.6 oz)      Total weight gain this pregnancy: 4.264 kg (9 lb 6.4 oz)    General: Awake, alert, no apparent distress  Respiratory: No increased work of breathing  Abdomen: Fundus soft and nontender  Extremity: Nontender, no edema    A/P  Diagnoses and all orders for this visit:    1. LGSIL on Pap smear of cervix (Primary)  Overview:  -20: LSIL pap, did not get planned colpo postpartum  -21: pap NIL  -plan to repeat pap in one year      2. Maternal care due to low transverse uterine scar from previous  delivery  Overview:  -prior c/s x 2  -first for NRFS at 34wga, op note reviewed by Dr. Mckeon and noted to be low transverse  -second RCS at term for asymmetric IUGR and fetal decels on Us, also low transverse, op note reviewed  -plan for RLTCS      3. History of postpartum hemorrhage, currently pregnant  Overview:  -preg in , received 4 units PRBC   -Abs neg  -PPH precautions at delivery      4. History of  labor, current pregnancy, second trimester  Overview:  -h/o PTL at 34 weeks, delivered via c/s for non-reassuring fetal status (BPP 4/10)  -subsequent pregnancy and 5 prior all delivered at term  -s/p MFM consult, Dr. Diaz's note reflects discussion of inc risk of PTD and possibility of [place] but does not recommend that in the plan; patient opted for Phoebe but has no showed several visits and would like to stop now due to  "transportation issues  -CL 4.3cm on anatomy Us, no CL screening recommended      5. 23 weeks gestation of pregnancy  Overview:  -PNL: normal, plan for 1hr GTT/CBC today  -Genetics: cfDNA/msAFP normal, normal anatomy Us  -Imm: RI, VI, tdap today, encouraged covid vaccine (patient scheduling now), s/p flu shot  -Contraception: Nexplanon, desires future pregnancies  -Birthplan: RCS      6. Placental abnormality in second trimester  Overview:  -circumvallate with marginal cord insertion and placental lakes per MDM  -no intervention necessary      7. Poor weight gain of pregnancy, third trimester  Overview:  -lost 3# since NOB visit  -encouraged Ensure BID  -plan for serial growth Us      8. Iron deficiency  Overview:  -got iron transfusions in last pregnancy  -last Hb 13.8 (10/2021)      9. Syphilis of mother during pregnancy  Overview:  [facility], see media  ID consult 2/2020 \"adequate treatment, repeat RPR 6 mo\" > RPR titer stable      10. 28 weeks gestation of pregnancy    11. Screening for hematuria or proteinuria  -     POC Urinalysis Dipstick    12. Screening for diabetes mellitus (DM)  -     Gestational Screen 1 Hr ([facility])    13. Screening for iron deficiency anemia  -     CBC (No Diff)    14. IUGR (intrauterine growth restriction) affecting care of mother, third trimester, fetus 1  Overview:  -AC 8% with normal UA doppler on MFM scan 1/6/22, missed last MFM appt  -added on for growth Us today, EFW 36% with AC 15%  -plan for MFM appt in 2 weeks then visit with me 2 wks later, trying to reduce appts 2/2 transportation issues      Other orders  -     Tdap Vaccine Greater Than or Equal To 8yo IM      Labor precautions reviewed  Return for see Tania to reschedule MFM appt in 2 wks then visit with me 2 wks later.    Heather Eckert MD    "

## 2022-02-18 LAB
ERYTHROCYTE [DISTWIDTH] IN BLOOD BY AUTOMATED COUNT: 15.9 % (ref 11.7–15.4)
GLUCOSE 1H P 50 G GLC PO SERPL-MCNC: 102 MG/DL (ref 65–139)
HCT VFR BLD AUTO: 26.7 % (ref 34–46.6)
HGB BLD-MCNC: 8.3 G/DL (ref 11.1–15.9)
MCH RBC QN AUTO: 23.1 PG (ref 26.6–33)
MCHC RBC AUTO-ENTMCNC: 31.1 G/DL (ref 31.5–35.7)
MCV RBC AUTO: 74 FL (ref 79–97)
PLATELET # BLD AUTO: 255 X10E3/UL (ref 150–450)
RBC # BLD AUTO: 3.59 X10E6/UL (ref 3.77–5.28)
WBC # BLD AUTO: 10.1 X10E3/UL (ref 3.4–10.8)

## 2022-02-18 RX ORDER — PNV NO.95/FERROUS FUM/FOLIC AC 28MG-0.8MG
325 TABLET ORAL 2 TIMES DAILY
Qty: 60 TABLET | Refills: 4 | Status: SHIPPED | OUTPATIENT
Start: 2022-02-18 | End: 2022-03-30 | Stop reason: HOSPADM

## 2022-02-18 NOTE — PROGRESS NOTES
Please call patient to let her know her gestational diabetes screen was normal.  She is notably anemic though.  I have sent a prescription for po iron to her pharmacy which I would like her to take twice a day.

## 2022-02-22 ENCOUNTER — DOCUMENTATION (OUTPATIENT)
Dept: OBSTETRICS AND GYNECOLOGY | Age: 27
End: 2022-02-22

## 2022-02-22 RX ORDER — PROMETHAZINE HYDROCHLORIDE 12.5 MG/1
12.5 TABLET ORAL EVERY 6 HOURS PRN
Qty: 30 TABLET | Refills: 1 | Status: SHIPPED | OUTPATIENT
Start: 2022-02-22 | End: 2022-03-30 | Stop reason: HOSPADM

## 2022-02-23 ENCOUNTER — APPOINTMENT (OUTPATIENT)
Dept: ULTRASOUND IMAGING | Facility: HOSPITAL | Age: 27
End: 2022-02-23

## 2022-03-02 ENCOUNTER — APPOINTMENT (OUTPATIENT)
Dept: ULTRASOUND IMAGING | Facility: HOSPITAL | Age: 27
End: 2022-03-02

## 2022-03-14 ENCOUNTER — HOSPITAL ENCOUNTER (OUTPATIENT)
Dept: ULTRASOUND IMAGING | Facility: HOSPITAL | Age: 27
Discharge: HOME | End: 2022-03-14
Admitting: STUDENT IN AN ORGANIZED HEALTH CARE EDUCATION/TRAINING PROGRAM

## 2022-03-14 ENCOUNTER — OFFICE VISIT (OUTPATIENT)
Dept: OBSTETRICS AND GYNECOLOGY | Facility: CLINIC | Age: 27
End: 2022-03-14

## 2022-03-14 VITALS
TEMPERATURE: 98.6 F | DIASTOLIC BLOOD PRESSURE: 60 MMHG | BODY MASS INDEX: 26.97 KG/M2 | WEIGHT: 152.2 LBS | HEART RATE: 111 BPM | SYSTOLIC BLOOD PRESSURE: 94 MMHG | HEIGHT: 63 IN

## 2022-03-14 DIAGNOSIS — Z36.89 ENCOUNTER FOR ULTRASOUND TO ASSESS FETAL GROWTH: Primary | ICD-10-CM

## 2022-03-14 DIAGNOSIS — O09.212 HISTORY OF PRETERM LABOR, CURRENT PREGNANCY, SECOND TRIMESTER: ICD-10-CM

## 2022-03-14 DIAGNOSIS — O36.5931 IUGR (INTRAUTERINE GROWTH RESTRICTION) AFFECTING CARE OF MOTHER, THIRD TRIMESTER, FETUS 1: ICD-10-CM

## 2022-03-14 PROCEDURE — 99212 OFFICE O/P EST SF 10 MIN: CPT | Performed by: OBSTETRICS & GYNECOLOGY

## 2022-03-14 PROCEDURE — 76816 OB US FOLLOW-UP PER FETUS: CPT | Performed by: OBSTETRICS & GYNECOLOGY

## 2022-03-14 PROCEDURE — 76816 OB US FOLLOW-UP PER FETUS: CPT

## 2022-03-14 NOTE — PROGRESS NOTES
"Growth ultrasound    MATERNAL FETAL MEDICINE VISIT  Chiquita Galvez is a 26 y.o.  at 32w1d  being seen today by Maternal Fetal Medicine for follow up growth    REVIEW OF SYSTEMS:  Denies nausea or vomiting  Denies abdominal pain/cramping/tenderness/contractions  Reports + fetal movement  Denies vaginal bleeding or leaking of fluid    PHYSICAL EXAM:  BP 94/60 (BP Location: Left arm, Patient Position: Sitting)   Pulse 111   Temp 98.6 °F (37 °C)   Ht 160 cm (63\")   Wt 69 kg (152 lb 3.2 oz)   BMI 26.96 kg/m²   General: pleasant, alert and oriented  Abdomen: soft, non tender  Extremites: bilat lower extremities soft, non tender, no edema noted    ULTRASOUND:  Appropriate interval growth  Follow up as clinically indicated.  Thank you for allowing us to participate in the care of this patient.  Please call with any questions.    Again thank you for requesting a MFM consult.  If we can be of any further assistance to you, please do not hesitate to contact us.    Reva Keys MD  MATERNAL FETAL MEDICINE              VISIT SYNOPSIS:    ASSESSMENT/PLAN:  MS. Chiquita Galvez is a 26 y.o.  at 32w1d with the following visit diagnosis    Diagnoses and all orders for this visit:    1. Encounter for ultrasound to assess fetal growth (Primary)        The above diagnosis have been evaluated and determined to be stable    This note has been routed to Dr. Eckert     At the end of this consultation all patient questions were answered, concerns addressed, and comprehensive management plan and follow up reviewed with patient.      I spent 15 minutes caring for Chiquita on this date of service. This time includes time spent by me in the following activities: obtaining and/or reviewing a separately obtained history, performing a medically appropriate examination and/or evaluation, counseling and educating the patient/family/caregiver and independently interpreting results and communicating that information with the " patient/family/caregiver with greater than 50% spent in counseling and coordination of care.

## 2022-03-14 NOTE — PROGRESS NOTES
Pt reports that she is doing well and denies vaginal bleeding, cramping, contractions, LOF at this time. Reports active fetal movement. Notes that 2 weeks ago she had episode of spotting noticed on the toilet paper after voiding. Reports that she made her OB aware and had no spotting after 2 days. Denies current vaginal bleeding. Reports irregular ctxs at night. Labor precautions reviewed. Notes her next OB appointment is scheduled for 3/17.

## 2022-03-17 ENCOUNTER — TELEPHONE (OUTPATIENT)
Dept: OBSTETRICS AND GYNECOLOGY | Age: 27
End: 2022-03-17

## 2022-03-17 NOTE — TELEPHONE ENCOUNTER
Spoke w/pt regarding her no show appt today.  She stated her Medicare transportation had the time wrong.  Pt rescheduled for 03/22/2022.

## 2022-03-27 ENCOUNTER — HOSPITAL ENCOUNTER (INPATIENT)
Facility: HOSPITAL | Age: 27
LOS: 2 days | Discharge: HOME | End: 2022-03-30
Attending: OBSTETRICS & GYNECOLOGY | Admitting: OBSTETRICS & GYNECOLOGY

## 2022-03-27 DIAGNOSIS — O36.8390 FETAL HEART RATE DECELERATIONS AFFECTING MANAGEMENT OF MOTHER: ICD-10-CM

## 2022-03-27 DIAGNOSIS — U07.1 COVID-19 VIRUS INFECTION: Primary | ICD-10-CM

## 2022-03-27 DIAGNOSIS — O99.013 ANEMIA AFFECTING PREGNANCY IN THIRD TRIMESTER: ICD-10-CM

## 2022-03-27 DIAGNOSIS — O43.112 CIRCUMVALLATE PLACENTA IN SECOND TRIMESTER: ICD-10-CM

## 2022-03-27 LAB
ABO GROUP BLD: NORMAL
AMPHET+METHAMPHET UR QL: NEGATIVE
ANISOCYTOSIS BLD QL: ABNORMAL
BACTERIA UR QL AUTO: ABNORMAL /HPF
BARBITURATES UR QL SCN: NEGATIVE
BENZODIAZ UR QL SCN: NEGATIVE
BILIRUB UR QL STRIP: NEGATIVE
BLD GP AB SCN SERPL QL: NEGATIVE
BURR CELLS BLD QL SMEAR: ABNORMAL
CANNABINOIDS SERPL QL: NEGATIVE
CLARITY UR: CLEAR
COCAINE UR QL: NEGATIVE
COLOR UR: YELLOW
DEPRECATED RDW RBC AUTO: 44.3 FL (ref 37–54)
EOSINOPHIL # BLD MANUAL: 0.32 10*3/MM3 (ref 0–0.4)
EOSINOPHIL NFR BLD MANUAL: 2 % (ref 0.3–6.2)
ERYTHROCYTE [DISTWIDTH] IN BLOOD BY AUTOMATED COUNT: 17.9 % (ref 12.3–15.4)
GLUCOSE UR STRIP-MCNC: NEGATIVE MG/DL
HCT VFR BLD AUTO: 25.5 % (ref 34–46.6)
HGB BLD-MCNC: 7.8 G/DL (ref 12–15.9)
HGB UR QL STRIP.AUTO: NEGATIVE
HYALINE CASTS UR QL AUTO: ABNORMAL /LPF
HYPOCHROMIA BLD QL: ABNORMAL
KETONES UR QL STRIP: NEGATIVE
LEUKOCYTE ESTERASE UR QL STRIP.AUTO: ABNORMAL
LYMPHOCYTES # BLD MANUAL: 2.12 10*3/MM3 (ref 0.7–3.1)
LYMPHOCYTES NFR BLD MANUAL: 9.2 % (ref 5–12)
MCH RBC QN AUTO: 21.4 PG (ref 26.6–33)
MCHC RBC AUTO-ENTMCNC: 30.6 G/DL (ref 31.5–35.7)
MCV RBC AUTO: 69.9 FL (ref 79–97)
METHADONE UR QL SCN: NEGATIVE
MICROCYTES BLD QL: ABNORMAL
MONOCYTES # BLD: 1.47 10*3/MM3 (ref 0.1–0.9)
NEUTROPHILS # BLD AUTO: 12.03 10*3/MM3 (ref 1.7–7)
NEUTROPHILS NFR BLD MANUAL: 75.5 % (ref 42.7–76)
NITRITE UR QL STRIP: NEGATIVE
OPIATES UR QL: NEGATIVE
OVALOCYTES BLD QL SMEAR: ABNORMAL
OXYCODONE UR QL SCN: NEGATIVE
PH UR STRIP.AUTO: 6.5 [PH] (ref 5–8)
PLAT MORPH BLD: NORMAL
PLATELET # BLD AUTO: 300 10*3/MM3 (ref 140–450)
PMV BLD AUTO: 10.5 FL (ref 6–12)
POIKILOCYTOSIS BLD QL SMEAR: ABNORMAL
POLYCHROMASIA BLD QL SMEAR: ABNORMAL
PROT UR QL STRIP: NEGATIVE
RBC # BLD AUTO: 3.65 10*6/MM3 (ref 3.77–5.28)
RBC # UR STRIP: ABNORMAL /HPF
REF LAB TEST METHOD: ABNORMAL
RH BLD: POSITIVE
SARS-COV-2 RNA PNL SPEC NAA+PROBE: DETECTED
SP GR UR STRIP: 1.01 (ref 1–1.03)
SQUAMOUS #/AREA URNS HPF: ABNORMAL /HPF
T&S EXPIRATION DATE: NORMAL
UROBILINOGEN UR QL STRIP: ABNORMAL
VARIANT LYMPHS NFR BLD MANUAL: 13.3 % (ref 19.6–45.3)
WBC # UR STRIP: ABNORMAL /HPF
WBC MORPH BLD: NORMAL
WBC NRBC COR # BLD: 15.94 10*3/MM3 (ref 3.4–10.8)

## 2022-03-27 PROCEDURE — 86901 BLOOD TYPING SEROLOGIC RH(D): CPT | Performed by: OBSTETRICS & GYNECOLOGY

## 2022-03-27 PROCEDURE — 81001 URINALYSIS AUTO W/SCOPE: CPT | Performed by: OBSTETRICS & GYNECOLOGY

## 2022-03-27 PROCEDURE — 80307 DRUG TEST PRSMV CHEM ANLYZR: CPT | Performed by: OBSTETRICS & GYNECOLOGY

## 2022-03-27 PROCEDURE — 85025 COMPLETE CBC W/AUTO DIFF WBC: CPT | Performed by: OBSTETRICS & GYNECOLOGY

## 2022-03-27 PROCEDURE — 85007 BL SMEAR W/DIFF WBC COUNT: CPT | Performed by: OBSTETRICS & GYNECOLOGY

## 2022-03-27 PROCEDURE — 86900 BLOOD TYPING SEROLOGIC ABO: CPT | Performed by: OBSTETRICS & GYNECOLOGY

## 2022-03-27 PROCEDURE — 87635 SARS-COV-2 COVID-19 AMP PRB: CPT | Performed by: OBSTETRICS & GYNECOLOGY

## 2022-03-27 PROCEDURE — 99202 OFFICE O/P NEW SF 15 MIN: CPT | Performed by: OBSTETRICS & GYNECOLOGY

## 2022-03-27 PROCEDURE — 86850 RBC ANTIBODY SCREEN: CPT | Performed by: OBSTETRICS & GYNECOLOGY

## 2022-03-27 PROCEDURE — 86923 COMPATIBILITY TEST ELECTRIC: CPT

## 2022-03-27 RX ORDER — SODIUM CHLORIDE 0.9 % (FLUSH) 0.9 %
10 SYRINGE (ML) INJECTION EVERY 12 HOURS SCHEDULED
Status: DISCONTINUED | OUTPATIENT
Start: 2022-03-27 | End: 2022-03-28

## 2022-03-27 RX ORDER — SODIUM CHLORIDE 0.9 % (FLUSH) 0.9 %
10 SYRINGE (ML) INJECTION AS NEEDED
Status: DISCONTINUED | OUTPATIENT
Start: 2022-03-27 | End: 2022-03-28

## 2022-03-27 RX ADMIN — SODIUM CHLORIDE, POTASSIUM CHLORIDE, SODIUM LACTATE AND CALCIUM CHLORIDE 1000 ML: 600; 310; 30; 20 INJECTION, SOLUTION INTRAVENOUS at 21:00

## 2022-03-28 ENCOUNTER — ANESTHESIA EVENT (OUTPATIENT)
Dept: LABOR AND DELIVERY | Facility: HOSPITAL | Age: 27
End: 2022-03-28

## 2022-03-28 ENCOUNTER — ANESTHESIA (OUTPATIENT)
Dept: LABOR AND DELIVERY | Facility: HOSPITAL | Age: 27
End: 2022-03-28

## 2022-03-28 PROBLEM — O99.013 ANEMIA AFFECTING PREGNANCY IN THIRD TRIMESTER: Status: ACTIVE | Noted: 2022-03-28

## 2022-03-28 PROBLEM — O36.8390 FETAL HEART RATE DECELERATIONS AFFECTING MANAGEMENT OF MOTHER: Status: ACTIVE | Noted: 2022-03-28

## 2022-03-28 PROBLEM — O36.8130 DECREASED FETAL MOVEMENTS IN THIRD TRIMESTER: Status: ACTIVE | Noted: 2022-03-28

## 2022-03-28 PROBLEM — Z34.90 PREGNANCY: Status: ACTIVE | Noted: 2022-03-28

## 2022-03-28 LAB
ATMOSPHERIC PRESS: 757.3 MMHG
ATMOSPHERIC PRESS: 757.5 MMHG
BASE EXCESS BLDCOA CALC-SCNC: -4.4 MMOL/L
BASE EXCESS BLDCOV CALC-SCNC: -2.2 MMOL/L (ref -30–30)
BASOPHILS # BLD AUTO: 0.05 10*3/MM3 (ref 0–0.2)
BASOPHILS NFR BLD AUTO: 0.3 % (ref 0–1.5)
BDY SITE: ABNORMAL
BDY SITE: ABNORMAL
COLLECT TME SMN: ABNORMAL
DEPRECATED RDW RBC AUTO: 49.8 FL (ref 37–54)
EOSINOPHIL # BLD AUTO: 0.01 10*3/MM3 (ref 0–0.4)
EOSINOPHIL NFR BLD AUTO: 0.1 % (ref 0.3–6.2)
ERYTHROCYTE [DISTWIDTH] IN BLOOD BY AUTOMATED COUNT: 20.1 % (ref 12.3–15.4)
HCO3 BLDCOA-SCNC: 22.5 MMOL/L (ref 22–28)
HCO3 BLDCOV-SCNC: 22 MMOL/L
HCT VFR BLD AUTO: 31.6 % (ref 34–46.6)
HGB BLD-MCNC: 10 G/DL (ref 12–15.9)
HIV1+2 AB SER QL: NORMAL
LYMPHOCYTES # BLD AUTO: 2.07 10*3/MM3 (ref 0.7–3.1)
LYMPHOCYTES NFR BLD AUTO: 10.4 % (ref 19.6–45.3)
MCH RBC QN AUTO: 22.5 PG (ref 26.6–33)
MCHC RBC AUTO-ENTMCNC: 31.6 G/DL (ref 31.5–35.7)
MCV RBC AUTO: 71.2 FL (ref 79–97)
MODALITY: ABNORMAL
MODALITY: ABNORMAL
MONOCYTES # BLD AUTO: 0.4 10*3/MM3 (ref 0.1–0.9)
MONOCYTES NFR BLD AUTO: 2 % (ref 5–12)
NEUTROPHILS NFR BLD AUTO: 17.1 10*3/MM3 (ref 1.7–7)
NEUTROPHILS NFR BLD AUTO: 85.8 % (ref 42.7–76)
NOTE: ABNORMAL
NOTE: ABNORMAL
PCO2 BLDCOA: 47.8 MMHG (ref 43–63)
PCO2 BLDCOV: 35.1 MM HG (ref 35–51.3)
PH BLDCOA: 7.28 PH UNITS (ref 7.18–7.34)
PH BLDCOV: 7.41 PH UNITS (ref 7.26–7.4)
PLATELET # BLD AUTO: 271 10*3/MM3 (ref 140–450)
PMV BLD AUTO: 11.2 FL (ref 6–12)
PO2 BLDCOA: 22.6 MMHG (ref 12–26)
PO2 BLDCOV: 31.1 MM HG (ref 19–39)
RBC # BLD AUTO: 4.44 10*6/MM3 (ref 3.77–5.28)
RPR SER QL: REACTIVE
RPR SER-TITR: ABNORMAL {TITER}
SAO2 % BLDCOA: 32 % (ref 92–99)
SAO2 % BLDCOA: 60.9 % (ref 92–99)
SAO2 % BLDCOV: ABNORMAL %
WBC NRBC COR # BLD: 19.9 10*3/MM3 (ref 3.4–10.8)

## 2022-03-28 PROCEDURE — 63710000001 DIPHENHYDRAMINE PER 50 MG: Performed by: NURSE ANESTHETIST, CERTIFIED REGISTERED

## 2022-03-28 PROCEDURE — 25010000002 PHENYLEPHRINE 10 MG/ML SOLUTION: Performed by: NURSE ANESTHETIST, CERTIFIED REGISTERED

## 2022-03-28 PROCEDURE — P9016 RBC LEUKOCYTES REDUCED: HCPCS

## 2022-03-28 PROCEDURE — 25010000002 HYDROMORPHONE PER 4 MG: Performed by: NURSE ANESTHETIST, CERTIFIED REGISTERED

## 2022-03-28 PROCEDURE — 88307 TISSUE EXAM BY PATHOLOGIST: CPT

## 2022-03-28 PROCEDURE — S0260 H&P FOR SURGERY: HCPCS | Performed by: OBSTETRICS & GYNECOLOGY

## 2022-03-28 PROCEDURE — 59515 CESAREAN DELIVERY: CPT | Performed by: OBSTETRICS & GYNECOLOGY

## 2022-03-28 PROCEDURE — 85025 COMPLETE CBC W/AUTO DIFF WBC: CPT | Performed by: STUDENT IN AN ORGANIZED HEALTH CARE EDUCATION/TRAINING PROGRAM

## 2022-03-28 PROCEDURE — 86592 SYPHILIS TEST NON-TREP QUAL: CPT | Performed by: STUDENT IN AN ORGANIZED HEALTH CARE EDUCATION/TRAINING PROGRAM

## 2022-03-28 PROCEDURE — 25010000002 BETAMETHASONE ACET & SOD PHOS PER 4 MG: Performed by: OBSTETRICS & GYNECOLOGY

## 2022-03-28 PROCEDURE — 86780 TREPONEMA PALLIDUM: CPT | Performed by: STUDENT IN AN ORGANIZED HEALTH CARE EDUCATION/TRAINING PROGRAM

## 2022-03-28 PROCEDURE — 25010000002 CEFAZOLIN IN DEXTROSE 2-4 GM/100ML-% SOLUTION: Performed by: OBSTETRICS & GYNECOLOGY

## 2022-03-28 PROCEDURE — 25010000002 ONDANSETRON PER 1 MG: Performed by: ANESTHESIOLOGY

## 2022-03-28 PROCEDURE — G0432 EIA HIV-1/HIV-2 SCREEN: HCPCS | Performed by: STUDENT IN AN ORGANIZED HEALTH CARE EDUCATION/TRAINING PROGRAM

## 2022-03-28 PROCEDURE — 82803 BLOOD GASES ANY COMBINATION: CPT

## 2022-03-28 PROCEDURE — 86900 BLOOD TYPING SEROLOGIC ABO: CPT

## 2022-03-28 PROCEDURE — 25010000002 MORPHINE PER 10 MG: Performed by: ANESTHESIOLOGY

## 2022-03-28 PROCEDURE — 86593 SYPHILIS TEST NON-TREP QUANT: CPT | Performed by: STUDENT IN AN ORGANIZED HEALTH CARE EDUCATION/TRAINING PROGRAM

## 2022-03-28 PROCEDURE — 36430 TRANSFUSION BLD/BLD COMPNT: CPT

## 2022-03-28 RX ORDER — NALOXONE HCL 0.4 MG/ML
0.2 VIAL (ML) INJECTION
Status: DISCONTINUED | OUTPATIENT
Start: 2022-03-28 | End: 2022-03-30 | Stop reason: HOSPADM

## 2022-03-28 RX ORDER — HYDROXYZINE 50 MG/1
50 TABLET, FILM COATED ORAL EVERY 6 HOURS PRN
Status: DISCONTINUED | OUTPATIENT
Start: 2022-03-28 | End: 2022-03-30 | Stop reason: HOSPADM

## 2022-03-28 RX ORDER — SIMETHICONE 80 MG
80 TABLET,CHEWABLE ORAL
Status: DISCONTINUED | OUTPATIENT
Start: 2022-03-28 | End: 2022-03-30 | Stop reason: HOSPADM

## 2022-03-28 RX ORDER — MORPHINE SULFATE 2 MG/ML
2 INJECTION, SOLUTION INTRAMUSCULAR; INTRAVENOUS
Status: ACTIVE | OUTPATIENT
Start: 2022-03-28 | End: 2022-03-29

## 2022-03-28 RX ORDER — SODIUM CHLORIDE, SODIUM LACTATE, POTASSIUM CHLORIDE, CALCIUM CHLORIDE 600; 310; 30; 20 MG/100ML; MG/100ML; MG/100ML; MG/100ML
125 INJECTION, SOLUTION INTRAVENOUS CONTINUOUS
Status: DISCONTINUED | OUTPATIENT
Start: 2022-03-28 | End: 2022-03-28

## 2022-03-28 RX ORDER — IBUPROFEN 600 MG/1
600 TABLET ORAL EVERY 6 HOURS SCHEDULED
Status: DISCONTINUED | OUTPATIENT
Start: 2022-03-28 | End: 2022-03-28

## 2022-03-28 RX ORDER — OXYTOCIN/0.9 % SODIUM CHLORIDE 30/500 ML
250 PLASTIC BAG, INJECTION (ML) INTRAVENOUS CONTINUOUS PRN
Status: ACTIVE | OUTPATIENT
Start: 2022-03-28 | End: 2022-03-28

## 2022-03-28 RX ORDER — ACETAMINOPHEN 500 MG
1000 TABLET ORAL ONCE
Status: COMPLETED | OUTPATIENT
Start: 2022-03-28 | End: 2022-03-28

## 2022-03-28 RX ORDER — PHYTONADIONE 1 MG/.5ML
INJECTION, EMULSION INTRAMUSCULAR; INTRAVENOUS; SUBCUTANEOUS
Status: ACTIVE
Start: 2022-03-28 | End: 2022-03-28

## 2022-03-28 RX ORDER — ERYTHROMYCIN 5 MG/G
OINTMENT OPHTHALMIC
Status: ACTIVE
Start: 2022-03-28 | End: 2022-03-28

## 2022-03-28 RX ORDER — PHENYLEPHRINE HYDROCHLORIDE 10 MG/ML
INJECTION INTRAVENOUS AS NEEDED
Status: DISCONTINUED | OUTPATIENT
Start: 2022-03-28 | End: 2022-03-28 | Stop reason: SURG

## 2022-03-28 RX ORDER — OXYTOCIN/0.9 % SODIUM CHLORIDE 30/500 ML
125 PLASTIC BAG, INJECTION (ML) INTRAVENOUS ONCE
Status: COMPLETED | OUTPATIENT
Start: 2022-03-28 | End: 2022-03-28

## 2022-03-28 RX ORDER — OXYTOCIN/0.9 % SODIUM CHLORIDE 30/500 ML
125 PLASTIC BAG, INJECTION (ML) INTRAVENOUS CONTINUOUS PRN
Status: DISCONTINUED | OUTPATIENT
Start: 2022-03-28 | End: 2022-03-28 | Stop reason: HOSPADM

## 2022-03-28 RX ORDER — FAMOTIDINE 10 MG/ML
20 INJECTION, SOLUTION INTRAVENOUS ONCE AS NEEDED
Status: DISCONTINUED | OUTPATIENT
Start: 2022-03-28 | End: 2022-03-28 | Stop reason: SDUPTHER

## 2022-03-28 RX ORDER — ACETAMINOPHEN 500 MG
1000 TABLET ORAL ONCE
Status: DISCONTINUED | OUTPATIENT
Start: 2022-03-28 | End: 2022-03-28 | Stop reason: SDUPTHER

## 2022-03-28 RX ORDER — OXYCODONE HYDROCHLORIDE 5 MG/1
5 TABLET ORAL EVERY 4 HOURS PRN
Status: DISCONTINUED | OUTPATIENT
Start: 2022-03-28 | End: 2022-03-30 | Stop reason: HOSPADM

## 2022-03-28 RX ORDER — ACETAMINOPHEN 325 MG/1
650 TABLET ORAL EVERY 6 HOURS
Status: DISCONTINUED | OUTPATIENT
Start: 2022-03-28 | End: 2022-03-30

## 2022-03-28 RX ORDER — CARBOPROST TROMETHAMINE 250 UG/ML
250 INJECTION, SOLUTION INTRAMUSCULAR
Status: DISCONTINUED | OUTPATIENT
Start: 2022-03-28 | End: 2022-03-28 | Stop reason: HOSPADM

## 2022-03-28 RX ORDER — FAMOTIDINE 10 MG/ML
20 INJECTION, SOLUTION INTRAVENOUS ONCE AS NEEDED
Status: COMPLETED | OUTPATIENT
Start: 2022-03-28 | End: 2022-03-28

## 2022-03-28 RX ORDER — OXYCODONE AND ACETAMINOPHEN 10; 325 MG/1; MG/1
1 TABLET ORAL EVERY 4 HOURS PRN
Status: DISCONTINUED | OUTPATIENT
Start: 2022-03-28 | End: 2022-03-28

## 2022-03-28 RX ORDER — DIPHENHYDRAMINE HYDROCHLORIDE 50 MG/ML
25 INJECTION INTRAMUSCULAR; INTRAVENOUS EVERY 4 HOURS PRN
Status: DISCONTINUED | OUTPATIENT
Start: 2022-03-28 | End: 2022-03-30 | Stop reason: HOSPADM

## 2022-03-28 RX ORDER — OXYCODONE HYDROCHLORIDE 5 MG/1
10 TABLET ORAL EVERY 4 HOURS PRN
Status: DISCONTINUED | OUTPATIENT
Start: 2022-03-28 | End: 2022-03-30 | Stop reason: HOSPADM

## 2022-03-28 RX ORDER — HYDROMORPHONE HYDROCHLORIDE 1 MG/ML
0.5 INJECTION, SOLUTION INTRAMUSCULAR; INTRAVENOUS; SUBCUTANEOUS
Status: DISCONTINUED | OUTPATIENT
Start: 2022-03-28 | End: 2022-03-28

## 2022-03-28 RX ORDER — CEFAZOLIN SODIUM 2 G/100ML
2 INJECTION, SOLUTION INTRAVENOUS ONCE
Status: COMPLETED | OUTPATIENT
Start: 2022-03-28 | End: 2022-03-28

## 2022-03-28 RX ORDER — OXYTOCIN/0.9 % SODIUM CHLORIDE 30/500 ML
999 PLASTIC BAG, INJECTION (ML) INTRAVENOUS ONCE
Status: DISCONTINUED | OUTPATIENT
Start: 2022-03-28 | End: 2022-03-28 | Stop reason: HOSPADM

## 2022-03-28 RX ORDER — BETAMETHASONE SODIUM PHOSPHATE AND BETAMETHASONE ACETATE 3; 3 MG/ML; MG/ML
12 INJECTION, SUSPENSION INTRA-ARTICULAR; INTRALESIONAL; INTRAMUSCULAR; SOFT TISSUE EVERY 24 HOURS
Status: COMPLETED | OUTPATIENT
Start: 2022-03-28 | End: 2022-03-28

## 2022-03-28 RX ORDER — MORPHINE SULFATE 1 MG/ML
INJECTION, SOLUTION EPIDURAL; INTRATHECAL; INTRAVENOUS
Status: COMPLETED | OUTPATIENT
Start: 2022-03-28 | End: 2022-03-28

## 2022-03-28 RX ORDER — ACETAMINOPHEN 500 MG
1000 TABLET ORAL EVERY 6 HOURS PRN
Status: DISCONTINUED | OUTPATIENT
Start: 2022-03-28 | End: 2022-03-28

## 2022-03-28 RX ORDER — DOCUSATE SODIUM 100 MG/1
100 CAPSULE, LIQUID FILLED ORAL 2 TIMES DAILY
Status: DISCONTINUED | OUTPATIENT
Start: 2022-03-28 | End: 2022-03-30 | Stop reason: HOSPADM

## 2022-03-28 RX ORDER — OXYCODONE HYDROCHLORIDE AND ACETAMINOPHEN 5; 325 MG/1; MG/1
1 TABLET ORAL EVERY 4 HOURS PRN
Status: DISCONTINUED | OUTPATIENT
Start: 2022-03-28 | End: 2022-03-28

## 2022-03-28 RX ORDER — IBUPROFEN 600 MG/1
600 TABLET ORAL EVERY 6 HOURS PRN
Status: DISCONTINUED | OUTPATIENT
Start: 2022-03-28 | End: 2022-03-28

## 2022-03-28 RX ORDER — ONDANSETRON 2 MG/ML
4 INJECTION INTRAMUSCULAR; INTRAVENOUS EVERY 6 HOURS PRN
Status: DISCONTINUED | OUTPATIENT
Start: 2022-03-28 | End: 2022-03-28

## 2022-03-28 RX ORDER — HYDROCORTISONE 25 MG/G
CREAM TOPICAL 3 TIMES DAILY PRN
Status: DISCONTINUED | OUTPATIENT
Start: 2022-03-28 | End: 2022-03-30 | Stop reason: HOSPADM

## 2022-03-28 RX ORDER — BUPIVACAINE HYDROCHLORIDE 7.5 MG/ML
INJECTION, SOLUTION EPIDURAL; RETROBULBAR
Status: COMPLETED | OUTPATIENT
Start: 2022-03-28 | End: 2022-03-28

## 2022-03-28 RX ORDER — METHYLERGONOVINE MALEATE 0.2 MG/ML
200 INJECTION INTRAVENOUS ONCE AS NEEDED
Status: DISCONTINUED | OUTPATIENT
Start: 2022-03-28 | End: 2022-03-28 | Stop reason: HOSPADM

## 2022-03-28 RX ORDER — ONDANSETRON 2 MG/ML
4 INJECTION INTRAMUSCULAR; INTRAVENOUS ONCE AS NEEDED
Status: DISCONTINUED | OUTPATIENT
Start: 2022-03-28 | End: 2022-03-28

## 2022-03-28 RX ORDER — IBUPROFEN 600 MG/1
600 TABLET ORAL EVERY 6 HOURS SCHEDULED
Status: DISCONTINUED | OUTPATIENT
Start: 2022-03-28 | End: 2022-03-30 | Stop reason: HOSPADM

## 2022-03-28 RX ORDER — EPHEDRINE SULFATE 50 MG/ML
INJECTION, SOLUTION INTRAVENOUS AS NEEDED
Status: DISCONTINUED | OUTPATIENT
Start: 2022-03-28 | End: 2022-03-28 | Stop reason: SURG

## 2022-03-28 RX ORDER — ONDANSETRON 2 MG/ML
4 INJECTION INTRAMUSCULAR; INTRAVENOUS EVERY 4 HOURS PRN
Status: DISCONTINUED | OUTPATIENT
Start: 2022-03-28 | End: 2022-03-30 | Stop reason: HOSPADM

## 2022-03-28 RX ORDER — MISOPROSTOL 200 UG/1
800 TABLET ORAL ONCE AS NEEDED
Status: DISCONTINUED | OUTPATIENT
Start: 2022-03-28 | End: 2022-03-28 | Stop reason: HOSPADM

## 2022-03-28 RX ORDER — DIPHENHYDRAMINE HCL 25 MG
25 CAPSULE ORAL EVERY 4 HOURS PRN
Status: DISCONTINUED | OUTPATIENT
Start: 2022-03-28 | End: 2022-03-30 | Stop reason: HOSPADM

## 2022-03-28 RX ORDER — ONDANSETRON 2 MG/ML
4 INJECTION INTRAMUSCULAR; INTRAVENOUS ONCE AS NEEDED
Status: COMPLETED | OUTPATIENT
Start: 2022-03-28 | End: 2022-03-28

## 2022-03-28 RX ADMIN — OXYCODONE 10 MG: 5 TABLET ORAL at 15:24

## 2022-03-28 RX ADMIN — PHENYLEPHRINE HYDROCHLORIDE 100 MCG: 10 INJECTION, SOLUTION INTRAVENOUS at 06:43

## 2022-03-28 RX ADMIN — BETAMETHASONE SODIUM PHOSPHATE AND BETAMETHASONE ACETATE 12 MG: 3; 3 INJECTION, SUSPENSION INTRA-ARTICULAR; INTRALESIONAL; INTRAMUSCULAR at 05:16

## 2022-03-28 RX ADMIN — OXYCODONE 10 MG: 5 TABLET ORAL at 10:53

## 2022-03-28 RX ADMIN — MORPHINE SULFATE 150 MCG: 1 INJECTION, SOLUTION EPIDURAL; INTRATHECAL; INTRAVENOUS at 06:23

## 2022-03-28 RX ADMIN — FAMOTIDINE 20 MG: 10 INJECTION INTRAVENOUS at 05:16

## 2022-03-28 RX ADMIN — PHENYLEPHRINE HYDROCHLORIDE 100 MCG: 10 INJECTION, SOLUTION INTRAVENOUS at 06:39

## 2022-03-28 RX ADMIN — EPHEDRINE SULFATE 10 MG: 50 INJECTION INTRAVENOUS at 07:03

## 2022-03-28 RX ADMIN — PHENYLEPHRINE HYDROCHLORIDE 100 MCG: 10 INJECTION, SOLUTION INTRAVENOUS at 06:41

## 2022-03-28 RX ADMIN — ACETAMINOPHEN 650 MG: 325 TABLET ORAL at 20:29

## 2022-03-28 RX ADMIN — SODIUM CHLORIDE, POTASSIUM CHLORIDE, SODIUM LACTATE AND CALCIUM CHLORIDE 125 ML/HR: 600; 310; 30; 20 INJECTION, SOLUTION INTRAVENOUS at 02:51

## 2022-03-28 RX ADMIN — DIPHENHYDRAMINE HYDROCHLORIDE 25 MG: 25 CAPSULE ORAL at 12:08

## 2022-03-28 RX ADMIN — PHENYLEPHRINE HYDROCHLORIDE 100 MCG: 10 INJECTION, SOLUTION INTRAVENOUS at 06:48

## 2022-03-28 RX ADMIN — PHENYLEPHRINE HYDROCHLORIDE 200 MCG: 10 INJECTION, SOLUTION INTRAVENOUS at 06:37

## 2022-03-28 RX ADMIN — PHENYLEPHRINE HYDROCHLORIDE 200 MCG: 10 INJECTION, SOLUTION INTRAVENOUS at 06:34

## 2022-03-28 RX ADMIN — EPHEDRINE SULFATE 10 MG: 50 INJECTION INTRAVENOUS at 06:28

## 2022-03-28 RX ADMIN — ACETAMINOPHEN 650 MG: 325 TABLET ORAL at 15:25

## 2022-03-28 RX ADMIN — Medication 125 ML/HR: at 08:04

## 2022-03-28 RX ADMIN — PHENYLEPHRINE HYDROCHLORIDE 100 MCG: 10 INJECTION, SOLUTION INTRAVENOUS at 06:24

## 2022-03-28 RX ADMIN — IBUPROFEN 600 MG: 600 TABLET ORAL at 20:29

## 2022-03-28 RX ADMIN — CEFAZOLIN SODIUM 2 G: 2 INJECTION, SOLUTION INTRAVENOUS at 06:06

## 2022-03-28 RX ADMIN — OXYCODONE 10 MG: 5 TABLET ORAL at 19:55

## 2022-03-28 RX ADMIN — ACETAMINOPHEN 1000 MG: 500 TABLET ORAL at 05:15

## 2022-03-28 RX ADMIN — PHENYLEPHRINE HYDROCHLORIDE 100 MCG: 10 INJECTION, SOLUTION INTRAVENOUS at 06:45

## 2022-03-28 RX ADMIN — PHENYLEPHRINE HYDROCHLORIDE 100 MCG: 10 INJECTION, SOLUTION INTRAVENOUS at 06:56

## 2022-03-28 RX ADMIN — BUPIVACAINE HYDROCHLORIDE 1.6 ML: 7.5 INJECTION, SOLUTION EPIDURAL; RETROBULBAR at 06:23

## 2022-03-28 RX ADMIN — PHENYLEPHRINE HYDROCHLORIDE 100 MCG: 10 INJECTION, SOLUTION INTRAVENOUS at 06:50

## 2022-03-28 RX ADMIN — SIMETHICONE 80 MG: 80 TABLET, CHEWABLE ORAL at 19:55

## 2022-03-28 RX ADMIN — PHENYLEPHRINE HYDROCHLORIDE 100 MCG: 10 INJECTION, SOLUTION INTRAVENOUS at 07:00

## 2022-03-28 RX ADMIN — PHENYLEPHRINE HYDROCHLORIDE 100 MCG: 10 INJECTION, SOLUTION INTRAVENOUS at 07:05

## 2022-03-28 RX ADMIN — ONDANSETRON 4 MG: 2 INJECTION INTRAMUSCULAR; INTRAVENOUS at 05:16

## 2022-03-28 RX ADMIN — PHENYLEPHRINE HYDROCHLORIDE 100 MCG: 10 INJECTION, SOLUTION INTRAVENOUS at 06:52

## 2022-03-28 RX ADMIN — PHENYLEPHRINE HYDROCHLORIDE 200 MCG: 10 INJECTION, SOLUTION INTRAVENOUS at 06:28

## 2022-03-28 RX ADMIN — PHENYLEPHRINE HYDROCHLORIDE 100 MCG: 10 INJECTION, SOLUTION INTRAVENOUS at 07:02

## 2022-03-28 RX ADMIN — Medication 999 ML/HR: at 06:45

## 2022-03-28 RX ADMIN — PHENYLEPHRINE HYDROCHLORIDE 200 MCG: 10 INJECTION, SOLUTION INTRAVENOUS at 06:58

## 2022-03-28 RX ADMIN — HYDROMORPHONE HYDROCHLORIDE 0.5 MG: 1 INJECTION, SOLUTION INTRAMUSCULAR; INTRAVENOUS; SUBCUTANEOUS at 09:13

## 2022-03-28 RX ADMIN — HYDROMORPHONE HYDROCHLORIDE 0.5 MG: 1 INJECTION, SOLUTION INTRAMUSCULAR; INTRAVENOUS; SUBCUTANEOUS at 08:05

## 2022-03-28 RX ADMIN — PHENYLEPHRINE HYDROCHLORIDE 100 MCG: 10 INJECTION, SOLUTION INTRAVENOUS at 06:32

## 2022-03-28 RX ADMIN — PHENYLEPHRINE HYDROCHLORIDE 100 MCG: 10 INJECTION, SOLUTION INTRAVENOUS at 06:54

## 2022-03-28 RX ADMIN — PHENYLEPHRINE HYDROCHLORIDE 200 MCG: 10 INJECTION, SOLUTION INTRAVENOUS at 06:30

## 2022-03-28 NOTE — ANESTHESIA POSTPROCEDURE EVALUATION
"Patient: Chiquita Galvez    Procedure Summary     Date: 22 Room / [facility]    Anesthesia Start: 616 Anesthesia Stop: 745    Procedure:  SECTION PRIMARY (Bilateral Abdomen) Diagnosis:     Surgeons: Brooks Irwin MD Provider: Speedy Johnson MD    Anesthesia Type: spinal ASA Status: 3          Anesthesia Type: spinal    Vitals  Vitals Value Taken Time   BP 93/56 22 1121   Temp 36.6 °C (97.8 °F) 22 1121   Pulse 84 22 1121   Resp 16 22 1121   SpO2 96 % 22 1121           Post Anesthesia Care and Evaluation    Patient location during evaluation: bedside  Patient participation: complete - patient participated  Level of consciousness: awake and alert  Pain management: adequate  Airway patency: patent  Anesthetic complications: No anesthetic complications    Cardiovascular status: acceptable  Respiratory status: acceptable  Hydration status: acceptable    Comments: BP 93/56 (BP Location: Left arm, Patient Position: Lying)   Pulse 84   Temp 36.6 °C (97.8 °F) (Oral)   Resp 16   Ht 160 cm (63\")   Wt 67.9 kg (149 lb 9.6 oz)   SpO2 96%   Breastfeeding Yes   BMI 26.50 kg/m²       "

## 2022-03-28 NOTE — ANESTHESIA PREPROCEDURE EVALUATION
Anesthesia Evaluation     Patient summary reviewed and Nursing notes reviewed   no history of anesthetic complications:  NPO Solid Status: > 8 hours  NPO Liquid Status: > 8 hours           Airway   Mallampati: II  TM distance: >3 FB  Neck ROM: full  No difficulty expected  Dental - normal exam     Pulmonary    (-) rhonchi, decreased breath sounds, wheezes, not a smoker  Cardiovascular   Exercise tolerance: good (4-7 METS)    Rhythm: regular  Rate: normal    (-) hypertension, CAD, angina, THORNE, murmur      Neuro/Psych  (-) CVA  GI/Hepatic/Renal/Endo    (-) liver disease, no renal disease, diabetes, no thyroid disorder    Musculoskeletal     Radiculopathy: Anemia hbg 7.8.  Abdominal     Abdomen: soft.   Substance History      OB/GYN    (+) Pregnant,         Other                        Anesthesia Plan    ASA 3     spinal       Anesthetic plan, all risks, benefits, and alternatives have been provided, discussed and informed consent has been obtained with: patient.        CODE STATUS:

## 2022-03-28 NOTE — ANESTHESIA PROCEDURE NOTES
Spinal Block      Patient reassessed immediately prior to procedure    Patient location during procedure: OR  Start Time: 3/28/2022 6:19 AM  Stop Time: 3/28/2022 6:23 AM  Indication:post-op pain management and procedure for pain  Preanesthetic Checklist  Completed: patient identified, IV checked, site marked, risks and benefits discussed, surgical consent, monitors and equipment checked, pre-op evaluation and timeout performed  Spinal Block Prep:  Patient Position:sitting  Sterile Tech:cap, gloves, mask and sterile barriers  Prep:Chloraprep  Patient Monitoring:blood pressure monitoring, continuous pulse oximetry and EKG  Spinal Block Procedure  Approach:midline  Guidance:palpation technique  Location:L4-L5  Needle Type:Remington  Needle Gauge:25 G  Placement of Spinal needle event:cerebrospinal fluid aspirated  Paresthesia: no  Fluid Appearance:clear  Medications: Morphine PF injection, 150 mcg  bupivacaine PF (MARCAINE) 0.75 % injection, 1.6 mL  Med Administered at 3/28/2022 6:23 AM   Post Assessment  Patient Tolerance:patient tolerated the procedure well with no apparent complications  Complications no

## 2022-03-29 LAB
BASOPHILS # BLD AUTO: 0.05 10*3/MM3 (ref 0–0.2)
BASOPHILS NFR BLD AUTO: 0.2 % (ref 0–1.5)
BH BB BLOOD EXPIRATION DATE: NORMAL
BH BB BLOOD EXPIRATION DATE: NORMAL
BH BB BLOOD TYPE BARCODE: 7300
BH BB BLOOD TYPE BARCODE: 7300
BH BB DISPENSE STATUS: NORMAL
BH BB DISPENSE STATUS: NORMAL
BH BB PRODUCT CODE: NORMAL
BH BB PRODUCT CODE: NORMAL
BH BB UNIT NUMBER: NORMAL
BH BB UNIT NUMBER: NORMAL
BUPRENORPHINE UR QL: NEGATIVE NG/ML
CROSSMATCH INTERPRETATION: NORMAL
CROSSMATCH INTERPRETATION: NORMAL
DEPRECATED RDW RBC AUTO: 52.5 FL (ref 37–54)
EOSINOPHIL # BLD AUTO: 0.01 10*3/MM3 (ref 0–0.4)
EOSINOPHIL NFR BLD AUTO: 0 % (ref 0.3–6.2)
ERYTHROCYTE [DISTWIDTH] IN BLOOD BY AUTOMATED COUNT: 20.2 % (ref 12.3–15.4)
HCT VFR BLD AUTO: 31.2 % (ref 34–46.6)
HGB BLD-MCNC: 9.6 G/DL (ref 12–15.9)
LYMPHOCYTES # BLD AUTO: 3.02 10*3/MM3 (ref 0.7–3.1)
LYMPHOCYTES NFR BLD AUTO: 12.7 % (ref 19.6–45.3)
MCH RBC QN AUTO: 22.5 PG (ref 26.6–33)
MCHC RBC AUTO-ENTMCNC: 30.8 G/DL (ref 31.5–35.7)
MCV RBC AUTO: 73.2 FL (ref 79–97)
MONOCYTES # BLD AUTO: 1.46 10*3/MM3 (ref 0.1–0.9)
MONOCYTES NFR BLD AUTO: 6.1 % (ref 5–12)
NEUTROPHILS NFR BLD AUTO: 18.88 10*3/MM3 (ref 1.7–7)
NEUTROPHILS NFR BLD AUTO: 79.5 % (ref 42.7–76)
PLATELET # BLD AUTO: 295 10*3/MM3 (ref 140–450)
PMV BLD AUTO: 10.8 FL (ref 6–12)
RBC # BLD AUTO: 4.26 10*6/MM3 (ref 3.77–5.28)
UNIT  ABO: NORMAL
UNIT  ABO: NORMAL
UNIT  RH: NORMAL
UNIT  RH: NORMAL
WBC NRBC COR # BLD: 23.77 10*3/MM3 (ref 3.4–10.8)

## 2022-03-29 PROCEDURE — 85025 COMPLETE CBC W/AUTO DIFF WBC: CPT | Performed by: OBSTETRICS & GYNECOLOGY

## 2022-03-29 PROCEDURE — 63710000001 DIPHENHYDRAMINE PER 50 MG: Performed by: NURSE ANESTHETIST, CERTIFIED REGISTERED

## 2022-03-29 RX ADMIN — SIMETHICONE 80 MG: 80 TABLET, CHEWABLE ORAL at 21:53

## 2022-03-29 RX ADMIN — OXYCODONE 10 MG: 5 TABLET ORAL at 17:32

## 2022-03-29 RX ADMIN — DOCUSATE SODIUM 100 MG: 100 CAPSULE, LIQUID FILLED ORAL at 09:13

## 2022-03-29 RX ADMIN — SIMETHICONE 80 MG: 80 TABLET, CHEWABLE ORAL at 07:55

## 2022-03-29 RX ADMIN — DIPHENHYDRAMINE HYDROCHLORIDE 25 MG: 25 CAPSULE ORAL at 20:16

## 2022-03-29 RX ADMIN — OXYCODONE 10 MG: 5 TABLET ORAL at 13:20

## 2022-03-29 RX ADMIN — OXYCODONE 10 MG: 5 TABLET ORAL at 04:54

## 2022-03-29 RX ADMIN — SIMETHICONE 80 MG: 80 TABLET, CHEWABLE ORAL at 17:35

## 2022-03-29 RX ADMIN — ACETAMINOPHEN 650 MG: 325 TABLET ORAL at 09:05

## 2022-03-29 RX ADMIN — OXYCODONE 10 MG: 5 TABLET ORAL at 00:11

## 2022-03-29 RX ADMIN — DOCUSATE SODIUM 100 MG: 100 CAPSULE, LIQUID FILLED ORAL at 20:16

## 2022-03-29 RX ADMIN — IBUPROFEN 600 MG: 600 TABLET ORAL at 14:35

## 2022-03-29 RX ADMIN — ACETAMINOPHEN 650 MG: 325 TABLET ORAL at 02:56

## 2022-03-29 RX ADMIN — DIPHENHYDRAMINE HYDROCHLORIDE 25 MG: 25 CAPSULE ORAL at 06:16

## 2022-03-29 RX ADMIN — ACETAMINOPHEN 650 MG: 325 TABLET ORAL at 21:53

## 2022-03-29 RX ADMIN — IBUPROFEN 600 MG: 600 TABLET ORAL at 02:56

## 2022-03-29 RX ADMIN — ACETAMINOPHEN 650 MG: 325 TABLET ORAL at 16:39

## 2022-03-29 RX ADMIN — OXYCODONE 5 MG: 5 TABLET ORAL at 21:53

## 2022-03-29 RX ADMIN — OXYCODONE 10 MG: 5 TABLET ORAL at 09:05

## 2022-03-29 RX ADMIN — IBUPROFEN 600 MG: 600 TABLET ORAL at 07:55

## 2022-03-29 RX ADMIN — IBUPROFEN 600 MG: 600 TABLET ORAL at 20:16

## 2022-03-30 VITALS
WEIGHT: 149.6 LBS | SYSTOLIC BLOOD PRESSURE: 129 MMHG | BODY MASS INDEX: 26.51 KG/M2 | DIASTOLIC BLOOD PRESSURE: 83 MMHG | OXYGEN SATURATION: 93 % | TEMPERATURE: 98.1 F | RESPIRATION RATE: 16 BRPM | HEART RATE: 75 BPM | HEIGHT: 63 IN

## 2022-03-30 PROBLEM — U07.1 COVID-19 VIRUS INFECTION: Status: ACTIVE | Noted: 2022-03-30

## 2022-03-30 LAB
DEPRECATED RDW RBC AUTO: 50.6 FL (ref 37–54)
ERYTHROCYTE [DISTWIDTH] IN BLOOD BY AUTOMATED COUNT: 20.3 % (ref 12.3–15.4)
HCT VFR BLD AUTO: 30.1 % (ref 34–46.6)
HGB BLD-MCNC: 9.5 G/DL (ref 12–15.9)
MCH RBC QN AUTO: 22.6 PG (ref 26.6–33)
MCHC RBC AUTO-ENTMCNC: 31.6 G/DL (ref 31.5–35.7)
MCV RBC AUTO: 71.5 FL (ref 79–97)
PLATELET # BLD AUTO: 289 10*3/MM3 (ref 140–450)
PMV BLD AUTO: 10.7 FL (ref 6–12)
RBC # BLD AUTO: 4.21 10*6/MM3 (ref 3.77–5.28)
TREPONEMA PALLIDUM IGG+IGM AB [PRESENCE] IN SERUM OR PLASMA BY IMMUNOASSAY: REACTIVE
WBC NRBC COR # BLD: 17.94 10*3/MM3 (ref 3.4–10.8)

## 2022-03-30 PROCEDURE — 63710000001 DIPHENHYDRAMINE PER 50 MG: Performed by: NURSE ANESTHETIST, CERTIFIED REGISTERED

## 2022-03-30 PROCEDURE — 85027 COMPLETE CBC AUTOMATED: CPT | Performed by: OBSTETRICS & GYNECOLOGY

## 2022-03-30 RX ORDER — IBUPROFEN 600 MG/1
600 TABLET ORAL EVERY 8 HOURS PRN
Qty: 30 TABLET | Refills: 0 | Status: SHIPPED | OUTPATIENT
Start: 2022-03-30

## 2022-03-30 RX ORDER — ACETAMINOPHEN 325 MG/1
650 TABLET ORAL EVERY 6 HOURS PRN
Status: DISCONTINUED | OUTPATIENT
Start: 2022-03-30 | End: 2022-03-30 | Stop reason: HOSPADM

## 2022-03-30 RX ORDER — OXYCODONE HYDROCHLORIDE 5 MG/1
5 TABLET ORAL EVERY 4 HOURS PRN
Qty: 18 TABLET | Refills: 0 | Status: SHIPPED | OUTPATIENT
Start: 2022-03-30 | End: 2022-04-02

## 2022-03-30 RX ADMIN — DOCUSATE SODIUM 100 MG: 100 CAPSULE, LIQUID FILLED ORAL at 08:15

## 2022-03-30 RX ADMIN — SIMETHICONE 80 MG: 80 TABLET, CHEWABLE ORAL at 08:15

## 2022-03-30 RX ADMIN — OXYCODONE 10 MG: 5 TABLET ORAL at 02:55

## 2022-03-30 RX ADMIN — DIPHENHYDRAMINE HYDROCHLORIDE 25 MG: 25 CAPSULE ORAL at 02:11

## 2022-03-30 RX ADMIN — OXYCODONE 10 MG: 5 TABLET ORAL at 12:17

## 2022-03-30 RX ADMIN — IBUPROFEN 600 MG: 600 TABLET ORAL at 08:15

## 2022-03-30 RX ADMIN — ACETAMINOPHEN 650 MG: 325 TABLET ORAL at 12:17

## 2022-03-30 RX ADMIN — ACETAMINOPHEN 650 MG: 325 TABLET ORAL at 02:55

## 2022-03-30 RX ADMIN — OXYCODONE 10 MG: 5 TABLET ORAL at 08:20

## 2022-03-30 RX ADMIN — IBUPROFEN 600 MG: 600 TABLET ORAL at 14:25

## 2022-03-30 RX ADMIN — SIMETHICONE 80 MG: 80 TABLET, CHEWABLE ORAL at 12:17

## 2022-03-30 RX ADMIN — IBUPROFEN 600 MG: 600 TABLET ORAL at 02:11

## 2022-04-05 NOTE — PROGRESS NOTES
Continued Stay Note  [facility]     Patient Name: Chiquita Galvez  MRN: [id number]  Today's Date: 4/5/2022    Admit Date: 3/27/2022     Discharge Plan     Row Name 04/05/22 1025       Plan    Plan Infant to discharge home with mother when medically ready. MARI Mayo    Plan Comments Mother: Chiquita Galvez, MRN: [id number]; Infant: Sarah “Anjum” Lenny, MRN: [id number]. CSW reviewed cord toxicology results, which are negative. Referral to CPS is not warranted at this time. CSW will continue to follow to assist with psychosocial needs as infant is in the NICU. MARI Mayo               Discharge Codes    No documentation.               Expected Discharge Date and Time     Expected Discharge Date Expected Discharge Time    Mar 30, 2022             [place] [facility]

## 2022-10-21 ENCOUNTER — APPOINTMENT (OUTPATIENT)
Dept: GENERAL RADIOLOGY | Facility: HOSPITAL | Age: 27
End: 2022-10-21

## 2022-10-21 ENCOUNTER — HOSPITAL ENCOUNTER (EMERGENCY)
Facility: HOSPITAL | Age: 27
Discharge: HOME | End: 2022-10-21
Attending: EMERGENCY MEDICINE | Admitting: EMERGENCY MEDICINE

## 2022-10-21 VITALS
RESPIRATION RATE: 16 BRPM | HEIGHT: 63 IN | SYSTOLIC BLOOD PRESSURE: 108 MMHG | OXYGEN SATURATION: 100 % | WEIGHT: 105 LBS | DIASTOLIC BLOOD PRESSURE: 65 MMHG | TEMPERATURE: 97.6 F | BODY MASS INDEX: 18.61 KG/M2 | HEART RATE: 84 BPM

## 2022-10-21 DIAGNOSIS — F19.90 ILLICIT DRUG USE: ICD-10-CM

## 2022-10-21 DIAGNOSIS — R06.00 DYSPNEA, UNSPECIFIED TYPE: Primary | ICD-10-CM

## 2022-10-21 PROCEDURE — 99284 EMERGENCY DEPT VISIT MOD MDM: CPT

## 2022-10-21 PROCEDURE — 71045 X-RAY EXAM CHEST 1 VIEW: CPT

## 2022-10-21 NOTE — DISCHARGE INSTRUCTIONS
Please follow-up with your PCP as well as [facility] for substance abuse cessation help.    Rest.  Increase fluid intake.      Although you are being discharged in the ED today, I encouraged her to return for worsening symptoms.  Things can, and do, change such a treatment at home with medication may not be adequate.  Specifically I recommend returning for chest pain or discomfort, difficulty breathing, persistent vomiting or difficulty holding down liquids or medications, fever > 102.0 F or any other worsening or alarming symptoms.     Take meds as prescribed.     You have been evaluated in the emergency department for your presenting symptoms and deemed appropriate for discharge home.  Understand that your health care does not end here today.  It is important that your primary care physician be made aware of your visit.  I recommend calling your primary care provider in the next 48 hours to arrange follow-up care.  Your primary care provider needs to review your treatment and recovery to ensure that no further treatment is necessary.  Additional testing or procedures may be necessary as an outpatient at the discretion of your primary care provider.    I also recommend following up with your PCP for recheck of your blood pressure and treatment as needed.

## 2022-10-21 NOTE — ED PROVIDER NOTES
" EMERGENCY DEPARTMENT ENCOUNTER    Room Number:    Date of encounter:  10/21/2022  PCP: Provider, No Known  Historian: Patient  Full history not obtainable due to: None    HPI:  Chief Complaint: SOA    Context: Chiquita Galvez is a 27 y.o. female who presents to the ED c/o shortness of air.  Patient complains of some minor tightness in her upper back with associated dyspnea after smoking marijuana, snorting some ice, taking hydrocodone this morning.  There has been no chest pain, nausea, vomiting, abdominal pain, diarrhea.  She does not feel dizzy and has not passed out.  There is no fever, chills.  She also complains \"I feel like my head is big\".      MEDICAL RECORD REVIEW:    Upon review of the medical record it appears the patient was most recently evaluated earlier this year on 3/27/2022 at which time she gave birth via  delivery.      PAST MEDICAL HISTORY    Active Ambulatory Problems     Diagnosis Date Noted   • Maternal care due to low transverse uterine scar from previous  delivery 2020   • Syphilis of mother during pregnancy 2020   • History of postpartum hemorrhage, currently pregnant 2020   • Iron deficiency 2020   • Iron adverse reaction 2020   • LGSIL on Pap smear of cervix 2020   • History of  labor, current pregnancy, second trimester 2020   • 23 weeks gestation of pregnancy 2021   • Placental abnormality in second trimester 2021   • Marginal insertion of umbilical cord affecting management of mother in second trimester 2022   • Abnormal placenta affecting management of mother in second trimester 2022   • Circumvallate placenta in second trimester 2022   • Poor weight gain of pregnancy, third trimester 2022   • IUGR (intrauterine growth restriction) affecting care of mother, third trimester, fetus 1 2022   • Fetal heart rate decelerations affecting management of mother 2022   • " Decreased fetal movements in third trimester 2022   • Pregnancy 2022   • Anemia affecting pregnancy in third trimester 2022   •  delivery delivered 2022   • COVID-19 virus infection 2022     Resolved Ambulatory Problems     Diagnosis Date Noted   • History of  labor 2020   • Tobacco use during pregnancy, antepartum 2020   • Pregnancy 2020   • Proteinuria affecting pregnancy in third trimester 2020   • Hypokalemia 2020   • Patient desires vaginal birth after  section () 2020   • Anemia affecting seventh pregnancy 2020   • IUGR (intrauterine growth restriction) affecting care of mother, third trimester, fetus 1 2020   • Poor fetal growth affecting management of mother in second trimester 2020   • Non-reassuring fetal status, delivered, current hospitalization 2020   •  delivery delivered 2020   • Nausea/vomiting in pregnancy 2021     Past Medical History:   Diagnosis Date   • History of anemia    • History of transfusion    • History of urinary tract infection    • Migraine    • Syphilis          PAST SURGICAL HISTORY  Past Surgical History:   Procedure Laterality Date   •  SECTION  2017   •  SECTION N/A 3/18/2020    Procedure:  SECTION REPEAT;  Surgeon: Bisi Bermudez MD;  Location: [facility] LABOR DELIVERY;  Service: Obstetrics/Gynecology;  Laterality: N/A;   •  SECTION Bilateral 3/28/2022    Procedure:  SECTION PRIMARY;  Surgeon: Brooks Irwin MD;  Location: [facility] LABOR DELIVERY;  Service: Obstetrics/Gynecology;  Laterality: Bilateral;   • DILATATION AND CURETTAGE  2009    Miscarriage         FAMILY HISTORY  Family History   Problem Relation Age of Onset   • Breast cancer Neg Hx    • Ovarian cancer Neg Hx    • Colon cancer Neg Hx    • Endometrial cancer Neg Hx          SOCIAL HISTORY  Social History     Socioeconomic History   •  Marital status:    • Number of children: 5   Tobacco Use   • Smoking status: Former     Packs/day: 0.50     Types: Cigars, Cigarettes     Quit date: 2019     Years since quittin.9   • Smokeless tobacco: Never   Vaping Use   • Vaping Use: Never used   Substance and Sexual Activity   • Alcohol use: Never   • Drug use: Never   • Sexual activity: Yes     Partners: Male     Birth control/protection: None         ALLERGIES  Patient has no known allergies.        REVIEW OF SYSTEMS    All systems reviewed and marked as negative except as listed in HPI     PHYSICAL EXAM    I have reviewed the triage vital signs and nursing notes.    ED Triage Vitals [10/21/22 1058]   Temp Heart Rate Resp BP SpO2   97.6 °F (36.4 °C) 101 16 127/88 99 %      Temp src Heart Rate Source Patient Position BP Location FiO2 (%)   Tympanic -- -- -- --       Physical Exam  Constitutional:       General: She is not in acute distress.     Appearance: She is well-developed.   HENT:      Head: Normocephalic and atraumatic.   Eyes:      General: No scleral icterus.     Conjunctiva/sclera: Conjunctivae normal.   Neck:      Trachea: No tracheal deviation.   Cardiovascular:      Rate and Rhythm: Normal rate and regular rhythm.   Pulmonary:      Effort: Pulmonary effort is normal.      Breath sounds: Normal breath sounds.   Abdominal:      Palpations: Abdomen is soft.      Tenderness: There is no abdominal tenderness.   Musculoskeletal:         General: No deformity.      Cervical back: Normal range of motion.   Lymphadenopathy:      Cervical: No cervical adenopathy.   Skin:     General: Skin is warm and dry.   Neurological:      Mental Status: She is alert and oriented to person, place, and time.   Psychiatric:         Behavior: Behavior normal.         Vital signs and nursing notes reviewed.            LAB RESULTS  No results found for this or any previous visit (from the past 24 hour(s)).    Ordered the above labs and independently reviewed  the results.        RADIOLOGY  XR Chest 1 View    Result Date: 10/21/2022  XR CHEST 1 VW-  HISTORY: Female who is 27 years-old,  short of breath  TECHNIQUE: Frontal view of the chest  COMPARISON: None available  FINDINGS: Heart, mediastinum and pulmonary vasculature are unremarkable. No focal pulmonary consolidation, pleural effusion, or pneumothorax. Old granulomatous disease is suggested. No acute osseous process.      No evidence for acute pulmonary process. Follow-up as clinical indications persist.  This report was finalized on 10/21/2022 11:50 AM by Dr. Wallace Rincon M.D.        I ordered the above noted radiological studies. Independently reviewed by me and discussed with radiologist.  See dictation above for official radiology interpretation.      PROCEDURES    Procedures        MEDICATIONS GIVEN IN ER    Medications - No data to display      PROGRESS, DATA ANALYSIS, CONSULTS, AND MEDICAL DECISION MAKING    All labs have been independently reviewed by me.  All radiology studies have been reviewed by me.   EKG's independently reviewed by me.  Discussion below represents my analysis of pertinent findings related to patient's condition, differential diagnosis, treatment plan and final disposition.    DIFFERENTIAL DIAGNOSIS INCLUDE BUT NOT LIMITED TO:     Differential diagnosis includes but is not limited to:  -COVID  -CHF  -acute coronary syndrome  -pulmonary embolism  -pneumothorax  -pneumonia  -asthma/COPD  -deconditioning  -anemia  -anxiety          AS OF 12:09 EDT VITALS:    BP - 116/88  HR - 87  TEMP - 97.6 °F (36.4 °C) (Tympanic)  02 SATS - 100%    1211 I rechecked the patient. She appears comfortable.  I discussed the patient's labs, radiology findings (including all incidental findings), diagnosis, and plan for discharge.  A repeat exam reveals no new worrisome changes from my initial exam findings.  The patient understands that the fact that they are being discharged does not denote that nothing  is abnormal, it indicates that no clinical emergency is present and that they must follow-up as directed in order to properly maintain their health.  Follow-up instructions (specifically listed below) and return to ER precautions were given at this time.  I specifically instructed the patient to follow-up with their PCP.  The patient understands and agrees with the plan, and is ready for discharge.  All questions answered.        DIAGNOSIS  Final diagnoses:   Dyspnea, unspecified type   Illicit drug use         DISPOSITION  D/c    Pt masked in first look. I wore a surgical mask throughout my encounters with the pt. I performed hand hygiene on entry into the pt room and upon exit.     Dictated utilizing Dragon dictation     Note Disclaimer: At [facility], we believe that sharing information builds trust and better relationships. You are receiving this note because you recently visited [facility]. It is possible you will see health information before a provider has talked with you about it. This kind of information can be easy to misunderstand. To help you fully understand what it means for your health, we urge you to discuss this note with your provider.      Fadi Sullivan PA  10/21/22 1212

## 2022-10-21 NOTE — ED NOTES
"Pt c/o shortness of breath. States it started at 0800 this morning after taking meth, hydrocodone, and marijuana. Pt c/o of heart beating fast, shaking, and \"head feeling big.\"     This RN wore mask, gloves, eyewear and all other appropriate PPE while performing patient care.    "

## 2022-10-21 NOTE — ED TRIAGE NOTES
Pt arrived via ems from home. Pt reports using  ICE, Weed and 5mg of hydrocodone. Pt reports feeling SOA.     Pt was wearing a mask during assessment.  This RN wore appropriate PPE

## 2022-10-21 NOTE — ED PROVIDER NOTES
MD ATTESTATION NOTE    The KAYLIN and I have discussed this patient's history, physical exam, and treatment plan.  I have reviewed the documentation and personally had a face to face interaction with the patient. I affirm the documentation and agree with the treatment and plan.  The attached note describes my personal findings.      I provided a substantive portion of the care of the patient.  I personally performed the physical exam in its entirety, and below are my findings.  For this patient encounter, the patient wore surgical mask, I wore full protective PPE including N95 and eye protection.      Brief HPI: Patient presents with complaint of shortness of breath and feeling that her head is big.  She reports that she used hydrocodone, smoked marijuana, and also snorted some ice around 8:00 this morning.  She denies chest pain.  She reports a little tightness in her upper back.  She denies fever or chills.  No abdominal pain, nausea, vomiting.    PHYSICAL EXAM  ED Triage Vitals [10/21/22 1058]   Temp Heart Rate Resp BP SpO2   97.6 °F (36.4 °C) 101 16 127/88 99 %      Temp src Heart Rate Source Patient Position BP Location FiO2 (%)   Tympanic -- -- -- --         GENERAL: Awake and alert, no acute distress  HENT: nares patent, oropharynx clear, no pharyngeal edema  EYES: no scleral icterus, pupils 3 mm reactive bilaterally  CV: regular rhythm, normal rate, no murmur  RESPIRATORY: normal effort, lungs clear auscultation bilaterally  ABDOMEN: soft, nondistended, nontender throughout  MUSCULOSKELETAL: no deformity, no calf tenderness, no peripheral edema  NEURO: alert, moves all extremities, follows commands, cranial nerves II through XII gross intact, speech fluent and clear  PSYCH:  calm, cooperative  SKIN: warm, dry    Vital signs and nursing notes reviewed.        Plan: O2 saturation 99% on room air.  Will obtain a chest x-ray to evaluate for possible pneumothorax, no effusion, pneumonia, aspiration.  Anticipate she  can be safely discharged home if this is normal.     Ben Medrano MD  10/21/22 7915

## 2023-06-08 ENCOUNTER — INITIAL PRENATAL (OUTPATIENT)
Dept: OBSTETRICS AND GYNECOLOGY | Age: 28
End: 2023-06-08
Payer: COMMERCIAL

## 2023-06-08 VITALS — SYSTOLIC BLOOD PRESSURE: 106 MMHG | DIASTOLIC BLOOD PRESSURE: 62 MMHG | BODY MASS INDEX: 22.14 KG/M2 | WEIGHT: 125 LBS

## 2023-06-08 DIAGNOSIS — Z87.59 HISTORY OF PRIOR PREGNANCY WITH IUGR NEWBORN: ICD-10-CM

## 2023-06-08 DIAGNOSIS — O98.119 SYPHILIS OF MOTHER DURING PREGNANCY: ICD-10-CM

## 2023-06-08 DIAGNOSIS — Z30.2 REQUEST FOR STERILIZATION: ICD-10-CM

## 2023-06-08 DIAGNOSIS — R87.612 LGSIL ON PAP SMEAR OF CERVIX: ICD-10-CM

## 2023-06-08 DIAGNOSIS — O09.211 CURRENT PREGNANCY WITH HISTORY OF PRE-TERM LABOR IN FIRST TRIMESTER: ICD-10-CM

## 2023-06-08 DIAGNOSIS — Z3A.10 10 WEEKS GESTATION OF PREGNANCY: Primary | ICD-10-CM

## 2023-06-08 DIAGNOSIS — Z32.00 ENCOUNTER FOR CONFIRMATION OF PREGNANCY TEST RESULT WITH PHYSICAL EXAMINATION: ICD-10-CM

## 2023-06-08 DIAGNOSIS — O99.019 IRON DEFICIENCY ANEMIA DURING PREGNANCY: ICD-10-CM

## 2023-06-08 DIAGNOSIS — O21.9 NAUSEA AND VOMITING DURING PREGNANCY: ICD-10-CM

## 2023-06-08 DIAGNOSIS — O09.299 HISTORY OF POSTPARTUM HEMORRHAGE, CURRENTLY PREGNANT: ICD-10-CM

## 2023-06-08 DIAGNOSIS — O34.211 MATERNAL CARE DUE TO LOW TRANSVERSE UTERINE SCAR FROM PREVIOUS CESAREAN DELIVERY: ICD-10-CM

## 2023-06-08 DIAGNOSIS — D50.9 IRON DEFICIENCY ANEMIA DURING PREGNANCY: ICD-10-CM

## 2023-06-08 DIAGNOSIS — Z12.4 SCREENING FOR CERVICAL CANCER: ICD-10-CM

## 2023-06-08 DIAGNOSIS — Z13.89 SCREENING FOR HEMATURIA OR PROTEINURIA: ICD-10-CM

## 2023-06-08 PROBLEM — Z34.90 PREGNANCY: Status: RESOLVED | Noted: 2022-03-28 | Resolved: 2023-06-08

## 2023-06-08 PROBLEM — O99.013 ANEMIA AFFECTING PREGNANCY IN THIRD TRIMESTER: Status: RESOLVED | Noted: 2022-03-28 | Resolved: 2023-06-08

## 2023-06-08 PROBLEM — O36.5931 IUGR (INTRAUTERINE GROWTH RESTRICTION) AFFECTING CARE OF MOTHER, THIRD TRIMESTER, FETUS 1: Status: RESOLVED | Noted: 2022-02-17 | Resolved: 2023-06-08

## 2023-06-08 PROBLEM — O43.192 MARGINAL INSERTION OF UMBILICAL CORD AFFECTING MANAGEMENT OF MOTHER IN SECOND TRIMESTER: Status: RESOLVED | Noted: 2022-01-06 | Resolved: 2023-06-08

## 2023-06-08 PROBLEM — O36.8130 DECREASED FETAL MOVEMENTS IN THIRD TRIMESTER: Status: RESOLVED | Noted: 2022-03-28 | Resolved: 2023-06-08

## 2023-06-08 PROBLEM — U07.1 COVID-19 VIRUS INFECTION: Status: RESOLVED | Noted: 2022-03-30 | Resolved: 2023-06-08

## 2023-06-08 PROBLEM — O43.112 CIRCUMVALLATE PLACENTA IN SECOND TRIMESTER: Status: RESOLVED | Noted: 2022-01-06 | Resolved: 2023-06-08

## 2023-06-08 PROBLEM — O43.92 ABNORMAL PLACENTA AFFECTING MANAGEMENT OF MOTHER IN SECOND TRIMESTER: Status: RESOLVED | Noted: 2022-01-06 | Resolved: 2023-06-08

## 2023-06-08 PROBLEM — O26.13 POOR WEIGHT GAIN OF PREGNANCY, THIRD TRIMESTER: Status: RESOLVED | Noted: 2022-01-06 | Resolved: 2023-06-08

## 2023-06-08 PROBLEM — O36.8390 FETAL HEART RATE DECELERATIONS AFFECTING MANAGEMENT OF MOTHER: Status: RESOLVED | Noted: 2022-03-28 | Resolved: 2023-06-08

## 2023-06-08 PROBLEM — O43.102 PLACENTAL ABNORMALITY IN SECOND TRIMESTER: Status: RESOLVED | Noted: 2021-12-16 | Resolved: 2023-06-08

## 2023-06-08 LAB
CLARITY, POC: CLEAR
COLOR UR: YELLOW
GLUCOSE UR STRIP-MCNC: NEGATIVE MG/DL
PROT UR STRIP-MCNC: NEGATIVE MG/DL

## 2023-06-08 RX ORDER — PNV NO.95/FERROUS FUM/FOLIC AC 28MG-0.8MG
1 TABLET ORAL DAILY
Qty: 30 TABLET | Refills: 11 | Status: SHIPPED | OUTPATIENT
Start: 2023-06-08

## 2023-06-08 RX ORDER — ONDANSETRON 4 MG/1
4 TABLET, FILM COATED ORAL EVERY 4 HOURS PRN
Qty: 30 TABLET | Refills: 11 | Status: SHIPPED | OUTPATIENT
Start: 2023-06-08 | End: 2024-06-07

## 2023-06-08 RX ORDER — FERROUS SULFATE 325(65) MG
325 TABLET ORAL 2 TIMES DAILY
Qty: 60 TABLET | Refills: 11 | Status: SHIPPED | OUTPATIENT
Start: 2023-06-08

## 2023-06-08 NOTE — PROGRESS NOTES
[facility]   Obstetrics and Gynecology   New Obstetric Visit    2023    Patient: Chiquita Galvez          MR#:[id number]    History of Present Illness    Chief Complaint   Patient presents with    Initial Prenatal Visit     11w0d, New OB Preg conf LMP  U/S today, No problems today       27 y.o. female  at 10w5d presents for NOB visit.  She is doing well today.  Taking prenatal vitamins but needs a refill.  Having some nausea and is vomiting today in the office.  She was not really expecting pregnancy and request sterilization after this pregnancy.  She takes care of all 7 children at home.  Father of the first 5 has passed away.  Father of the last 2 and to this pregnancy is still involved.    Studies reviewed:  US Ob Transvaginal (2023 10:24)   ________________________________________  Patient Active Problem List   Diagnosis    Maternal care due to low transverse uterine scar from previous  delivery    History of postpartum hemorrhage, currently pregnant    Iron deficiency    Iron adverse reaction    LGSIL on Pap smear of cervix    Current pregnancy with history of pre-term labor in first trimester    10 weeks gestation of pregnancy    Request for sterilization    History of prior pregnancy with IUGR     Iron deficiency anemia during pregnancy    Nausea and vomiting during pregnancy     OB History          9    Para   7    Term   5       2    AB   1    Living   7         SAB   1    IAB   0    Ectopic   0    Molar   0    Multiple   0    Live Births   7          Obstetric Comments   -10/16: CRL c/w 10+6wga, final MARISA 22  -: normal anatomy but several limited views, abnormal placenta  -: normal anatomy, posterior placenta, DVP 3.8cm, EFW 23%ile, AC 8%ile, CL 4.3cm                 Social History:  H/o syphilis s/p tx, denies h/o HSV  Denies family history of birth defects and genetic disorders    Past Medical History:   Diagnosis Date     History of anemia     History of transfusion     multple blood transfusions during pregnancy due to severe anemia. Iron infusions prior to delivery and preventative blood transfusion after deliveries with all pregnancies.     History of urinary tract infection     Migraine     Syphilis     FTA positive [facility]     Past Surgical History:   Procedure Laterality Date     SECTION  2017     SECTION N/A 3/18/2020    Procedure:  SECTION REPEAT;  Surgeon: Bisi Bermudez MD;  Location: [facility] LABOR DELIVERY;  Service: Obstetrics/Gynecology;  Laterality: N/A;     SECTION Bilateral 3/28/2022    Procedure:  SECTION PRIMARY;  Surgeon: Brooks Irwin MD;  Location: [facility] LABOR DELIVERY;  Service: Obstetrics/Gynecology;  Laterality: Bilateral;    DILATATION AND CURETTAGE      Miscarriage     Social History     Tobacco Use   Smoking Status Former    Packs/day: 0.50    Types: Cigars, Cigarettes    Quit date: 2019    Years since quitting: 3.5    Passive exposure: Never   Smokeless Tobacco Never     Family History   Problem Relation Age of Onset    Lupus Father     Cancer Mother     Breast cancer Neg Hx     Ovarian cancer Neg Hx     Colon cancer Neg Hx     Endometrial cancer Neg Hx     Uterine cancer Neg Hx      Prior to Admission medications    Medication Sig Start Date End Date Taking? Authorizing Provider   PRENATAL GUMMY VITAMIN Chew 1 each Daily.   Yes Alisia Mckeon MD   acetaminophen (TYLENOL) 500 MG tablet Take 500 mg by mouth Every 6 (Six) Hours As Needed for Mild Pain .  Patient not taking: Reported on 2023    ProviderAlisia MD   ferrous sulfate 325 (65 FE) MG tablet Take 1 tablet by mouth 2 (Two) Times a Day. 23   Heather Eckert MD   ibuprofen (ADVIL,MOTRIN) 600 MG tablet Take 1 tablet by mouth Every 8 (Eight) Hours As Needed for Mild Pain . 3/30/22   Bisi Bermudez MD   ondansetron (Zofran) 4 MG  "tablet Take 1 tablet by mouth Every 4 (Four) Hours As Needed for Nausea or Vomiting. 6/8/23 6/7/24  Heather Eckert MD   Prenatal Vit-Fe Fumarate-FA (Prenatal Vitamin) 27-0.8 MG tablet Take 1 tablet by mouth Daily. 6/8/23   Heather Eckert MD   prenatal vitamin (prenatal, CLASSIC, vitamin) tablet Take 1 tablet by mouth Daily.  Patient not taking: Reported on 6/8/2023    ProviderAlisia MD     ________________________________________    The following portions of the patient's history were reviewed and updated as appropriate: allergies, current medications, past family history, past medical history, past social history, past surgical history, and problem list.    Review of Systems   All other systems reviewed and are negative.         Objective     /62   Wt 56.7 kg (125 lb)   LMP 09/26/2022 (Exact Date)   BMI 22.14 kg/m²    BP Readings from Last 3 Encounters:   06/08/23 106/62   10/21/22 108/65   03/30/22 129/83      Wt Readings from Last 3 Encounters:   06/08/23 56.7 kg (125 lb)   10/21/22 47.6 kg (105 lb)   03/27/22 67.9 kg (149 lb 9.6 oz)        BMI: Estimated body mass index is 22.14 kg/m² as calculated from the following:    Height as of 10/21/22: 160 cm (63\").    Weight as of this encounter: 56.7 kg (125 lb).    Physical Exam  Vitals and nursing note reviewed.   Constitutional:       General: She is not in acute distress.     Appearance: Normal appearance.   HENT:      Head: Normocephalic and atraumatic.   Eyes:      Extraocular Movements: Extraocular movements intact.   Cardiovascular:      Rate and Rhythm: Normal rate and regular rhythm.      Pulses: Normal pulses.      Heart sounds: No murmur heard.  Pulmonary:      Effort: Pulmonary effort is normal. No respiratory distress.      Breath sounds: Normal breath sounds.   Chest:      Comments: Declined breast exam due to tenderness  Abdominal:      General: There is no distension.      Palpations: Abdomen is soft. There is no mass.     "  Tenderness: There is no abdominal tenderness.      Comments: Well-healed Pfannenstiel scar   Genitourinary:     General: Normal vulva.      Labia:         Right: No rash or lesion.         Left: No rash or lesion.       Urethra: No prolapse, urethral swelling or urethral lesion.      Vagina: Normal.      Cervix: Normal.      Uterus: Normal.       Adnexa: Right adnexa normal and left adnexa normal.      Comments: Bladder: no masses or tenderness  Perineum/Anus: no masses, lesions, or skin changes  Musculoskeletal:         General: No swelling. Normal range of motion.      Cervical back: Normal range of motion.   Skin:     General: Skin is warm and dry.   Neurological:      General: No focal deficit present.      Mental Status: She is alert and oriented to person, place, and time.   Psychiatric:         Mood and Affect: Mood normal.         Behavior: Behavior normal.         Assessment:  Diagnoses and all orders for this visit:    1. 10 weeks gestation of pregnancy (Primary)  Overview:  -PNL: collect today  -Pap: collect today  -Genetics: screening options reviewed, [facility] pamphlet provided, desires cfDNA/carrier screen today, plan for msAFP > 16wga, plan for anatomy Us at 18-22 wga  -Imm: titers today, tdap > 27wga  -Contraception: tubal  -Birthplan: RCS, bottle  -Care coordination: accepts blood transfusions, no latex allergy, call schedule reviewed      Orders:  -     POC Urinalysis Dipstick  -     Urine Culture - Urine, Urine, Clean Catch  -     OB Panel With HIV  -     Varicella Zoster Antibody, IgG  -     Urine Drug Screen - Urine, Clean Catch  -     IGP,CtNgTv,rfx Apt HPV All  -     Comprehensive Metabolic Panel  -     [facility] Panorama Prenatal Test: Chromosomes 13, 18, 21, X & Y: Triploidy 22Q.11.2 Deletion - Blood, Blood, Venous  -     [facility] Horizon 14 (Pan-Ethnic Standard) - Blood, Blood, Venous    2. Encounter for confirmation of pregnancy test result with physical examination    3. Screening for hematuria  or proteinuria  -     POC Urinalysis Dipstick  -     Urine Culture - Urine, Urine, Clean Catch    4. Screening for cervical cancer  -     IGP,CtNgTv,rfx Apt HPV All    5. Nausea and vomiting during pregnancy  Overview:  -CMP today  -Rx zofran sent    Orders:  -     Comprehensive Metabolic Panel    6. Iron deficiency anemia during pregnancy  Overview:  -H/o severe anemia requiring blood transfusions in prior preg  -Hb electro normal  -CBC and ferritin today  -Rx po iron sent but usually struggles with nausea, will likely need IV iron    Orders:  -     Ferritin    7. LGSIL on Pap smear of cervix  Overview:  -20: LSIL pap, did not get planned colpo postpartum  -21: pap NIL  -repeat pap today      8. Request for sterilization  Overview:  -Desires permanent sterilization with RCS.  Ricardo removing entire length of fallopian tube and the permanence of the procedure.  Understands that there are alternatives like LARCs available.  -History of  birth so we will plan to sign consents around 24 WGA.  May have to update later in pregnancy.      9. Maternal care due to low transverse uterine scar from previous  delivery  Overview:  -prior c/s x 3  -first for NRFS at 34wga, op note reviewed by Dr. Mckeon and noted to be low transverse  -second RCS at term for asymmetric IUGR and fetal decels on Us, also low transverse, op note reviewed  -third RCS at 34wga for NRFS, op note reviewed  -plan for RLTCS      10. History of postpartum hemorrhage, currently pregnant  Overview:  -preg in , received 4 units PRBC   -Abs neg  -PPH precautions at delivery      11. Current pregnancy with history of pre-term labor in first trimester  Overview:  -h/o PTL at 34 weeks, delivered via c/s for non-reassuring fetal status (BPP 4/10)  -subsequent pregnancy and 5 prior all delivered at term  -last delivery at 34wga for NRFS incidentally found in triage, no labor sx      12. Syphilis of mother during pregnancy  Overview:  [facility] see media  ID consult 2020 \"adequate treatment, repeat RPR 6 mo\" > RPR titer stable      13. History of prior pregnancy with IUGR   Overview:  Plan for serial growth Us > 28wga      Other orders  -     Prenatal Vit-Fe Fumarate-FA (Prenatal Vitamin) 27-0.8 MG tablet; Take 1 tablet by mouth Daily.  Dispense: 30 tablet; Refill: 11  -     ferrous sulfate 325 (65 FE) MG tablet; Take 1 tablet by mouth 2 (Two) Times a Day.  Dispense: 60 tablet; Refill: 11  -     ondansetron (Zofran) 4 MG tablet; Take 1 tablet by mouth Every 4 (Four) Hours As Needed for Nausea or Vomiting.  Dispense: 30 tablet; Refill: 11          Plan:  Return in about 4 weeks (around 2023) for Next f/u.      Heather Eckert MD  2023 14:16 EDT    "

## 2023-06-09 LAB
ABO GROUP BLD: ABNORMAL
ALBUMIN SERPL-MCNC: 3.8 G/DL (ref 3.9–5)
ALBUMIN/GLOB SERPL: 1.3 {RATIO} (ref 1.2–2.2)
ALP SERPL-CCNC: 69 IU/L (ref 44–121)
ALT SERPL-CCNC: 12 IU/L (ref 0–32)
AMPHETAMINES UR QL SCN: NEGATIVE NG/ML
AST SERPL-CCNC: 18 IU/L (ref 0–40)
BARBITURATES UR QL SCN: NEGATIVE NG/ML
BASOPHILS # BLD AUTO: 0 X10E3/UL (ref 0–0.2)
BASOPHILS NFR BLD AUTO: 0 %
BENZODIAZ UR QL SCN: NEGATIVE NG/ML
BILIRUB SERPL-MCNC: <0.2 MG/DL (ref 0–1.2)
BLD GP AB SCN SERPL QL: NEGATIVE
BUN SERPL-MCNC: 4 MG/DL (ref 6–20)
BUN/CREAT SERPL: 9 (ref 9–23)
BZE UR QL SCN: NEGATIVE NG/ML
CALCIUM SERPL-MCNC: 8.8 MG/DL (ref 8.7–10.2)
CANNABINOIDS UR QL SCN: POSITIVE NG/ML
CHLORIDE SERPL-SCNC: 101 MMOL/L (ref 96–106)
CO2 SERPL-SCNC: 21 MMOL/L (ref 20–29)
CREAT SERPL-MCNC: 0.46 MG/DL (ref 0.57–1)
CREAT UR-MCNC: 84.1 MG/DL (ref 20–300)
EGFRCR SERPLBLD CKD-EPI 2021: 133 ML/MIN/1.73
EOSINOPHIL # BLD AUTO: 0.1 X10E3/UL (ref 0–0.4)
EOSINOPHIL NFR BLD AUTO: 1 %
ERYTHROCYTE [DISTWIDTH] IN BLOOD BY AUTOMATED COUNT: 15.7 % (ref 11.7–15.4)
FERRITIN SERPL-MCNC: 11 NG/ML (ref 15–150)
GLOBULIN SER CALC-MCNC: 2.9 G/DL (ref 1.5–4.5)
GLUCOSE SERPL-MCNC: 80 MG/DL (ref 70–99)
HBV SURFACE AG SERPL QL IA: NEGATIVE
HCT VFR BLD AUTO: 34.1 % (ref 34–46.6)
HCV IGG SERPL QL IA: NON REACTIVE
HGB BLD-MCNC: 10.6 G/DL (ref 11.1–15.9)
HIV 1+2 AB+HIV1 P24 AG SERPL QL IA: NON REACTIVE
IMM GRANULOCYTES # BLD AUTO: 0.1 X10E3/UL (ref 0–0.1)
IMM GRANULOCYTES NFR BLD AUTO: 1 %
LABORATORY COMMENT REPORT: ABNORMAL
LYMPHOCYTES # BLD AUTO: 2.2 X10E3/UL (ref 0.7–3.1)
LYMPHOCYTES NFR BLD AUTO: 21 %
MCH RBC QN AUTO: 24.8 PG (ref 26.6–33)
MCHC RBC AUTO-ENTMCNC: 31.1 G/DL (ref 31.5–35.7)
MCV RBC AUTO: 80 FL (ref 79–97)
METHADONE UR QL SCN: NEGATIVE NG/ML
MONOCYTES # BLD AUTO: 0.6 X10E3/UL (ref 0.1–0.9)
MONOCYTES NFR BLD AUTO: 6 %
NEUTROPHILS # BLD AUTO: 7.2 X10E3/UL (ref 1.4–7)
NEUTROPHILS NFR BLD AUTO: 71 %
OPIATES UR QL SCN: NEGATIVE NG/ML
OXYCODONE+OXYMORPHONE UR QL SCN: NEGATIVE NG/ML
PCP UR QL: NEGATIVE NG/ML
PH UR: 7.7 [PH] (ref 4.5–8.9)
PLATELET # BLD AUTO: 288 X10E3/UL (ref 150–450)
POTASSIUM SERPL-SCNC: 4.1 MMOL/L (ref 3.5–5.2)
PROPOXYPH UR QL SCN: NEGATIVE NG/ML
PROT SERPL-MCNC: 6.7 G/DL (ref 6–8.5)
RBC # BLD AUTO: 4.28 X10E6/UL (ref 3.77–5.28)
RH BLD: POSITIVE
RPR SER QL: REACTIVE
RPR SER-TITR: ABNORMAL {TITER}
RUBV IGG SERPL IA-ACNC: 7.31 INDEX
SODIUM SERPL-SCNC: 135 MMOL/L (ref 134–144)
TREPONEMA PALLIDUM IGG+IGM AB [PRESENCE] IN SERUM OR PLASMA BY IMMUNOASSAY: REACTIVE
VZV IGG SER IA-ACNC: 1585 INDEX
WBC # BLD AUTO: 10.1 X10E3/UL (ref 3.4–10.8)

## 2023-06-10 LAB
BACTERIA UR CULT: NORMAL
BACTERIA UR CULT: NORMAL

## 2023-06-12 LAB
C TRACH RRNA CVX QL NAA+PROBE: NEGATIVE
CONV .: NORMAL
CYTOLOGIST CVX/VAG CYTO: NORMAL
CYTOLOGY CVX/VAG DOC CYTO: NORMAL
CYTOLOGY CVX/VAG DOC THIN PREP: NORMAL
DX ICD CODE: NORMAL
HIV 1 & 2 AB SER-IMP: NORMAL
N GONORRHOEA RRNA CVX QL NAA+PROBE: NEGATIVE
OTHER STN SPEC: NORMAL
STAT OF ADQ CVX/VAG CYTO-IMP: NORMAL
T VAGINALIS RRNA SPEC QL NAA+PROBE: NEGATIVE

## 2023-06-15 ENCOUNTER — TELEPHONE (OUTPATIENT)
Dept: OBSTETRICS AND GYNECOLOGY | Age: 28
End: 2023-06-15
Payer: COMMERCIAL

## 2023-06-15 LAB
Lab: NEGATIVE
Lab: NORMAL
NTRA ALPHA-THALASSEMIA: NEGATIVE
NTRA BETA-HEMOGLOBINOPATHIES: NEGATIVE
NTRA CANAVAN DISEASE: NEGATIVE
NTRA CYSTIC FIBROSIS: NEGATIVE
NTRA DUCHENNE/BECKER MUSCULAR DYSTROPHY: NEGATIVE
NTRA FAMILIAL DYSAUTONOMIA: NEGATIVE
NTRA FRAGILE X SYNDROME: NEGATIVE
NTRA GALACTOSEMIA: NEGATIVE
NTRA GAUCHER DISEASE: NEGATIVE
NTRA MEDIUM CHAIN ACYL-COA DEHYDROGENASE DEFICIENCY: NEGATIVE
NTRA POLYCYSTIC KIDNEY DISEASE, AUTOSOMAL RECESSIVE: NEGATIVE
NTRA SMITH-LEMLI-OPITZ SYNDROME: NEGATIVE
NTRA SPINAL MUSCULAR ATROPHY: NEGATIVE
NTRA TAY-SACHS DISEASE: NEGATIVE

## 2023-06-15 NOTE — TELEPHONE ENCOUNTER
Caller: Chiquita Galvez    Relationship: Self    Best call back number: [phone]    Caller requesting test results: SELF    What test was performed: LABS- GENDER/ GENETIC     When was the test performed 06/08    Where was the test performed:     Additional notes: OK TO CALLBACK ANYTIME,OK TO LEAVE A

## 2023-06-23 PROBLEM — Z13.79 GENETIC TESTING: Status: ACTIVE | Noted: 2023-06-23

## 2023-07-06 PROBLEM — D50.9 IRON DEFICIENCY ANEMIA DURING PREGNANCY: Status: RESOLVED | Noted: 2023-06-08 | Resolved: 2023-07-06

## 2023-07-06 PROBLEM — O99.019 IRON DEFICIENCY ANEMIA DURING PREGNANCY: Status: RESOLVED | Noted: 2023-06-08 | Resolved: 2023-07-06

## 2023-07-06 PROBLEM — O99.019 IRON DEFICIENCY ANEMIA DURING PREGNANCY: Status: ACTIVE | Noted: 2020-02-24

## 2023-07-06 PROBLEM — Z86.19 H/O SYPHILIS: Status: ACTIVE | Noted: 2023-07-06

## 2023-07-06 PROBLEM — D50.9 IRON DEFICIENCY ANEMIA DURING PREGNANCY: Status: ACTIVE | Noted: 2020-02-24

## 2023-07-24 ENCOUNTER — TELEPHONE (OUTPATIENT)
Dept: OBSTETRICS AND GYNECOLOGY | Age: 28
End: 2023-07-24
Payer: COMMERCIAL

## 2023-07-24 DIAGNOSIS — O26.00 ABNORMAL WEIGHT GAIN IN PREGNANCY: Primary | ICD-10-CM

## 2023-07-24 RX ORDER — ACETAMINOPHEN 500 MG
500 TABLET ORAL EVERY 6 HOURS PRN
Qty: 60 TABLET | Refills: 1 | Status: SHIPPED | OUTPATIENT
Start: 2023-07-24 | End: 2023-07-26 | Stop reason: SDUPTHER

## 2023-07-24 RX ORDER — PNV NO.95/FERROUS FUM/FOLIC AC 28MG-0.8MG
1 TABLET ORAL DAILY
Qty: 30 TABLET | Refills: 11 | Status: SHIPPED | OUTPATIENT
Start: 2023-07-24 | End: 2023-07-26 | Stop reason: SDUPTHER

## 2023-07-24 NOTE — TELEPHONE ENCOUNTER
Please notify patient these have been sent.  I have never ordered Boost before so I am not sure if the rx will work.  I did place an order for motherhood connection so they can connect her with Passport resources like WIC.  They will call her.  Thanks!

## 2023-07-24 NOTE — TELEPHONE ENCOUNTER
Caller: Chiquita Galvez    Relationship to patient: Self    Best call back number: [phone]    Patient is needing: PATIENT IS NEEDING PRESCRIPTION FOR PRENATAL, TYLENOL, AND POSSIBLY ENSURE VANILLA COULD BE SENT TO [facility].

## 2023-07-25 ENCOUNTER — PATIENT OUTREACH (OUTPATIENT)
Dept: LABOR AND DELIVERY | Facility: HOSPITAL | Age: 28
End: 2023-07-25
Payer: COMMERCIAL

## 2023-07-25 ENCOUNTER — REFERRAL TRIAGE (OUTPATIENT)
Dept: LABOR AND DELIVERY | Facility: HOSPITAL | Age: 28
End: 2023-07-25
Payer: COMMERCIAL

## 2023-07-25 NOTE — OUTREACH NOTE
[facility]  Enrollment    Current Estimated Gestational Age: 17w3d    Questions/Answers      Flowsheet Row Responses   Would like to participate? Yes   Date of Intake Visit 07/25/23          [facility]  Intake    Current Estimated Gestational Age: 17w3d    Intake Assessment      Flowsheet Row Responses   Best Method for Contacting Cell   Currently Employed Yes  [part time [employer]]   Able to keep appointments as scheduled Yes   Gender(s) and Name(s) girl   Do you have a dentist? Yes   Dentist Name [facility]   Resources Presently Utilizing: WIC (Women, Infant, Children)   Maternal Warning Signs Provided   Other Education Insurance benefits/Incentives, WIC Benefits, Smoking/Vaping Cessation, Transportation Assistance            Learning Assessment      Flowsheet Row Responses   Relationship Patient   Learner Name Chiquita   Does the learner have any barriers to learning? No Barriers   What is the preferred language of the learner for medical teaching? English   Is an  required? No   How does the learner prefer to learn new concepts? Listening, Demonstration, Pictures/Video          Enrollment completed today. She lives in an apartment with her kids and FOB and reports that transportation is an issue right now as her car is not working. Discussed [employer] and  [employer] and sent info. Also discussed resources for infant supplies and encouraged her to call Passport for car seat and visit [facility]'s [facility]. Also encouraged her to call [facility] to add the pregnancy. She has been nauseated and requested a prescription for Ensure but she needs the prescription resent to the pharmacy near her new apartment so that she can walk there. Dr. Eckert to resend in the morning and I will follow up with patient to make sure she is able to pick it up.     Sofy Lomeli RN  Maternity Nurse Navigator    7/25/2023, 14:47 EDT

## 2023-07-26 ENCOUNTER — PATIENT OUTREACH (OUTPATIENT)
Dept: LABOR AND DELIVERY | Facility: HOSPITAL | Age: 28
End: 2023-07-26
Payer: COMMERCIAL

## 2023-07-26 RX ORDER — ACETAMINOPHEN 500 MG
500 TABLET ORAL EVERY 6 HOURS PRN
Qty: 60 TABLET | Refills: 1 | Status: SHIPPED | OUTPATIENT
Start: 2023-07-26

## 2023-07-26 RX ORDER — PNV NO.95/FERROUS FUM/FOLIC AC 28MG-0.8MG
1 TABLET ORAL DAILY
Qty: 30 TABLET | Refills: 11 | Status: SHIPPED | OUTPATIENT
Start: 2023-07-26

## 2023-07-26 NOTE — OUTREACH NOTE
[facility]  Check-In    Current Estimated Gestational Age: 17w4d      Questions/Answers      Flowsheet Row Responses   Demographics Reviewed Yes   Other Education Insurance benefits/Incentives          Called pharmacy to confirm prescription but they have still not received it. Called pt with update and confirmed pharmacy address. Messaged Dr Eckert to request resend on the prescriptions and will follow up with pharmacy and patient tomorrow.     Sofy Lomeli RN  Maternity Nurse Navigator    7/26/2023, 14:31 EDT

## 2023-07-28 ENCOUNTER — PATIENT OUTREACH (OUTPATIENT)
Dept: LABOR AND DELIVERY | Facility: HOSPITAL | Age: 28
End: 2023-07-28
Payer: COMMERCIAL

## 2023-07-28 NOTE — OUTREACH NOTE
[facility]  Check-In    Current Estimated Gestational Age: 17w6d      Questions/Answers      Flowsheet Row Responses   Best Method for Contacting Cell   Demographics Reviewed Yes   Other Education Insurance benefits/Incentives, WIC Benefits          Called pt to update her on Ensure. The pharmacy still does not have the prescription but pharmacist states that it will likely not be covered by insurance. Called patient to suggest that we try to get it covered through WIC. She will call them today and I will email our hospital WIC rep to get the details.     Sofy Lomeli RN  Maternity Nurse Navigator    7/28/2023, 10:31 EDT

## 2023-07-31 ENCOUNTER — PATIENT OUTREACH (OUTPATIENT)
Dept: LABOR AND DELIVERY | Facility: HOSPITAL | Age: 28
End: 2023-07-31
Payer: COMMERCIAL

## 2023-08-02 ENCOUNTER — PATIENT OUTREACH (OUTPATIENT)
Dept: LABOR AND DELIVERY | Facility: HOSPITAL | Age: 28
End: 2023-08-02
Payer: COMMERCIAL

## 2023-08-03 ENCOUNTER — ROUTINE PRENATAL (OUTPATIENT)
Dept: OBSTETRICS AND GYNECOLOGY | Age: 28
End: 2023-08-03
Payer: COMMERCIAL

## 2023-08-03 VITALS — DIASTOLIC BLOOD PRESSURE: 58 MMHG | BODY MASS INDEX: 23.06 KG/M2 | SYSTOLIC BLOOD PRESSURE: 102 MMHG | WEIGHT: 130.2 LBS

## 2023-08-03 DIAGNOSIS — Z3A.18 18 WEEKS GESTATION OF PREGNANCY: Primary | ICD-10-CM

## 2023-08-03 DIAGNOSIS — O99.019 IRON DEFICIENCY ANEMIA DURING PREGNANCY: ICD-10-CM

## 2023-08-03 DIAGNOSIS — O09.299 HISTORY OF POSTPARTUM HEMORRHAGE, CURRENTLY PREGNANT: ICD-10-CM

## 2023-08-03 DIAGNOSIS — O09.211 CURRENT PREGNANCY WITH HISTORY OF PRE-TERM LABOR IN FIRST TRIMESTER: ICD-10-CM

## 2023-08-03 DIAGNOSIS — D50.9 IRON DEFICIENCY ANEMIA DURING PREGNANCY: ICD-10-CM

## 2023-08-03 DIAGNOSIS — O34.211 MATERNAL CARE DUE TO LOW TRANSVERSE UTERINE SCAR FROM PREVIOUS CESAREAN DELIVERY: ICD-10-CM

## 2023-08-03 DIAGNOSIS — O21.9 NAUSEA AND VOMITING DURING PREGNANCY: ICD-10-CM

## 2023-08-03 DIAGNOSIS — Z87.59 HISTORY OF PRIOR PREGNANCY WITH IUGR NEWBORN: ICD-10-CM

## 2023-08-03 DIAGNOSIS — Z86.19 H/O SYPHILIS: ICD-10-CM

## 2023-08-03 DIAGNOSIS — R87.612 LGSIL ON PAP SMEAR OF CERVIX: ICD-10-CM

## 2023-08-03 DIAGNOSIS — Z30.2 REQUEST FOR STERILIZATION: ICD-10-CM

## 2023-08-03 DIAGNOSIS — Z13.89 SCREENING FOR HEMATURIA OR PROTEINURIA: ICD-10-CM

## 2023-08-03 PROBLEM — Z13.79 GENETIC TESTING: Status: RESOLVED | Noted: 2023-06-23 | Resolved: 2023-08-03

## 2023-08-05 LAB
AFP INTERP SERPL-IMP: NORMAL
AFP INTERP SERPL-IMP: NORMAL
AFP MOM SERPL: 1.19
AFP SERPL-MCNC: 67.3 NG/ML
AGE AT DELIVERY: 30.3 YR
GA METHOD: NORMAL
GA: 18.3 WEEKS
IDDM PATIENT QL: NO
LABORATORY COMMENT REPORT: NORMAL
MULTIPLE PREGNANCY: NO
NEURAL TUBE DEFECT RISK FETUS: NORMAL %
RESULT: NORMAL

## 2023-08-22 ENCOUNTER — PATIENT OUTREACH (OUTPATIENT)
Dept: LABOR AND DELIVERY | Facility: HOSPITAL | Age: 28
End: 2023-08-22
Payer: COMMERCIAL

## 2023-08-22 NOTE — OUTREACH NOTE
[facility]  Check-In    Current Estimated Gestational Age: 21w3d      Questions/Answers      Flowsheet Row Responses   Best Method for Contacting Cell   Demographics Reviewed Yes   Currently Employed Yes   Able to keep appointments as scheduled Yes   Gender(s) and Name(s) girl   Resource/Environmental Concerns [employer], [employer]   Do you have any questions related to your care experience, your pregnancy, plans for delivery, any concerns, etc? No   Other Education WIC Benefits, Transportation Assistance          Checked in with patient about transportation and her WIC appt on Fri. She is headed to work now and will check her work schedule. She will message me if she needs transportation assistance on Thurs or if she needs to reschedule her WIC appt on Fri.     Sofy Lomeli RN  Maternity Nurse Navigator    8/22/2023, 10:11 EDT

## 2023-08-24 ENCOUNTER — PATIENT OUTREACH (OUTPATIENT)
Dept: LABOR AND DELIVERY | Facility: HOSPITAL | Age: 28
End: 2023-08-24
Payer: COMMERCIAL

## 2023-08-24 ENCOUNTER — ROUTINE PRENATAL (OUTPATIENT)
Dept: OBSTETRICS AND GYNECOLOGY | Age: 28
End: 2023-08-24
Payer: COMMERCIAL

## 2023-08-24 VITALS — SYSTOLIC BLOOD PRESSURE: 102 MMHG | BODY MASS INDEX: 24.69 KG/M2 | DIASTOLIC BLOOD PRESSURE: 56 MMHG | WEIGHT: 139.4 LBS

## 2023-08-24 DIAGNOSIS — Z86.19 H/O SYPHILIS: ICD-10-CM

## 2023-08-24 DIAGNOSIS — Z87.59 HISTORY OF PRIOR PREGNANCY WITH IUGR NEWBORN: ICD-10-CM

## 2023-08-24 DIAGNOSIS — O99.019 IRON DEFICIENCY ANEMIA DURING PREGNANCY: ICD-10-CM

## 2023-08-24 DIAGNOSIS — R87.612 LGSIL ON PAP SMEAR OF CERVIX: ICD-10-CM

## 2023-08-24 DIAGNOSIS — Z3A.21 21 WEEKS GESTATION OF PREGNANCY: Primary | ICD-10-CM

## 2023-08-24 DIAGNOSIS — O09.299 HISTORY OF POSTPARTUM HEMORRHAGE, CURRENTLY PREGNANT: ICD-10-CM

## 2023-08-24 DIAGNOSIS — O21.9 NAUSEA AND VOMITING DURING PREGNANCY: ICD-10-CM

## 2023-08-24 DIAGNOSIS — D50.9 IRON DEFICIENCY ANEMIA DURING PREGNANCY: ICD-10-CM

## 2023-08-24 DIAGNOSIS — Z13.89 SCREENING FOR HEMATURIA OR PROTEINURIA: ICD-10-CM

## 2023-08-24 DIAGNOSIS — O34.211 MATERNAL CARE DUE TO LOW TRANSVERSE UTERINE SCAR FROM PREVIOUS CESAREAN DELIVERY: ICD-10-CM

## 2023-08-24 DIAGNOSIS — Z30.2 REQUEST FOR STERILIZATION: ICD-10-CM

## 2023-08-24 NOTE — PROGRESS NOTES
Chiquita Galvez, a 28 y.o.  at 21w5d, presents for OB follow-up.  She is doing well today.  Denies loss of fluid, vaginal bleeding, and contractions.  Endorses fetal movements.    Objective  BP Readings from Last 3 Encounters:   23 102/56   23 102/58   23 110/60      Wt Readings from Last 3 Encounters:   23 63.2 kg (139 lb 6.4 oz)   23 59.1 kg (130 lb 3.2 oz)   23 55.3 kg (122 lb)      Total weight gain this pregnancy: 6.532 kg (14 lb 6.4 oz)    General: Awake, alert, no apparent distress  Respiratory: No increased work of breathing  Abdomen: Fundus soft and nontender  Extremity: Nontender, no edema    A/P  Diagnoses and all orders for this visit:    1. 21 weeks gestation of pregnancy (Primary)  Overview:  -PNL: plan for 1hr GTT/CBC at next visit  -Genetics: cfDNA/carrier/msAFP neg, see cfDNA above, anatomy US normal  -Imm: RI, VI, tdap > 27wga  -Contraception: tubal  -Birthplan: RCS, bottle  -Care coordination: accepts blood transfusions, no latex allergy, call schedule reviewed      Orders:  -     POC Urinalysis Dipstick    2. Screening for hematuria or proteinuria  -     POC Urinalysis Dipstick    3. LGSIL on Pap smear of cervix  Overview:  -20: LSIL pap, did not get planned colpo postpartum  -23: NIL      4. Request for sterilization  Overview:  -Desires permanent sterilization with RCS.  Discussed removing entire length of fallopian tube and the permanence of the procedure.  Understands that there are alternatives like LARCs available.  -History of  birth so we will plan to sign consents around 24 WGA.  May have to update later in pregnancy.      5. Maternal care due to low transverse uterine scar from previous  delivery  Overview:  -prior c/s x 3  -first for NRFS at 34wga, op note reviewed by Dr. Mckeon and noted to be low transverse  -second RCS at term for asymmetric IUGR and fetal decels on Us, also low transverse, op note reviewed  -third  RCS at 34wga for NRFS, op note reviewed  -plan for RLTCS with BTL      6. History of postpartum hemorrhage, currently pregnant  Overview:  -preg in , received 4 units PRBC   -Abs neg  -PPH precautions at delivery      7. History of prior pregnancy with IUGR   Overview:  Plan for serial growth Us > 28wga      8. Nausea and vomiting during pregnancy  Overview:  -Rx zofran sent      9. Iron deficiency anemia during pregnancy  Overview:  -H/o severe anemia requiring blood transfusions in prior preg  -Hb electro normal  -Rx po iron sent but usually struggles with nausea, will likely need IV iron      10. H/O syphilis  Overview:  -H/o Syphilis many yrs ago, s/p tx  -Ab titer stable at 1:1          Labor precautions reviewed  Return in about 5 weeks (around 2023) for F/u with 1hr GTT/CBC.    Heather Eckert MD

## 2023-08-24 NOTE — OUTREACH NOTE
[facility]  Check-In    Current Estimated Gestational Age: 21w5d      Questions/Answers      Flowsheet Row Responses   Best Method for Contacting Cell   Demographics Reviewed Yes   Currently Employed Yes   Able to keep appointments as scheduled Yes   Resource/Environmental Concerns [employer]   Other Education Transportation Assistance          Pt called yesterday to let me know that she would need a ride to her appt today. Called this morning to confirm addresses and lyft rides have been scheduled.     Sofy Lomeli RN  Maternity Nurse Navigator    8/24/2023, 09:38 EDT

## 2023-09-05 ENCOUNTER — APPOINTMENT (OUTPATIENT)
Dept: GENERAL RADIOLOGY | Facility: HOSPITAL | Age: 28
End: 2023-09-05
Payer: COMMERCIAL

## 2023-09-05 ENCOUNTER — HOSPITAL ENCOUNTER (EMERGENCY)
Facility: HOSPITAL | Age: 28
Discharge: LEFT BEFORE DISCHARGE | End: 2023-09-05
Attending: EMERGENCY MEDICINE
Payer: COMMERCIAL

## 2023-09-05 VITALS
WEIGHT: 139 LBS | SYSTOLIC BLOOD PRESSURE: 110 MMHG | RESPIRATION RATE: 16 BRPM | TEMPERATURE: 98.1 F | DIASTOLIC BLOOD PRESSURE: 71 MMHG | HEIGHT: 63 IN | HEART RATE: 103 BPM | OXYGEN SATURATION: 100 % | BODY MASS INDEX: 24.63 KG/M2

## 2023-09-05 DIAGNOSIS — R06.00 DYSPNEA, UNSPECIFIED TYPE: Primary | ICD-10-CM

## 2023-09-05 DIAGNOSIS — R51.9 ACUTE NONINTRACTABLE HEADACHE, UNSPECIFIED HEADACHE TYPE: ICD-10-CM

## 2023-09-05 DIAGNOSIS — N39.0 ACUTE UTI: ICD-10-CM

## 2023-09-05 DIAGNOSIS — M54.50 ACUTE MIDLINE LOW BACK PAIN WITHOUT SCIATICA: ICD-10-CM

## 2023-09-05 DIAGNOSIS — D64.9 ANEMIA, UNSPECIFIED TYPE: ICD-10-CM

## 2023-09-05 DIAGNOSIS — Z3A.23 PREGNANCY WITH 23 COMPLETED WEEKS GESTATION: ICD-10-CM

## 2023-09-05 LAB
ALBUMIN SERPL-MCNC: 3.3 G/DL (ref 3.5–5.2)
ALBUMIN/GLOB SERPL: 1 G/DL
ALP SERPL-CCNC: 77 U/L (ref 39–117)
ALT SERPL W P-5'-P-CCNC: 7 U/L (ref 1–33)
ANION GAP SERPL CALCULATED.3IONS-SCNC: 10 MMOL/L (ref 5–15)
AST SERPL-CCNC: 27 U/L (ref 1–32)
BACTERIA UR QL AUTO: ABNORMAL /HPF
BILIRUB SERPL-MCNC: 0.2 MG/DL (ref 0–1.2)
BILIRUB UR QL STRIP: NEGATIVE
BUN SERPL-MCNC: 5 MG/DL (ref 6–20)
BUN/CREAT SERPL: 10 (ref 7–25)
CALCIUM SPEC-SCNC: 8.5 MG/DL (ref 8.6–10.5)
CHLORIDE SERPL-SCNC: 103 MMOL/L (ref 98–107)
CLARITY UR: CLEAR
CO2 SERPL-SCNC: 22 MMOL/L (ref 22–29)
COLOR UR: YELLOW
CREAT SERPL-MCNC: 0.5 MG/DL (ref 0.57–1)
D DIMER PPP FEU-MCNC: 1.04 MCGFEU/ML (ref 0–0.5)
DACRYOCYTES BLD QL SMEAR: ABNORMAL
DEPRECATED RDW RBC AUTO: 41.5 FL (ref 37–54)
EGFRCR SERPLBLD CKD-EPI 2021: 129.6 ML/MIN/1.73
ERYTHROCYTE [DISTWIDTH] IN BLOOD BY AUTOMATED COUNT: 16.1 % (ref 12.3–15.4)
GLOBULIN UR ELPH-MCNC: 3.2 GM/DL
GLUCOSE SERPL-MCNC: 85 MG/DL (ref 65–99)
GLUCOSE UR STRIP-MCNC: NEGATIVE MG/DL
HCT VFR BLD AUTO: 27.8 % (ref 34–46.6)
HGB BLD-MCNC: 8.6 G/DL (ref 12–15.9)
HGB UR QL STRIP.AUTO: NEGATIVE
HYALINE CASTS UR QL AUTO: ABNORMAL /LPF
HYPOCHROMIA BLD QL: ABNORMAL
KETONES UR QL STRIP: NEGATIVE
LEUKOCYTE ESTERASE UR QL STRIP.AUTO: ABNORMAL
LYMPHOCYTES # BLD MANUAL: 2.15 10*3/MM3 (ref 0.7–3.1)
LYMPHOCYTES NFR BLD MANUAL: 3 % (ref 5–12)
MCH RBC QN AUTO: 22.3 PG (ref 26.6–33)
MCHC RBC AUTO-ENTMCNC: 30.9 G/DL (ref 31.5–35.7)
MCV RBC AUTO: 72.2 FL (ref 79–97)
MONOCYTES # BLD: 0.27 10*3/MM3 (ref 0.1–0.9)
NEUTROPHILS # BLD AUTO: 6.55 10*3/MM3 (ref 1.7–7)
NEUTROPHILS NFR BLD MANUAL: 73 % (ref 42.7–76)
NITRITE UR QL STRIP: NEGATIVE
NRBC BLD AUTO-RTO: 0 /100 WBC (ref 0–0.2)
NT-PROBNP SERPL-MCNC: <36 PG/ML (ref 0–450)
PH UR STRIP.AUTO: 7 [PH] (ref 5–8)
PLAT MORPH BLD: NORMAL
PLATELET # BLD AUTO: 284 10*3/MM3 (ref 140–450)
PMV BLD AUTO: 10.4 FL (ref 6–12)
POTASSIUM SERPL-SCNC: 3.9 MMOL/L (ref 3.5–5.2)
PROT SERPL-MCNC: 6.5 G/DL (ref 6–8.5)
PROT UR QL STRIP: NEGATIVE
QT INTERVAL: 357 MS
QTC INTERVAL: 427 MS
RBC # BLD AUTO: 3.85 10*6/MM3 (ref 3.77–5.28)
RBC # UR STRIP: ABNORMAL /HPF
REF LAB TEST METHOD: ABNORMAL
SARS-COV-2 RNA RESP QL NAA+PROBE: NOT DETECTED
SODIUM SERPL-SCNC: 135 MMOL/L (ref 136–145)
SP GR UR STRIP: 1.01 (ref 1–1.03)
SQUAMOUS #/AREA URNS HPF: ABNORMAL /HPF
TROPONIN T SERPL HS-MCNC: <6 NG/L
UROBILINOGEN UR QL STRIP: ABNORMAL
VARIANT LYMPHS NFR BLD MANUAL: 24 % (ref 19.6–45.3)
WBC # UR STRIP: ABNORMAL /HPF
WBC MORPH BLD: NORMAL
WBC NRBC COR # BLD: 8.97 10*3/MM3 (ref 3.4–10.8)

## 2023-09-05 PROCEDURE — 85025 COMPLETE CBC W/AUTO DIFF WBC: CPT | Performed by: PHYSICIAN ASSISTANT

## 2023-09-05 PROCEDURE — 83880 ASSAY OF NATRIURETIC PEPTIDE: CPT | Performed by: PHYSICIAN ASSISTANT

## 2023-09-05 PROCEDURE — 87635 SARS-COV-2 COVID-19 AMP PRB: CPT | Performed by: PHYSICIAN ASSISTANT

## 2023-09-05 PROCEDURE — 71045 X-RAY EXAM CHEST 1 VIEW: CPT

## 2023-09-05 PROCEDURE — 99284 EMERGENCY DEPT VISIT MOD MDM: CPT

## 2023-09-05 PROCEDURE — 85379 FIBRIN DEGRADATION QUANT: CPT | Performed by: PHYSICIAN ASSISTANT

## 2023-09-05 PROCEDURE — 93005 ELECTROCARDIOGRAM TRACING: CPT | Performed by: PHYSICIAN ASSISTANT

## 2023-09-05 PROCEDURE — 84484 ASSAY OF TROPONIN QUANT: CPT | Performed by: PHYSICIAN ASSISTANT

## 2023-09-05 PROCEDURE — 85007 BL SMEAR W/DIFF WBC COUNT: CPT | Performed by: PHYSICIAN ASSISTANT

## 2023-09-05 PROCEDURE — 80053 COMPREHEN METABOLIC PANEL: CPT | Performed by: PHYSICIAN ASSISTANT

## 2023-09-05 PROCEDURE — 81001 URINALYSIS AUTO W/SCOPE: CPT | Performed by: PHYSICIAN ASSISTANT

## 2023-09-05 PROCEDURE — 93010 ELECTROCARDIOGRAM REPORT: CPT | Performed by: INTERNAL MEDICINE

## 2023-09-05 RX ORDER — CEPHALEXIN 500 MG/1
500 CAPSULE ORAL 3 TIMES DAILY
Qty: 21 CAPSULE | Refills: 0 | Status: SHIPPED | OUTPATIENT
Start: 2023-09-05

## 2023-09-05 RX ORDER — CEPHALEXIN 500 MG/1
500 CAPSULE ORAL 3 TIMES DAILY
Qty: 21 CAPSULE | Refills: 0 | Status: SHIPPED | OUTPATIENT
Start: 2023-09-05 | End: 2023-09-05 | Stop reason: SDUPTHER

## 2023-09-05 RX ORDER — SODIUM CHLORIDE 0.9 % (FLUSH) 0.9 %
10 SYRINGE (ML) INJECTION AS NEEDED
Status: DISCONTINUED | OUTPATIENT
Start: 2023-09-05 | End: 2023-09-05 | Stop reason: HOSPADM

## 2023-09-05 NOTE — ED PROVIDER NOTES
MD ATTESTATION NOTE    The KAYLIN and I have discussed this patient's history, physical exam, and treatment plan.  I have reviewed the documentation and personally had a face to face interaction with the patient. I affirm the documentation and agree with the treatment and plan.  The attached note describes my personal findings.      I provided a substantive portion of the care of the patient.  I personally performed the physical exam in its entirety, and below are my findings.      Brief HPI: Patient complains of headache, back pain, and shortness of breath since this morning.  She is currently 23 weeks pregnant.  Denies fever, chills, chest pain, abdominal pain, vaginal bleeding, or leg pain.  Denies history of PE/DVT.    PHYSICAL EXAM  ED Triage Vitals [09/05/23 1153]   Temp Heart Rate Resp BP SpO2   98.1 °F (36.7 °C) 99 16 104/65 100 %      Temp src Heart Rate Source Patient Position BP Location FiO2 (%)   Tympanic Monitor -- -- --         GENERAL: Awake, alert, oriented x3.  Well-developed, well-nourished gravid female.  Resting comfortably in no acute distress  HENT: nares patent, oropharynx is benign  EYES: no scleral icterus  CV: regular rhythm, normal rate  RESPIRATORY: normal effort, clear to auscultation bilaterally  ABDOMEN: soft, gravid, nontender, no CVA tenderness  MUSCULOSKELETAL: Neck is supple.  Extremities are nontender.  No calf tenderness.  No vertebral tenderness.  NEURO: alert, moves all extremities, follows commands  PSYCH:  calm, cooperative  SKIN: warm, dry    Vital signs and nursing notes reviewed.        Plan: Obtain labs, EKG, and chest x-ray.    I personally reviewed the patient's EKG and agree with Jersey's interpretation.    D-dimer was elevated.  Per years criteria, CTA chest was recommended.  Unfortunately, patient had to leave the ED to go  her children as she could not arrange for childcare.  She signed out AMA.    ED Course as of 09/05/23 1512   Tue Sep 05, 2023   1210 Patient  presents with multiple complaints of headache, low back pain, shortness of breath, hot flashes.  She is 23 weeks pregnant.  Differential diagnoses include but not limited to pneumonia, viral URI, PE.  Patient has stable vitals at this time. [EE]   1224 EKG independently interpreted myself.  Time 1213.  Sinus rhythm, 86 bpm.  Normal P/JANEEN.  QRS normal with normal axis.  No significant ST abnormalities.  Similar to prior EKG from 2/20/2020. [EE]   1257 Hemoglobin(!): 8.6  This was 9.6, 1 year ago [EE]   1257 Chest x-ray independently interpreted myself shows no evidence of pneumothorax or infiltrate. [EE]   1257 HS Troponin T: <6 [EE]   1257 proBNP: <36.0 [EE]   1258 Hemoglobin(!): 8.6  10.6 two months ago [WH]   1328 COVID19: Not Detected [EE]   1343 Elevated D-dimer.  I discussed patient with Dr. Eckert, patient's OB doctor.  She agrees with plan for CTA. [EE]   1414 Bacteria, UA(!): 1+  Patient denies any dysuria however does have low back pain and is pregnant.  We will culture urine and put her on Keflex. [EE]   1416 At this time the patient tells me she must leave to go  her children.  I explained that it I would like her to stay and get a CTA of her chest to rule out possible PE.  She states she is unable to find a ride for her children at this time.  I tried in many ways to get her stay.  She understands risks of leaving including death.  We will have her sign out AMA.  She states she will try to come back later for CTA. [EE]      ED Course User Index  [EE] Jersey Gray PA  [WH] Carlos Martines MD Holland, William D, MD  09/05/23 6724

## 2023-09-05 NOTE — ED PROVIDER NOTES
EMERGENCY DEPARTMENT ENCOUNTER    Room Number:    Date of encounter:  2023  PCP: Provider, No Known  Historian: Patient  Chronic or social conditions impacting care (social determinants of health): Nothing    HPI:  Chief Complaint: Multiple  A complete HPI/ROS/PMH/PSH/SH/FH are unobtainable due to: Nothing    Context: Chiquita Galvez is a 28 y.o. female who presents to the ED c/o multiple complaints since this morning.  Patient is approximately 23 weeks pregnant.  She states when she woke up this morning she noticed a global headache, low back pain, shortness of breath, hot flashes.  She denies any overt fever, chest pain, abdominal pain, vaginal bleeding or discharge.  She denies any ill contacts.  She denies any leg pain or swelling.    Review of prior external notes (non-ED):   I reviewed OB office visit from 2023.  Patient being followed for normal prenatal care.    Review of prior external test results outside of this encounter:  I reviewed CMP from 2023.  Creatinine 0.46, potassium 4.1.    PAST MEDICAL HISTORY  Active Ambulatory Problems     Diagnosis Date Noted   • Maternal care due to low transverse uterine scar from previous  delivery 2020   • History of postpartum hemorrhage, currently pregnant 2020   • Iron deficiency anemia during pregnancy 2020   • Iron adverse reaction 2020   • LGSIL on Pap smear of cervix 2020   • Current pregnancy with history of pre-term labor in second trimester 2020   • 21 weeks gestation of pregnancy 2021   • Request for sterilization 2023   • History of prior pregnancy with IUGR  2023   • Nausea and vomiting during pregnancy 2023   • H/O syphilis 2023     Resolved Ambulatory Problems     Diagnosis Date Noted   • Syphilis of mother during pregnancy 2020   • History of  labor 2020   • Tobacco use during pregnancy, antepartum 2020   • Pregnancy 2020   •  Proteinuria affecting pregnancy in third trimester 2020   • Hypokalemia 2020   • Patient desires vaginal birth after  section () 2020   • Anemia affecting seventh pregnancy 2020   • IUGR (intrauterine growth restriction) affecting care of mother, third trimester, fetus 1 2020   • Poor fetal growth affecting management of mother in second trimester 2020   • Non-reassuring fetal status, delivered, current hospitalization 2020   •  delivery delivered 2020   • Nausea/vomiting in pregnancy 2021   • Placental abnormality in second trimester 2021   • Marginal insertion of umbilical cord affecting management of mother in second trimester 2022   • Abnormal placenta affecting management of mother in second trimester 2022   • Circumvallate placenta in second trimester 2022   • Poor weight gain of pregnancy, third trimester 2022   • IUGR (intrauterine growth restriction) affecting care of mother, third trimester, fetus 1 2022   • Fetal heart rate decelerations affecting management of mother 2022   • Decreased fetal movements in third trimester 2022   • Pregnancy 2022   • Anemia affecting pregnancy in third trimester 2022   •  delivery delivered 2022   • COVID-19 virus infection 2022   • Iron deficiency anemia during pregnancy 2023   • Genetic testing 2023     Past Medical History:   Diagnosis Date   • History of anemia    • History of transfusion    • History of urinary tract infection    • Migraine    • Syphilis          PAST SURGICAL HISTORY  Past Surgical History:   Procedure Laterality Date   •  SECTION  2017   •  SECTION N/A 3/18/2020    Procedure:  SECTION REPEAT;  Surgeon: Bisi Bermudez MD;  Location: [facility] DELIVERY;  Service: Obstetrics/Gynecology;  Laterality: N/A;   •  SECTION Bilateral 3/28/2022     Procedure:  SECTION PRIMARY;  Surgeon: Brooks Irwin MD;  Location: [facility] LABOR DELIVERY;  Service: Obstetrics/Gynecology;  Laterality: Bilateral;   • DILATATION AND CURETTAGE  2009    Miscarriage         FAMILY HISTORY  Family History   Problem Relation Age of Onset   • Lupus Father    • Cancer Mother    • Breast cancer Neg Hx    • Ovarian cancer Neg Hx    • Colon cancer Neg Hx    • Endometrial cancer Neg Hx    • Uterine cancer Neg Hx          SOCIAL HISTORY  Social History     Socioeconomic History   • Marital status:    • Number of children: 5   Tobacco Use   • Smoking status: Former     Packs/day: 0.50     Types: Cigars, Cigarettes     Quit date: 2019     Years since quitting: 3.8     Passive exposure: Never   • Smokeless tobacco: Never   Vaping Use   • Vaping Use: Never used   Substance and Sexual Activity   • Alcohol use: Never   • Drug use: Yes     Frequency: 1.0 times per week     Types: Marijuana   • Sexual activity: Yes     Partners: Male     Birth control/protection: None         ALLERGIES  Patient has no known allergies.        REVIEW OF SYSTEMS  All systems reviewed and negative except for those discussed in HPI.       PHYSICAL EXAM    I have reviewed the triage vital signs and nursing notes.    ED Triage Vitals [23 1153]   Temp Heart Rate Resp BP SpO2   98.1 °F (36.7 °C) 99 16 104/65 100 %      Temp src Heart Rate Source Patient Position BP Location FiO2 (%)   Tympanic Monitor -- -- --       Physical Exam  GENERAL: Alert, oriented, nontoxic-appearing, not distressed  HENT: head atraumatic, no nuchal rigidity  EYES: no scleral icterus, EOMI  CV: regular rhythm, regular rate, no murmur  RESPIRATORY: normal effort, CTA  ABDOMEN: soft, gravid  MUSCULOSKELETAL: no deformity, FROM, no calf swelling or tenderness  NEURO: alert, moves all extremities, follows commands  SKIN: warm, dry        LAB RESULTS  Recent Results (from the past 24 hour(s))   Comprehensive Metabolic Panel     Collection Time: 09/05/23 12:12 PM    Specimen: Blood   Result Value Ref Range    Glucose 85 65 - 99 mg/dL    BUN 5 (L) 6 - 20 mg/dL    Creatinine 0.50 (L) 0.57 - 1.00 mg/dL    Sodium 135 (L) 136 - 145 mmol/L    Potassium 3.9 3.5 - 5.2 mmol/L    Chloride 103 98 - 107 mmol/L    CO2 22.0 22.0 - 29.0 mmol/L    Calcium 8.5 (L) 8.6 - 10.5 mg/dL    Total Protein 6.5 6.0 - 8.5 g/dL    Albumin 3.3 (L) 3.5 - 5.2 g/dL    ALT (SGPT) 7 1 - 33 U/L    AST (SGOT) 27 1 - 32 U/L    Alkaline Phosphatase 77 39 - 117 U/L    Total Bilirubin 0.2 0.0 - 1.2 mg/dL    Globulin 3.2 gm/dL    A/G Ratio 1.0 g/dL    BUN/Creatinine Ratio 10.0 7.0 - 25.0    Anion Gap 10.0 5.0 - 15.0 mmol/L    eGFR 129.6 >60.0 mL/min/1.73   Single High Sensitivity Troponin T    Collection Time: 09/05/23 12:12 PM    Specimen: Blood   Result Value Ref Range    HS Troponin T <6 <10 ng/L   BNP    Collection Time: 09/05/23 12:12 PM    Specimen: Blood   Result Value Ref Range    proBNP <36.0 0.0 - 450.0 pg/mL   D-dimer, Quantitative    Collection Time: 09/05/23 12:12 PM    Specimen: Blood   Result Value Ref Range    D-Dimer, Quantitative 1.04 (H) 0.00 - 0.50 MCGFEU/mL   CBC Auto Differential    Collection Time: 09/05/23 12:12 PM    Specimen: Blood   Result Value Ref Range    WBC 8.97 3.40 - 10.80 10*3/mm3    RBC 3.85 3.77 - 5.28 10*6/mm3    Hemoglobin 8.6 (L) 12.0 - 15.9 g/dL    Hematocrit 27.8 (L) 34.0 - 46.6 %    MCV 72.2 (L) 79.0 - 97.0 fL    MCH 22.3 (L) 26.6 - 33.0 pg    MCHC 30.9 (L) 31.5 - 35.7 g/dL    RDW 16.1 (H) 12.3 - 15.4 %    RDW-SD 41.5 37.0 - 54.0 fl    MPV 10.4 6.0 - 12.0 fL    Platelets 284 140 - 450 10*3/mm3    nRBC 0.0 0.0 - 0.2 /100 WBC   Manual Differential    Collection Time: 09/05/23 12:12 PM    Specimen: Blood   Result Value Ref Range    Neutrophil % 73.0 42.7 - 76.0 %    Lymphocyte % 24.0 19.6 - 45.3 %    Monocyte % 3.0 (L) 5.0 - 12.0 %    Neutrophils Absolute 6.55 1.70 - 7.00 10*3/mm3    Lymphocytes Absolute 2.15 0.70 - 3.10 10*3/mm3    Monocytes  Absolute 0.27 0.10 - 0.90 10*3/mm3    Dacrocytes Slight/1+ None Seen    Hypochromia Mod/2+ None Seen    WBC Morphology Normal Normal    Platelet Morphology Normal Normal   ECG 12 Lead Dyspnea    Collection Time: 09/05/23 12:13 PM   Result Value Ref Range    QT Interval 357 ms    QTC Interval 427 ms   COVID-19,[facility] IN-HOUSE CEPHEID/OLIVIA NP SWAB IN TRANSPORT MEDIA 8-12 HR TAT - Swab, Nasopharynx    Collection Time: 09/05/23 12:14 PM    Specimen: Nasopharynx; Swab   Result Value Ref Range    COVID19 Not Detected Not Detected - Ref. Range   Urinalysis With Microscopic If Indicated (No Culture) - Urine, Clean Catch    Collection Time: 09/05/23  1:47 PM    Specimen: Urine, Clean Catch   Result Value Ref Range    Color, UA Yellow Yellow, Straw    Appearance, UA Clear Clear    pH, UA 7.0 5.0 - 8.0    Specific Gravity, UA 1.006 1.005 - 1.030    Glucose, UA Negative Negative    Ketones, UA Negative Negative    Bilirubin, UA Negative Negative    Blood, UA Negative Negative    Protein, UA Negative Negative    Leuk Esterase, UA Moderate (2+) (A) Negative    Nitrite, UA Negative Negative    Urobilinogen, UA 1.0 E.U./dL 0.2 - 1.0 E.U./dL   Urinalysis, Microscopic Only - Urine, Clean Catch    Collection Time: 09/05/23  1:47 PM    Specimen: Urine, Clean Catch   Result Value Ref Range    RBC, UA 0-2 None Seen, 0-2 /HPF    WBC, UA 21-30 (A) None Seen, 0-2 /HPF    Bacteria, UA 1+ (A) None Seen /HPF    Squamous Epithelial Cells, UA 3-6 (A) None Seen, 0-2 /HPF    Hyaline Casts, UA 0-2 None Seen /LPF    Methodology Automated Microscopy        Ordered the above labs and independently reviewed the results.        RADIOLOGY  XR Chest 1 View    Result Date: 9/5/2023  EXAM: XR CHEST 1 VW-  COMPARISON: 10/21/2022  INDICATION: Shortness of breath      Cardiac silhouette is enlarged from prior exam. Consider echocardiographic correlation. No focal consolidation. No pleural effusion or pneumothorax. No focal osseous abnormality.    This report  was finalized on 9/5/2023 1:03 PM by Dr. Adrian Avalos M.D.       I ordered the above noted radiological studies. Reviewed by me and discussed with radiologist.  See dictation for official radiology interpretation.      MEDICATIONS GIVEN IN ER    Medications   sodium chloride 0.9 % flush 10 mL (has no administration in time range)         ADDITIONAL ORDERS CONSIDERED BUT NOT ORDERED:  Nothing      PROGRESS, DATA ANALYSIS, CONSULTS, AND MEDICAL DECISION MAKING    All labs have been independently interpreted by myself.  All radiology studies have been independently interpreted by myself and discussed with radiologist dictating the report.   EKG's independently interpreted by myself.  Discussion below represents my analysis of pertinent findings related to patient's condition, differential diagnosis, treatment plan and final disposition.    I have discussed case with Dr. Martines, emergency room physician.  He has performed his own bedside examination and agrees with treatment plan.    ED Course as of 09/05/23 1511   Tue Sep 05, 2023   1210 Patient presents with multiple complaints of headache, low back pain, shortness of breath, hot flashes.  She is 23 weeks pregnant.  Differential diagnoses include but not limited to pneumonia, viral URI, PE.  Patient has stable vitals at this time. [EE]   1224 EKG independently interpreted myself.  Time 1213.  Sinus rhythm, 86 bpm.  Normal P/JANEEN.  QRS normal with normal axis.  No significant ST abnormalities.  Similar to prior EKG from 2/20/2020. [EE]   1257 Hemoglobin(!): 8.6  This was 9.6, 1 year ago [EE]   1257 Chest x-ray independently interpreted myself shows no evidence of pneumothorax or infiltrate. [EE]   1257 HS Troponin T: <6 [EE]   1257 proBNP: <36.0 [EE]   1258 Hemoglobin(!): 8.6  10.6 two months ago [WH]   1328 COVID19: Not Detected [EE]   1343 Elevated D-dimer.  I discussed patient with Dr. Eckert, patient's OB doctor.  She agrees with plan for CTA. [EE]   1414  Bacteria, UA(!): 1+  Patient denies any dysuria however does have low back pain and is pregnant.  We will culture urine and put her on Keflex. [EE]   1416 At this time the patient tells me she must leave to go  her children.  I explained that it I would like her to stay and get a CTA of her chest to rule out possible PE.  She states she is unable to find a ride for her children at this time.  I tried in many ways to get her stay.  She understands risks of leaving including death.  We will have her sign out AMA.  She states she will try to come back later for CTA. [EE]      ED Course User Index  [EE] Jersey Gray PA  [WH] Carlos Martines MD       AS OF 15:11 EDT VITALS:    BP - 110/71  HR - 103  TEMP - 98.1 °F (36.7 °C) (Tympanic)  O2 SATS - 100%        DIAGNOSIS  Final diagnoses:   Dyspnea, unspecified type   Acute nonintractable headache, unspecified headache type   Acute midline low back pain without sciatica   Pregnancy with 23 completed weeks gestation   Acute UTI   Anemia, unspecified type         DISPOSITION  AMA      Dictated utilizing Dragon dictation     Jersey Gray PA  09/05/23 0788

## 2023-09-05 NOTE — ED NOTES
Pt is 24 weeks pregnant and reports soa (sats 100%), lower back pain and HA.  OB hospitalist requests  pt be seen in ER to r/o soa

## 2023-09-19 ENCOUNTER — PATIENT OUTREACH (OUTPATIENT)
Dept: LABOR AND DELIVERY | Facility: HOSPITAL | Age: 28
End: 2023-09-19
Payer: COMMERCIAL

## 2023-09-19 NOTE — OUTREACH NOTE
[facility]  Check-In    Current Estimated Gestational Age: 25w3d      Questions/Answers      Flowsheet Row Responses   Best Method for Contacting Cell   Demographics Reviewed Yes   Currently Employed Yes   Able to keep appointments as scheduled Yes   Gender(s) and Name(s) girl   Baby Active/Feeling Fetal Movemen Yes   How are you presently feeling? ok, N/V still on and off, some dizziness   Questions regarding prenatal visits or tests to be ordered? No   Resource/Environmental Concerns Reliable Transportation   Do you have any questions related to your care experience, your pregnancy, plans for delivery, any concerns, etc? No   Other Education [facility] Benefits, Transportation Assistance, Insurance benefits/Incentives          Pt reports she is still having N/V intermittently and some dizziness. She missed her [facility] appt last month but will call to reschedule. She is working on infant supplies, should have a bassinet and car seat soon and will visit [facility] for diapers and clothes, address sent again. She is needing transportation to her next appt, lyft rides scheduled. She reports active fetal movement. F/u in one month.     Sofy Lomeli RN  Maternity Nurse Navigator    9/19/2023, 11:32 EDT

## 2023-09-27 ENCOUNTER — ROUTINE PRENATAL (OUTPATIENT)
Dept: OBSTETRICS AND GYNECOLOGY | Age: 28
End: 2023-09-27
Payer: COMMERCIAL

## 2023-09-27 VITALS — WEIGHT: 137 LBS | SYSTOLIC BLOOD PRESSURE: 112 MMHG | BODY MASS INDEX: 24.27 KG/M2 | DIASTOLIC BLOOD PRESSURE: 70 MMHG

## 2023-09-27 DIAGNOSIS — Z3A.26 26 WEEKS GESTATION OF PREGNANCY: Primary | ICD-10-CM

## 2023-09-27 DIAGNOSIS — O34.211 MATERNAL CARE DUE TO LOW TRANSVERSE UTERINE SCAR FROM PREVIOUS CESAREAN DELIVERY: ICD-10-CM

## 2023-09-27 DIAGNOSIS — O09.299 HISTORY OF POSTPARTUM HEMORRHAGE, CURRENTLY PREGNANT: ICD-10-CM

## 2023-09-27 DIAGNOSIS — O99.019 IRON DEFICIENCY ANEMIA DURING PREGNANCY: ICD-10-CM

## 2023-09-27 DIAGNOSIS — O09.212 CURRENT PREGNANCY WITH HISTORY OF PRE-TERM LABOR IN SECOND TRIMESTER: ICD-10-CM

## 2023-09-27 DIAGNOSIS — Z13.1 SCREENING FOR DIABETES MELLITUS: ICD-10-CM

## 2023-09-27 DIAGNOSIS — D50.9 IRON DEFICIENCY ANEMIA DURING PREGNANCY: ICD-10-CM

## 2023-09-27 DIAGNOSIS — Z30.2 REQUEST FOR STERILIZATION: ICD-10-CM

## 2023-09-27 DIAGNOSIS — Z87.59 HISTORY OF PRIOR PREGNANCY WITH IUGR NEWBORN: ICD-10-CM

## 2023-09-27 DIAGNOSIS — O99.019 MATERNAL ANEMIA IN PREGNANCY, ANTEPARTUM: Primary | ICD-10-CM

## 2023-09-27 DIAGNOSIS — O21.9 NAUSEA AND VOMITING DURING PREGNANCY: ICD-10-CM

## 2023-09-27 RX ORDER — ACETAMINOPHEN 325 MG/1
650 TABLET ORAL ONCE
OUTPATIENT
Start: 2023-09-27

## 2023-09-27 RX ORDER — FAMOTIDINE 10 MG/ML
20 INJECTION, SOLUTION INTRAVENOUS ONCE
OUTPATIENT
Start: 2023-09-27

## 2023-09-27 RX ORDER — DIPHENHYDRAMINE HCL 25 MG
25 CAPSULE ORAL ONCE
OUTPATIENT
Start: 2023-09-27

## 2023-09-27 RX ORDER — SODIUM CHLORIDE 9 MG/ML
250 INJECTION, SOLUTION INTRAVENOUS ONCE
OUTPATIENT
Start: 2023-09-27

## 2023-09-27 RX ORDER — DIPHENHYDRAMINE HYDROCHLORIDE 50 MG/ML
25 INJECTION INTRAMUSCULAR; INTRAVENOUS ONCE
OUTPATIENT
Start: 2023-09-27

## 2023-09-27 RX ORDER — FAMOTIDINE 20 MG/1
20 TABLET, FILM COATED ORAL ONCE
OUTPATIENT
Start: 2023-09-27

## 2023-09-27 RX ORDER — DIPHENHYDRAMINE HYDROCHLORIDE 50 MG/ML
50 INJECTION INTRAMUSCULAR; INTRAVENOUS AS NEEDED
OUTPATIENT
Start: 2023-09-27

## 2023-09-27 RX ORDER — FAMOTIDINE 10 MG/ML
20 INJECTION, SOLUTION INTRAVENOUS AS NEEDED
OUTPATIENT
Start: 2023-09-27

## 2023-09-27 NOTE — PROGRESS NOTES
Chief Complaint   Patient presents with    Routine Prenatal Visit     27 weeks,  2 weeks ago pt went to hospital for SOA, dizziness       HPI: 28 y.o.  at 26w4d     Doing well  Reports good FM  Denies LOF, bleeding or ctx's  She went to ER on with SOA, dizziness on . She was noted to have elevated d-dimer and they were planning CTA but states she had to leave to get her children so she left AMA. She notes SOA on exertion but her hemoglobin was also low at 8.6.   She never started iron due to intolerance to PO iron.  She denies chest pain  Pt of Dr. Eckert      Vitals:    23 1055   BP: 112/70   Weight: 62.1 kg (137 lb)       ROS:  GI:  Negative  : Negative  Pulmonary: Negative     A/P  1. Intrauterine pregnancy at 26w4d   2. Pregnancy Risk:  HIGH RISK    Diagnoses and all orders for this visit:    1. 26 weeks gestation of pregnancy (Primary)  -     POC Urinalysis Dipstick    2. Screening for diabetes mellitus  -     Gestational Screen 1 Hr ([facility])  -     CBC & Differential    3. Request for sterilization    4. Maternal care due to low transverse uterine scar from previous  delivery    5. History of postpartum hemorrhage, currently pregnant    6. Current pregnancy with history of pre-term labor in second trimester    7. History of prior pregnancy with IUGR     8. Nausea and vomiting during pregnancy    9. Iron deficiency anemia during pregnancy        -----------------------  PLAN:   Pt refuses to leave urine sample  Anemia - unable to tolerate PO iron, will plan weekly iron infusions x 3-4.  Discussed pt with Dr. Eckert - will plan to treat anemia for now. If any worsening SOA or chest pain pt to return to ER. Pt is agreeable.  Tdap next visit  F/u 2 weeks with Dr. Tomeka Boss, APRN  2023 11:26 EDT

## 2023-09-28 LAB
DIFFERENTIAL COMMENT: ABNORMAL
ERYTHROCYTE [DISTWIDTH] IN BLOOD BY AUTOMATED COUNT: 16.3 % (ref 12.3–15.4)
GLUCOSE 1H P 50 G GLC PO SERPL-MCNC: 53 MG/DL (ref 65–139)
HCT VFR BLD AUTO: 26.3 % (ref 34–46.6)
HGB BLD-MCNC: 7.9 G/DL (ref 12–15.9)
LYMPHOCYTES # BLD MANUAL: 2.53 10*3/MM3 (ref 0.7–3.1)
LYMPHOCYTES NFR BLD MANUAL: 27.6 % (ref 19.6–45.3)
MCH RBC QN AUTO: 20.9 PG (ref 26.6–33)
MCHC RBC AUTO-ENTMCNC: 30 G/DL (ref 31.5–35.7)
MCV RBC AUTO: 69.6 FL (ref 79–97)
MONOCYTES # BLD MANUAL: 0.28 10*3/MM3 (ref 0.1–0.9)
MONOCYTES NFR BLD MANUAL: 3.1 % (ref 5–12)
NEUTROPHILS # BLD MANUAL: 6.36 10*3/MM3 (ref 1.7–7)
NEUTROPHILS NFR BLD MANUAL: 69.4 % (ref 42.7–76)
NRBC BLD AUTO-RTO: 0.1 /100 WBC (ref 0–0.2)
PLATELET # BLD AUTO: 334 10*3/MM3 (ref 140–450)
PLATELET BLD QL SMEAR: ABNORMAL
RBC # BLD AUTO: 3.78 10*6/MM3 (ref 3.77–5.28)
RBC MORPH BLD: ABNORMAL
WBC # BLD AUTO: 9.17 10*3/MM3 (ref 3.4–10.8)

## 2023-09-29 ENCOUNTER — HOSPITAL ENCOUNTER (OUTPATIENT)
Dept: INFUSION THERAPY | Facility: HOSPITAL | Age: 28
Discharge: HOME | End: 2023-09-29
Payer: COMMERCIAL

## 2023-10-06 ENCOUNTER — HOSPITAL ENCOUNTER (OUTPATIENT)
Dept: INFUSION THERAPY | Facility: HOSPITAL | Age: 28
Discharge: HOME | End: 2023-10-06
Payer: COMMERCIAL

## 2023-10-12 ENCOUNTER — ROUTINE PRENATAL (OUTPATIENT)
Dept: OBSTETRICS AND GYNECOLOGY | Age: 28
End: 2023-10-12
Payer: COMMERCIAL

## 2023-10-12 VITALS — WEIGHT: 140.2 LBS | SYSTOLIC BLOOD PRESSURE: 118 MMHG | BODY MASS INDEX: 24.84 KG/M2 | DIASTOLIC BLOOD PRESSURE: 64 MMHG

## 2023-10-12 DIAGNOSIS — O21.9 NAUSEA AND VOMITING DURING PREGNANCY: ICD-10-CM

## 2023-10-12 DIAGNOSIS — Z3A.28 28 WEEKS GESTATION OF PREGNANCY: Primary | ICD-10-CM

## 2023-10-12 DIAGNOSIS — Z87.59 HISTORY OF PRIOR PREGNANCY WITH IUGR NEWBORN: ICD-10-CM

## 2023-10-12 DIAGNOSIS — O09.299 HISTORY OF POSTPARTUM HEMORRHAGE, CURRENTLY PREGNANT: ICD-10-CM

## 2023-10-12 DIAGNOSIS — O34.211 MATERNAL CARE DUE TO LOW TRANSVERSE UTERINE SCAR FROM PREVIOUS CESAREAN DELIVERY: ICD-10-CM

## 2023-10-12 DIAGNOSIS — R87.612 LGSIL ON PAP SMEAR OF CERVIX: ICD-10-CM

## 2023-10-12 DIAGNOSIS — Z13.89 SCREENING FOR HEMATURIA OR PROTEINURIA: ICD-10-CM

## 2023-10-12 DIAGNOSIS — O99.019 IRON DEFICIENCY ANEMIA DURING PREGNANCY: ICD-10-CM

## 2023-10-12 DIAGNOSIS — O09.212 CURRENT PREGNANCY WITH HISTORY OF PRE-TERM LABOR IN SECOND TRIMESTER: ICD-10-CM

## 2023-10-12 DIAGNOSIS — D50.9 IRON DEFICIENCY ANEMIA DURING PREGNANCY: ICD-10-CM

## 2023-10-12 DIAGNOSIS — Z86.19 H/O SYPHILIS: ICD-10-CM

## 2023-10-12 PROBLEM — Z30.2 REQUEST FOR STERILIZATION: Status: RESOLVED | Noted: 2023-06-08 | Resolved: 2023-10-12

## 2023-10-12 NOTE — PROGRESS NOTES
Chiquita Galvez, a 28 y.o.  at 28w5d, presents for OB follow-up.  She is doing well today.  Denies loss of fluid, vaginal bleeding, and contractions.  Endorses fetal movements.  Very anemic.  Missed first IV iron appt but has next one scheduled tomorrow and plans to attend.  KALA Gastelum is here today.    Objective  BP Readings from Last 3 Encounters:   10/12/23 118/64   23 112/70   23 110/71      Wt Readings from Last 3 Encounters:   10/12/23 63.6 kg (140 lb 3.2 oz)   23 62.1 kg (137 lb)   23 63 kg (139 lb)      Total weight gain this pregnancy: 6.895 kg (15 lb 3.2 oz)    General: Awake, alert, no apparent distress  Respiratory: No increased work of breathing  Abdomen: Fundus soft and nontender  Extremity: Nontender, no edema    A/P  Diagnoses and all orders for this visit:    1. 28 weeks gestation of pregnancy (Primary)  Overview:  -PNL: plan for CBC/HIV/RPR/HepC/GBS at 36wga  -Genetics: cfDNA/carrier/msAFP neg, see cfDNA above, anatomy US normal  -Imm: RI, VI, tdap today, declines flu  -Contraception: Mirena IUD at time of c/s  -Birthplan: RCS, bottle  -Care coordination: accepts blood transfusions, no latex allergy, call schedule reviewed      Orders:  -     POC Urinalysis Dipstick    2. Screening for hematuria or proteinuria  -     POC Urinalysis Dipstick    3. LGSIL on Pap smear of cervix  Overview:  -20: LSIL pap, did not get planned colpo postpartum  -23: NIL      4. Maternal care due to low transverse uterine scar from previous  delivery  Overview:  -prior c/s x 3  -first for NRFS at 34wga, op note reviewed by Dr. Mckeon and noted to be low transverse  -second RCS at term for asymmetric IUGR and fetal decels on Us, also low transverse, op note reviewed  -third RCS at 34wga for NRFS, op note reviewed  -plan for RLTCS with BTL      5. History of postpartum hemorrhage, currently pregnant  Overview:  -preg in , received 4 units PRBC   -Abs neg  -PPH precautions at  delivery      6. Current pregnancy with history of pre-term labor in second trimester  Overview:  -h/o PTL at 34 weeks, delivered via c/s for non-reassuring fetal status (BPP 4/10)  -subsequent pregnancy and 5 prior all delivered at term  -last delivery at 34wga for NRFS incidentally found in triage, no labor sx      7. History of prior pregnancy with IUGR   Overview:  Plan for serial growth Us > 28wga      8. Nausea and vomiting during pregnancy  Overview:  -Rx zofran sent      9. Iron deficiency anemia during pregnancy  Overview:  -H/o severe anemia requiring blood transfusions in prior preg  -Hb electro normal  -Hb 7.9  -She has been scheduled for IV iron but missed first dose.  Discussed risks of such sever anemia in preg and importance of completing.  Next dose scheduled 10/13.  Map and directions provided.      10. H/O syphilis  Overview:  -H/o Syphilis many yrs ago, s/p tx  -Ab titer stable at 1:1      Other orders  -     Tdap Vaccine Greater Than or Equal To 8yo IM        Labor precautions reviewed  Return in about 2 weeks (around 10/26/2023) for Next f/u with growth Us in 2 wks then appt q2 wks x3 (4 total visits).    Heather Eckert MD

## 2023-10-13 ENCOUNTER — HOSPITAL ENCOUNTER (OUTPATIENT)
Dept: INFUSION THERAPY | Facility: HOSPITAL | Age: 28
Discharge: HOME | End: 2023-10-13
Payer: COMMERCIAL

## 2023-10-13 VITALS
SYSTOLIC BLOOD PRESSURE: 104 MMHG | DIASTOLIC BLOOD PRESSURE: 68 MMHG | RESPIRATION RATE: 16 BRPM | TEMPERATURE: 98.2 F | HEART RATE: 90 BPM | OXYGEN SATURATION: 100 %

## 2023-10-13 DIAGNOSIS — D50.9 IRON DEFICIENCY ANEMIA DURING PREGNANCY: Primary | ICD-10-CM

## 2023-10-13 DIAGNOSIS — O99.019 IRON DEFICIENCY ANEMIA DURING PREGNANCY: Primary | ICD-10-CM

## 2023-10-13 PROCEDURE — 96366 THER/PROPH/DIAG IV INF ADDON: CPT

## 2023-10-13 PROCEDURE — 96365 THER/PROPH/DIAG IV INF INIT: CPT

## 2023-10-13 PROCEDURE — 63710000001 DIPHENHYDRAMINE PER 50 MG: Performed by: NURSE PRACTITIONER

## 2023-10-13 PROCEDURE — 25010000002 IRON SUCROSE PER 1 MG: Performed by: NURSE PRACTITIONER

## 2023-10-13 PROCEDURE — 25810000003 SODIUM CHLORIDE 0.9 % SOLUTION 250 ML FLEX CONT: Performed by: NURSE PRACTITIONER

## 2023-10-13 RX ORDER — FAMOTIDINE 10 MG/ML
20 INJECTION, SOLUTION INTRAVENOUS AS NEEDED
Status: CANCELLED | OUTPATIENT
Start: 2023-10-20

## 2023-10-13 RX ORDER — FAMOTIDINE 10 MG/ML
20 INJECTION, SOLUTION INTRAVENOUS AS NEEDED
Status: DISCONTINUED | OUTPATIENT
Start: 2023-10-13 | End: 2023-10-15 | Stop reason: HOSPADM

## 2023-10-13 RX ORDER — FAMOTIDINE 20 MG/1
20 TABLET, FILM COATED ORAL ONCE
Status: CANCELLED | OUTPATIENT
Start: 2023-10-20

## 2023-10-13 RX ORDER — DIPHENHYDRAMINE HCL 25 MG
25 CAPSULE ORAL ONCE
Status: CANCELLED | OUTPATIENT
Start: 2023-10-20

## 2023-10-13 RX ORDER — SODIUM CHLORIDE 9 MG/ML
250 INJECTION, SOLUTION INTRAVENOUS ONCE
Status: CANCELLED | OUTPATIENT
Start: 2023-10-20

## 2023-10-13 RX ORDER — ACETAMINOPHEN 325 MG/1
650 TABLET ORAL ONCE
Status: COMPLETED | OUTPATIENT
Start: 2023-10-13 | End: 2023-10-13

## 2023-10-13 RX ORDER — DIPHENHYDRAMINE HYDROCHLORIDE 50 MG/ML
50 INJECTION INTRAMUSCULAR; INTRAVENOUS AS NEEDED
Status: CANCELLED | OUTPATIENT
Start: 2023-10-20

## 2023-10-13 RX ORDER — SODIUM CHLORIDE 9 MG/ML
250 INJECTION, SOLUTION INTRAVENOUS ONCE
Status: DISCONTINUED | OUTPATIENT
Start: 2023-10-13 | End: 2023-10-15 | Stop reason: HOSPADM

## 2023-10-13 RX ORDER — DIPHENHYDRAMINE HCL 25 MG
25 CAPSULE ORAL ONCE
Status: COMPLETED | OUTPATIENT
Start: 2023-10-13 | End: 2023-10-13

## 2023-10-13 RX ORDER — FAMOTIDINE 10 MG/ML
20 INJECTION, SOLUTION INTRAVENOUS ONCE
OUTPATIENT
Start: 2023-10-20

## 2023-10-13 RX ORDER — FAMOTIDINE 20 MG/1
20 TABLET, FILM COATED ORAL ONCE
Status: COMPLETED | OUTPATIENT
Start: 2023-10-13 | End: 2023-10-13

## 2023-10-13 RX ORDER — DIPHENHYDRAMINE HYDROCHLORIDE 50 MG/ML
25 INJECTION INTRAMUSCULAR; INTRAVENOUS ONCE
OUTPATIENT
Start: 2023-10-20

## 2023-10-13 RX ORDER — ACETAMINOPHEN 325 MG/1
650 TABLET ORAL ONCE
Status: CANCELLED | OUTPATIENT
Start: 2023-10-20

## 2023-10-13 RX ORDER — DIPHENHYDRAMINE HYDROCHLORIDE 50 MG/ML
50 INJECTION INTRAMUSCULAR; INTRAVENOUS AS NEEDED
Status: DISCONTINUED | OUTPATIENT
Start: 2023-10-13 | End: 2023-10-15 | Stop reason: HOSPADM

## 2023-10-13 RX ADMIN — DIPHENHYDRAMINE HYDROCHLORIDE 25 MG: 25 CAPSULE ORAL at 12:25

## 2023-10-13 RX ADMIN — ACETAMINOPHEN 650 MG: 325 TABLET, FILM COATED ORAL at 12:25

## 2023-10-13 RX ADMIN — FAMOTIDINE 20 MG: 20 TABLET ORAL at 12:26

## 2023-10-13 RX ADMIN — IRON SUCROSE 300 MG: 20 INJECTION, SOLUTION INTRAVENOUS at 12:42

## 2023-10-13 NOTE — PROGRESS NOTES
"Patient reports on arrival she was told by Dr. Eckert that she would receive a total of five Venofer Infusions. Therapy plan is currently for three doses.  RN spoke with PADILLA Hudson at 1403 who is to leave a message for Dr. Eckert regarding number of infusions patient will need and will notify [facility] if we need additional doses.    Patient tolerated infusion without complaints. No S/S reaction noted. Patient stated she \"cannot stay 30 minute recovery/observation period because she has to get her kids off the bus.\" Patient D/C'd ambulatory with spouse.  "

## 2023-10-20 ENCOUNTER — HOSPITAL ENCOUNTER (OUTPATIENT)
Dept: INFUSION THERAPY | Facility: HOSPITAL | Age: 28
Discharge: HOME | End: 2023-10-20
Payer: COMMERCIAL

## 2023-10-20 ENCOUNTER — PATIENT OUTREACH (OUTPATIENT)
Dept: LABOR AND DELIVERY | Facility: HOSPITAL | Age: 28
End: 2023-10-20
Payer: COMMERCIAL

## 2023-10-20 VITALS
RESPIRATION RATE: 16 BRPM | SYSTOLIC BLOOD PRESSURE: 105 MMHG | OXYGEN SATURATION: 100 % | HEART RATE: 91 BPM | TEMPERATURE: 96.9 F | DIASTOLIC BLOOD PRESSURE: 64 MMHG

## 2023-10-20 DIAGNOSIS — D50.9 IRON DEFICIENCY ANEMIA DURING PREGNANCY: Primary | ICD-10-CM

## 2023-10-20 DIAGNOSIS — O99.019 IRON DEFICIENCY ANEMIA DURING PREGNANCY: Primary | ICD-10-CM

## 2023-10-20 PROCEDURE — 25810000003 SODIUM CHLORIDE 0.9 % SOLUTION 250 ML FLEX CONT: Performed by: NURSE PRACTITIONER

## 2023-10-20 PROCEDURE — 25010000002 IRON SUCROSE PER 1 MG: Performed by: NURSE PRACTITIONER

## 2023-10-20 PROCEDURE — 96365 THER/PROPH/DIAG IV INF INIT: CPT

## 2023-10-20 PROCEDURE — 63710000001 DIPHENHYDRAMINE PER 50 MG: Performed by: NURSE PRACTITIONER

## 2023-10-20 PROCEDURE — 96366 THER/PROPH/DIAG IV INF ADDON: CPT

## 2023-10-20 RX ORDER — ACETAMINOPHEN 325 MG/1
650 TABLET ORAL ONCE
Status: COMPLETED | OUTPATIENT
Start: 2023-10-20 | End: 2023-10-20

## 2023-10-20 RX ORDER — SODIUM CHLORIDE 9 MG/ML
250 INJECTION, SOLUTION INTRAVENOUS ONCE
Status: DISCONTINUED | OUTPATIENT
Start: 2023-10-20 | End: 2023-10-22 | Stop reason: HOSPADM

## 2023-10-20 RX ORDER — DIPHENHYDRAMINE HYDROCHLORIDE 50 MG/ML
50 INJECTION INTRAMUSCULAR; INTRAVENOUS AS NEEDED
OUTPATIENT
Start: 2023-10-27

## 2023-10-20 RX ORDER — FAMOTIDINE 10 MG/ML
20 INJECTION, SOLUTION INTRAVENOUS AS NEEDED
OUTPATIENT
Start: 2023-10-27

## 2023-10-20 RX ORDER — FAMOTIDINE 20 MG/1
20 TABLET, FILM COATED ORAL ONCE
Status: COMPLETED | OUTPATIENT
Start: 2023-10-20 | End: 2023-10-20

## 2023-10-20 RX ORDER — DIPHENHYDRAMINE HYDROCHLORIDE 50 MG/ML
25 INJECTION INTRAMUSCULAR; INTRAVENOUS ONCE
OUTPATIENT
Start: 2023-10-27

## 2023-10-20 RX ORDER — DIPHENHYDRAMINE HCL 25 MG
25 CAPSULE ORAL ONCE
Status: CANCELLED | OUTPATIENT
Start: 2023-10-27

## 2023-10-20 RX ORDER — FAMOTIDINE 20 MG/1
20 TABLET, FILM COATED ORAL ONCE
Status: CANCELLED | OUTPATIENT
Start: 2023-10-27

## 2023-10-20 RX ORDER — FAMOTIDINE 10 MG/ML
20 INJECTION, SOLUTION INTRAVENOUS AS NEEDED
Status: DISCONTINUED | OUTPATIENT
Start: 2023-10-20 | End: 2023-10-22 | Stop reason: HOSPADM

## 2023-10-20 RX ORDER — ACETAMINOPHEN 325 MG/1
650 TABLET ORAL ONCE
Status: CANCELLED | OUTPATIENT
Start: 2023-10-27

## 2023-10-20 RX ORDER — SODIUM CHLORIDE 9 MG/ML
250 INJECTION, SOLUTION INTRAVENOUS ONCE
OUTPATIENT
Start: 2023-10-27

## 2023-10-20 RX ORDER — DIPHENHYDRAMINE HYDROCHLORIDE 50 MG/ML
50 INJECTION INTRAMUSCULAR; INTRAVENOUS AS NEEDED
Status: DISCONTINUED | OUTPATIENT
Start: 2023-10-20 | End: 2023-10-22 | Stop reason: HOSPADM

## 2023-10-20 RX ORDER — FAMOTIDINE 10 MG/ML
20 INJECTION, SOLUTION INTRAVENOUS ONCE
OUTPATIENT
Start: 2023-10-27

## 2023-10-20 RX ORDER — DIPHENHYDRAMINE HCL 25 MG
25 CAPSULE ORAL ONCE
Status: COMPLETED | OUTPATIENT
Start: 2023-10-20 | End: 2023-10-20

## 2023-10-20 RX ADMIN — DIPHENHYDRAMINE HYDROCHLORIDE 25 MG: 25 CAPSULE ORAL at 10:58

## 2023-10-20 RX ADMIN — IRON SUCROSE 300 MG: 20 INJECTION, SOLUTION INTRAVENOUS at 11:20

## 2023-10-20 RX ADMIN — ACETAMINOPHEN 650 MG: 325 TABLET, FILM COATED ORAL at 10:58

## 2023-10-20 RX ADMIN — FAMOTIDINE 20 MG: 20 TABLET, FILM COATED ORAL at 10:58

## 2023-10-20 NOTE — OUTREACH NOTE
[facility]  Check-In    Current Estimated Gestational Age: 29w6d      Questions/Answers      Flowsheet Row Responses   Best Method for Contacting Cell   Demographics Reviewed Yes   Currently Employed Yes   Able to keep appointments as scheduled Yes   Gender(s) and Name(s) girl   Baby Active/Feeling Fetal Movemen Yes   How are you presently feeling? good, needing some transportation assistance today   Resource/Environmental Concerns Reliable Transportation, Financial   Do you have any questions related to your care experience, your pregnancy, plans for delivery, any concerns, etc? No   Other Education [facility] Benefits, Insurance benefits/Incentives, Transportation Assistance          Pt called for transportation assistance. Sal ride schedule for infusion and appt next week and discussed Linette Dash for future assistance. She reports active fetal movement. She is working in infant supplies and will plan to visit the maternity pantry soon. She cannot get through to [facility] on the number she has. Sent alternative number to try. F/u in two weeks.     Sofy Lomeli RN  Maternity Nurse Navigator    10/20/2023, 09:51 EDT

## 2023-10-25 ENCOUNTER — ROUTINE PRENATAL (OUTPATIENT)
Dept: OBSTETRICS AND GYNECOLOGY | Age: 28
End: 2023-10-25
Payer: COMMERCIAL

## 2023-10-25 ENCOUNTER — PATIENT OUTREACH (OUTPATIENT)
Dept: LABOR AND DELIVERY | Facility: HOSPITAL | Age: 28
End: 2023-10-25
Payer: COMMERCIAL

## 2023-10-25 VITALS — BODY MASS INDEX: 25.08 KG/M2 | WEIGHT: 141.6 LBS | DIASTOLIC BLOOD PRESSURE: 58 MMHG | SYSTOLIC BLOOD PRESSURE: 114 MMHG

## 2023-10-25 DIAGNOSIS — O09.299 HISTORY OF POSTPARTUM HEMORRHAGE, CURRENTLY PREGNANT: ICD-10-CM

## 2023-10-25 DIAGNOSIS — Z3A.30 30 WEEKS GESTATION OF PREGNANCY: Primary | ICD-10-CM

## 2023-10-25 DIAGNOSIS — O99.019 IRON DEFICIENCY ANEMIA DURING PREGNANCY: ICD-10-CM

## 2023-10-25 DIAGNOSIS — R87.612 LGSIL ON PAP SMEAR OF CERVIX: ICD-10-CM

## 2023-10-25 DIAGNOSIS — O34.211 MATERNAL CARE DUE TO LOW TRANSVERSE UTERINE SCAR FROM PREVIOUS CESAREAN DELIVERY: ICD-10-CM

## 2023-10-25 DIAGNOSIS — Z87.59 HISTORY OF PRIOR PREGNANCY WITH IUGR NEWBORN: ICD-10-CM

## 2023-10-25 DIAGNOSIS — Z86.19 H/O SYPHILIS: ICD-10-CM

## 2023-10-25 DIAGNOSIS — R30.0 DYSURIA: ICD-10-CM

## 2023-10-25 DIAGNOSIS — O09.212 CURRENT PREGNANCY WITH HISTORY OF PRE-TERM LABOR IN SECOND TRIMESTER: ICD-10-CM

## 2023-10-25 DIAGNOSIS — D50.9 IRON DEFICIENCY ANEMIA DURING PREGNANCY: ICD-10-CM

## 2023-10-25 DIAGNOSIS — Z13.89 SCREENING FOR HEMATURIA OR PROTEINURIA: ICD-10-CM

## 2023-10-25 DIAGNOSIS — O21.9 NAUSEA AND VOMITING DURING PREGNANCY: ICD-10-CM

## 2023-10-25 LAB
BILIRUB BLD-MCNC: ABNORMAL MG/DL
CLARITY, POC: ABNORMAL
COLOR UR: YELLOW
GLUCOSE UR STRIP-MCNC: NEGATIVE MG/DL
KETONES UR QL: NEGATIVE
LEUKOCYTE EST, POC: ABNORMAL
NITRITE UR-MCNC: NEGATIVE MG/ML
PH UR: 7 [PH] (ref 5–8)
PROT UR STRIP-MCNC: ABNORMAL MG/DL
RBC # UR STRIP: ABNORMAL /UL
SP GR UR: 1.02 (ref 1–1.03)
UROBILINOGEN UR QL: NORMAL

## 2023-10-25 RX ORDER — FAMOTIDINE 20 MG/1
20 TABLET, FILM COATED ORAL WEEKLY
Status: CANCELLED | OUTPATIENT
Start: 2023-11-03

## 2023-10-25 RX ORDER — KETOROLAC TROMETHAMINE 15 MG/ML
30 INJECTION, SOLUTION INTRAMUSCULAR; INTRAVENOUS ONCE
OUTPATIENT
Start: 2023-10-25 | End: 2023-10-25

## 2023-10-25 RX ORDER — ACETAMINOPHEN 500 MG
1000 TABLET ORAL ONCE
OUTPATIENT
Start: 2023-10-25 | End: 2023-10-25

## 2023-10-25 RX ORDER — OXYTOCIN/0.9 % SODIUM CHLORIDE 30/500 ML
999 PLASTIC BAG, INJECTION (ML) INTRAVENOUS ONCE
OUTPATIENT
Start: 2023-10-25

## 2023-10-25 RX ORDER — SODIUM CHLORIDE 0.9 % (FLUSH) 0.9 %
10 SYRINGE (ML) INJECTION AS NEEDED
OUTPATIENT
Start: 2023-10-25

## 2023-10-25 RX ORDER — FAMOTIDINE 10 MG/ML
20 INJECTION, SOLUTION INTRAVENOUS WEEKLY
Status: CANCELLED | OUTPATIENT
Start: 2023-11-03

## 2023-10-25 RX ORDER — SODIUM CHLORIDE 9 MG/ML
250 INJECTION, SOLUTION INTRAVENOUS ONCE
Status: CANCELLED | OUTPATIENT
Start: 2023-11-03

## 2023-10-25 RX ORDER — FAMOTIDINE 10 MG/ML
20 INJECTION, SOLUTION INTRAVENOUS ONCE
Status: CANCELLED | OUTPATIENT
Start: 2023-11-03

## 2023-10-25 RX ORDER — FAMOTIDINE 20 MG/1
20 TABLET, FILM COATED ORAL ONCE
Status: CANCELLED | OUTPATIENT
Start: 2023-11-03

## 2023-10-25 RX ORDER — DIPHENHYDRAMINE HCL 25 MG
25 CAPSULE ORAL ONCE
Status: CANCELLED | OUTPATIENT
Start: 2023-11-03

## 2023-10-25 RX ORDER — SODIUM CHLORIDE 0.9 % (FLUSH) 0.9 %
10 SYRINGE (ML) INJECTION EVERY 12 HOURS SCHEDULED
OUTPATIENT
Start: 2023-10-25

## 2023-10-25 RX ORDER — ACETAMINOPHEN 325 MG/1
650 TABLET ORAL WEEKLY
Status: CANCELLED | OUTPATIENT
Start: 2023-11-03

## 2023-10-25 RX ORDER — NITROFURANTOIN 25; 75 MG/1; MG/1
100 CAPSULE ORAL 2 TIMES DAILY
Qty: 14 CAPSULE | Refills: 0 | Status: SHIPPED | OUTPATIENT
Start: 2023-10-25 | End: 2023-11-01

## 2023-10-25 RX ORDER — LIDOCAINE HYDROCHLORIDE 10 MG/ML
0.5 INJECTION, SOLUTION INFILTRATION; PERINEURAL ONCE AS NEEDED
OUTPATIENT
Start: 2023-10-25

## 2023-10-25 RX ORDER — CARBOPROST TROMETHAMINE 250 UG/ML
250 INJECTION, SOLUTION INTRAMUSCULAR AS NEEDED
OUTPATIENT
Start: 2023-10-25

## 2023-10-25 RX ORDER — SODIUM CHLORIDE 9 MG/ML
40 INJECTION, SOLUTION INTRAVENOUS AS NEEDED
OUTPATIENT
Start: 2023-10-25

## 2023-10-25 RX ORDER — DIPHENHYDRAMINE HYDROCHLORIDE 50 MG/ML
50 INJECTION INTRAMUSCULAR; INTRAVENOUS AS NEEDED
Status: CANCELLED | OUTPATIENT
Start: 2023-11-03

## 2023-10-25 RX ORDER — SODIUM CHLORIDE, SODIUM LACTATE, POTASSIUM CHLORIDE, CALCIUM CHLORIDE 600; 310; 30; 20 MG/100ML; MG/100ML; MG/100ML; MG/100ML
125 INJECTION, SOLUTION INTRAVENOUS CONTINUOUS
OUTPATIENT
Start: 2023-10-25

## 2023-10-25 RX ORDER — MISOPROSTOL 100 UG/1
800 TABLET ORAL AS NEEDED
OUTPATIENT
Start: 2023-10-25

## 2023-10-25 RX ORDER — DIPHENHYDRAMINE HYDROCHLORIDE 50 MG/ML
25 INJECTION INTRAMUSCULAR; INTRAVENOUS WEEKLY
Status: CANCELLED | OUTPATIENT
Start: 2023-11-03

## 2023-10-25 RX ORDER — ONDANSETRON 2 MG/ML
4 INJECTION INTRAMUSCULAR; INTRAVENOUS EVERY 6 HOURS PRN
OUTPATIENT
Start: 2023-10-25

## 2023-10-25 RX ORDER — ACETAMINOPHEN 325 MG/1
650 TABLET ORAL ONCE
Status: CANCELLED | OUTPATIENT
Start: 2023-11-03

## 2023-10-25 RX ORDER — ONDANSETRON 4 MG/1
4 TABLET, FILM COATED ORAL EVERY 6 HOURS PRN
OUTPATIENT
Start: 2023-10-25

## 2023-10-25 RX ORDER — METHYLERGONOVINE MALEATE 0.2 MG/ML
200 INJECTION INTRAVENOUS AS NEEDED
OUTPATIENT
Start: 2023-10-25

## 2023-10-25 RX ORDER — DIPHENHYDRAMINE HCL 25 MG
25 CAPSULE ORAL WEEKLY
Status: CANCELLED | OUTPATIENT
Start: 2023-11-03

## 2023-10-25 RX ORDER — DIPHENHYDRAMINE HYDROCHLORIDE 50 MG/ML
25 INJECTION INTRAMUSCULAR; INTRAVENOUS ONCE
Status: CANCELLED | OUTPATIENT
Start: 2023-11-03

## 2023-10-25 RX ORDER — FAMOTIDINE 10 MG/ML
20 INJECTION, SOLUTION INTRAVENOUS AS NEEDED
Status: CANCELLED | OUTPATIENT
Start: 2023-11-03

## 2023-10-25 RX ORDER — OXYTOCIN/0.9 % SODIUM CHLORIDE 30/500 ML
250 PLASTIC BAG, INJECTION (ML) INTRAVENOUS CONTINUOUS
OUTPATIENT
Start: 2023-10-25 | End: 2023-10-25

## 2023-10-25 NOTE — OUTREACH NOTE
[facility]  Check-In    Current Estimated Gestational Age: 30w4d      Questions/Answers      Flowsheet Row Responses   Best Method for Contacting Cell   Demographics Reviewed Yes   Other Education Transportation Assistance, WIC Benefits          Pt called today because she could not figure out how to order her lyft ride home but then she figured it out. Reviewed new WIC number and she will call for appt. She is planning to use code for  Dash on Friday to infusion so that she can also visit maternity pantry. F/u next week.     Sofy Lomeli RN  Maternity Nurse Navigator    10/25/2023, 13:05 EDT

## 2023-10-25 NOTE — PROGRESS NOTES
Chiquita Galvez, a 28 y.o.  at 30w4d, presents for OB follow-up.  She is doing well today.  Denies loss of fluid, vaginal bleeding, and contractions.  Endorses fetal movements.    Objective  BP Readings from Last 3 Encounters:   10/25/23 114/58   10/20/23 105/64   10/13/23 104/68      Wt Readings from Last 3 Encounters:   10/25/23 64.2 kg (141 lb 9.6 oz)   10/12/23 63.6 kg (140 lb 3.2 oz)   23 62.1 kg (137 lb)      Total weight gain this pregnancy: 7.53 kg (16 lb 9.6 oz)    General: Awake, alert, no apparent distress  Respiratory: No increased work of breathing  Abdomen: Fundus soft and nontender  Extremity: Nontender, no edema    A/P  Diagnoses and all orders for this visit:    1. 30 weeks gestation of pregnancy (Primary)  Overview:  -PNL: plan for CBC/HIV/RPR/HepC/GBS at 36wga  -Genetics: cfDNA/carrier/msAFP neg, see cfDNA above, anatomy US normal  -Imm: RI, VI, s/p tdap, flu shot at next visit  -Contraception: Mirena IUD at time of c/s  -Birthplan: RCS, bottle  -Care coordination: accepts blood transfusions, no latex allergy, call schedule reviewed      Orders:  -     POC Urinalysis Dipstick  -     Urine Culture - Urine, Urine, Clean Catch    2. Screening for hematuria or proteinuria  -     POC Urinalysis Dipstick    3. LGSIL on Pap smear of cervix  Overview:  -20: LSIL pap, did not get planned colpo postpartum  -23: NIL      4. Maternal care due to low transverse uterine scar from previous  delivery  Overview:  -prior c/s x 3  -first for NRFS at 34wga, op note reviewed by Dr. Mckeon and noted to be low transverse  -second RCS at term for asymmetric IUGR and fetal decels on Us, also low transverse, op note reviewed  -third RCS at 34wga for NRFS, op note reviewed  -plan for RLTCS with Mirena IUD insertion, scheduled 23      5. History of postpartum hemorrhage, currently pregnant  Overview:  -preg in , received 4 units PRBC   -Abs neg  -PPH precautions at delivery      6.  Current pregnancy with history of pre-term labor in second trimester  Overview:  -h/o PTL at 34 weeks, delivered via c/s for non-reassuring fetal status (BPP 4/10)  -subsequent pregnancy and 5 prior all delivered at term  -last delivery at 34wga for NRFS incidentally found in triage, no labor sx      7. History of prior pregnancy with IUGR   Overview:  Plan for serial growth Us > 28wga, normal at 30wga, repeat at 34wga      8. Nausea and vomiting during pregnancy  Overview:  -Rx zofran sent      9. Iron deficiency anemia during pregnancy  Overview:  -H/o severe anemia requiring blood transfusions in prior preg  -Hb electro normal  -Hb 7.9, iron deficit 1200mg  -S/p IV iron x 2, next scheduled 10/27, plan for 4-5 total doses      10. H/O syphilis  Overview:  -H/o Syphilis many yrs ago, s/p tx  -Ab titer stable at 1:1      11. Dysuria  Overview:  -UA and culture today  -Will empirically tx with macrobid, rx sent    Orders:  -     Urine Culture - Urine, Urine, Clean Catch    Other orders  -     nitrofurantoin, macrocrystal-monohydrate, (Macrobid) 100 MG capsule; Take 1 capsule by mouth 2 (Two) Times a Day for 7 days.  Dispense: 14 capsule; Refill: 0        Labor precautions reviewed  Return in about 2 weeks (around 2023) for Next f/u.    Heather Eckert MD

## 2023-10-26 RX ORDER — DIPHENHYDRAMINE HYDROCHLORIDE 50 MG/ML
50 INJECTION INTRAMUSCULAR; INTRAVENOUS AS NEEDED
OUTPATIENT
Start: 2023-11-03

## 2023-10-26 RX ORDER — FAMOTIDINE 20 MG/1
20 TABLET, FILM COATED ORAL WEEKLY
OUTPATIENT
Start: 2023-11-03

## 2023-10-26 RX ORDER — ACETAMINOPHEN 325 MG/1
650 TABLET ORAL WEEKLY
OUTPATIENT
Start: 2023-11-03

## 2023-10-26 RX ORDER — FAMOTIDINE 10 MG/ML
20 INJECTION, SOLUTION INTRAVENOUS WEEKLY
OUTPATIENT
Start: 2023-11-03

## 2023-10-26 RX ORDER — DIPHENHYDRAMINE HYDROCHLORIDE 50 MG/ML
25 INJECTION INTRAMUSCULAR; INTRAVENOUS WEEKLY
OUTPATIENT
Start: 2023-11-03

## 2023-10-26 RX ORDER — SODIUM CHLORIDE 9 MG/ML
250 INJECTION, SOLUTION INTRAVENOUS ONCE
OUTPATIENT
Start: 2023-11-03

## 2023-10-26 RX ORDER — DIPHENHYDRAMINE HCL 25 MG
25 CAPSULE ORAL WEEKLY
OUTPATIENT
Start: 2023-11-03

## 2023-10-26 RX ORDER — FAMOTIDINE 10 MG/ML
20 INJECTION, SOLUTION INTRAVENOUS AS NEEDED
OUTPATIENT
Start: 2023-11-03

## 2023-10-27 ENCOUNTER — TELEPHONE (OUTPATIENT)
Dept: OBSTETRICS AND GYNECOLOGY | Age: 28
End: 2023-10-27
Payer: COMMERCIAL

## 2023-10-27 ENCOUNTER — HOSPITAL ENCOUNTER (OUTPATIENT)
Dept: INFUSION THERAPY | Facility: HOSPITAL | Age: 28
Discharge: HOME | End: 2023-10-27
Payer: COMMERCIAL

## 2023-10-27 VITALS
RESPIRATION RATE: 20 BRPM | DIASTOLIC BLOOD PRESSURE: 54 MMHG | HEART RATE: 87 BPM | SYSTOLIC BLOOD PRESSURE: 105 MMHG | OXYGEN SATURATION: 100 % | TEMPERATURE: 97.5 F

## 2023-10-27 DIAGNOSIS — O99.019 IRON DEFICIENCY ANEMIA DURING PREGNANCY: Primary | ICD-10-CM

## 2023-10-27 DIAGNOSIS — D50.9 IRON DEFICIENCY ANEMIA DURING PREGNANCY: Primary | ICD-10-CM

## 2023-10-27 LAB
BACTERIA UR CULT: ABNORMAL

## 2023-10-27 PROCEDURE — 63710000001 DIPHENHYDRAMINE PER 50 MG: Performed by: NURSE PRACTITIONER

## 2023-10-27 PROCEDURE — 25810000003 SODIUM CHLORIDE 0.9 % SOLUTION 250 ML FLEX CONT: Performed by: NURSE PRACTITIONER

## 2023-10-27 PROCEDURE — 25010000002 IRON SUCROSE PER 1 MG: Performed by: NURSE PRACTITIONER

## 2023-10-27 PROCEDURE — 96365 THER/PROPH/DIAG IV INF INIT: CPT

## 2023-10-27 PROCEDURE — 96366 THER/PROPH/DIAG IV INF ADDON: CPT

## 2023-10-27 RX ORDER — DIPHENHYDRAMINE HYDROCHLORIDE 50 MG/ML
25 INJECTION INTRAMUSCULAR; INTRAVENOUS ONCE
OUTPATIENT
Start: 2023-11-03

## 2023-10-27 RX ORDER — ACETAMINOPHEN 325 MG/1
650 TABLET ORAL ONCE
Status: CANCELLED | OUTPATIENT
Start: 2023-11-03

## 2023-10-27 RX ORDER — FAMOTIDINE 10 MG/ML
20 INJECTION, SOLUTION INTRAVENOUS AS NEEDED
OUTPATIENT
Start: 2023-11-03

## 2023-10-27 RX ORDER — SODIUM CHLORIDE 9 MG/ML
250 INJECTION, SOLUTION INTRAVENOUS ONCE
OUTPATIENT
Start: 2023-11-03

## 2023-10-27 RX ORDER — DIPHENHYDRAMINE HYDROCHLORIDE 50 MG/ML
50 INJECTION INTRAMUSCULAR; INTRAVENOUS AS NEEDED
OUTPATIENT
Start: 2023-11-03

## 2023-10-27 RX ORDER — FAMOTIDINE 20 MG/1
20 TABLET, FILM COATED ORAL ONCE
Status: COMPLETED | OUTPATIENT
Start: 2023-10-27 | End: 2023-10-27

## 2023-10-27 RX ORDER — FAMOTIDINE 10 MG/ML
20 INJECTION, SOLUTION INTRAVENOUS ONCE
OUTPATIENT
Start: 2023-11-03

## 2023-10-27 RX ORDER — ACETAMINOPHEN 325 MG/1
650 TABLET ORAL ONCE
Status: COMPLETED | OUTPATIENT
Start: 2023-10-27 | End: 2023-10-27

## 2023-10-27 RX ORDER — FAMOTIDINE 20 MG/1
20 TABLET, FILM COATED ORAL ONCE
Status: CANCELLED | OUTPATIENT
Start: 2023-11-03

## 2023-10-27 RX ORDER — DIPHENHYDRAMINE HCL 25 MG
25 CAPSULE ORAL ONCE
Status: CANCELLED | OUTPATIENT
Start: 2023-11-03

## 2023-10-27 RX ORDER — DIPHENHYDRAMINE HCL 25 MG
25 CAPSULE ORAL ONCE
Status: COMPLETED | OUTPATIENT
Start: 2023-10-27 | End: 2023-10-27

## 2023-10-27 RX ADMIN — IRON SUCROSE 300 MG: 20 INJECTION, SOLUTION INTRAVENOUS at 12:10

## 2023-10-27 RX ADMIN — FAMOTIDINE 20 MG: 20 TABLET, FILM COATED ORAL at 11:40

## 2023-10-27 RX ADMIN — ACETAMINOPHEN 650 MG: 325 TABLET ORAL at 11:40

## 2023-10-27 RX ADMIN — DIPHENHYDRAMINE HYDROCHLORIDE 25 MG: 25 CAPSULE ORAL at 11:40

## 2023-10-27 NOTE — TELEPHONE ENCOUNTER
[facility] calling regarding Pt. She is there for iron infusion, this is the last one that they have an order for. Pt states that she is supposed to have 2 more infusions. The [facility] is requesting orders for 2 more iron infusions.

## 2023-10-27 NOTE — PROGRESS NOTES
"Patient tolerated infusion without complaints. No S/S reaction noted. Patient stated she \"cannot stay 30 minute recovery/observation period because she has to get her kids off the bus.\" Patient D/C'd ambulatory with spouse.  "

## 2023-10-29 PROBLEM — R82.71 GBS BACTERIURIA: Status: ACTIVE | Noted: 2023-10-29

## 2023-11-02 ENCOUNTER — PATIENT OUTREACH (OUTPATIENT)
Dept: LABOR AND DELIVERY | Facility: HOSPITAL | Age: 28
End: 2023-11-02
Payer: COMMERCIAL

## 2023-11-02 NOTE — OUTREACH NOTE
[facility]  Unable to Reach       Questions/Answers      Flowsheet Row Responses   Pending Outreach Prenatal Check-in   Call Attempt First   Outcome Not available   Next Call Attempt Date 11/03/23          Male answered her phone but stated that she is at work today. He says she is off tomorrow so I will try again then.     Sofy Lomeli RN  Maternity Nurse Navigator    11/2/2023, 13:30 EDT

## 2023-11-03 ENCOUNTER — HOSPITAL ENCOUNTER (OUTPATIENT)
Dept: INFUSION THERAPY | Facility: HOSPITAL | Age: 28
Discharge: HOME | End: 2023-11-03
Payer: COMMERCIAL

## 2023-11-03 ENCOUNTER — PATIENT OUTREACH (OUTPATIENT)
Dept: LABOR AND DELIVERY | Facility: HOSPITAL | Age: 28
End: 2023-11-03
Payer: COMMERCIAL

## 2023-11-03 NOTE — OUTREACH NOTE
[facility]  Check-In    Current Estimated Gestational Age: 31w6d      Questions/Answers      Flowsheet Row Responses   Best Method for Contacting Cell   Demographics Reviewed Yes   Currently Employed Yes   Able to keep appointments as scheduled No   Gender(s) and Name(s) girl   Baby Active/Feeling Fetal Movemen Yes   How are you presently feeling? ok, working on infant supplies today   Resource/Environmental Concerns Financial, Reliable Transportation   Do you have any questions related to your care experience, your pregnancy, plans for delivery, any concerns, etc? No   Other Education Transportation Assistance, [facility] Benefits, Insurance benefits/Incentives        Pt called because she missed infusion today, needs help next week. Sal scheduled to infusion next Friday. She called [employer] about car seat benefit today and it should ship soon. Requested other resources for infant supplies and this was sent. Also sent number for [facility] so that she can call for appt. F/u next week to check on resources.     Sofy Lomeli RN  Maternity Nurse Navigator    11/3/2023, 12:54 EDT

## 2023-11-10 ENCOUNTER — HOSPITAL ENCOUNTER (OUTPATIENT)
Dept: INFUSION THERAPY | Facility: HOSPITAL | Age: 28
Discharge: HOME | End: 2023-11-10
Payer: COMMERCIAL

## 2023-11-10 VITALS
HEART RATE: 96 BPM | RESPIRATION RATE: 20 BRPM | DIASTOLIC BLOOD PRESSURE: 63 MMHG | OXYGEN SATURATION: 97 % | SYSTOLIC BLOOD PRESSURE: 102 MMHG | TEMPERATURE: 96.6 F

## 2023-11-10 DIAGNOSIS — O99.019 IRON DEFICIENCY ANEMIA DURING PREGNANCY: ICD-10-CM

## 2023-11-10 DIAGNOSIS — D50.9 IRON DEFICIENCY ANEMIA DURING PREGNANCY: ICD-10-CM

## 2023-11-10 DIAGNOSIS — Z3A.30 30 WEEKS GESTATION OF PREGNANCY: Primary | ICD-10-CM

## 2023-11-10 PROCEDURE — 63710000001 DIPHENHYDRAMINE PER 50 MG: Performed by: STUDENT IN AN ORGANIZED HEALTH CARE EDUCATION/TRAINING PROGRAM

## 2023-11-10 PROCEDURE — 25010000002 IRON SUCROSE PER 1 MG: Performed by: STUDENT IN AN ORGANIZED HEALTH CARE EDUCATION/TRAINING PROGRAM

## 2023-11-10 PROCEDURE — 96365 THER/PROPH/DIAG IV INF INIT: CPT

## 2023-11-10 PROCEDURE — 25810000003 SODIUM CHLORIDE 0.9 % SOLUTION 250 ML FLEX CONT: Performed by: STUDENT IN AN ORGANIZED HEALTH CARE EDUCATION/TRAINING PROGRAM

## 2023-11-10 PROCEDURE — 96366 THER/PROPH/DIAG IV INF ADDON: CPT

## 2023-11-10 RX ORDER — DIPHENHYDRAMINE HYDROCHLORIDE 50 MG/ML
25 INJECTION INTRAMUSCULAR; INTRAVENOUS WEEKLY
OUTPATIENT
Start: 2023-11-17

## 2023-11-10 RX ORDER — FAMOTIDINE 20 MG/1
20 TABLET, FILM COATED ORAL WEEKLY
Status: DISCONTINUED | OUTPATIENT
Start: 2023-11-10 | End: 2023-11-12 | Stop reason: HOSPADM

## 2023-11-10 RX ORDER — DIPHENHYDRAMINE HYDROCHLORIDE 50 MG/ML
50 INJECTION INTRAMUSCULAR; INTRAVENOUS AS NEEDED
Status: DISCONTINUED | OUTPATIENT
Start: 2023-11-10 | End: 2023-11-12 | Stop reason: HOSPADM

## 2023-11-10 RX ORDER — ACETAMINOPHEN 325 MG/1
650 TABLET ORAL WEEKLY
Status: DISCONTINUED | OUTPATIENT
Start: 2023-11-10 | End: 2023-11-12 | Stop reason: HOSPADM

## 2023-11-10 RX ORDER — FAMOTIDINE 10 MG/ML
20 INJECTION, SOLUTION INTRAVENOUS AS NEEDED
Status: DISCONTINUED | OUTPATIENT
Start: 2023-11-10 | End: 2023-11-12 | Stop reason: HOSPADM

## 2023-11-10 RX ORDER — DIPHENHYDRAMINE HCL 25 MG
25 CAPSULE ORAL WEEKLY
OUTPATIENT
Start: 2023-11-17

## 2023-11-10 RX ORDER — FAMOTIDINE 10 MG/ML
20 INJECTION, SOLUTION INTRAVENOUS WEEKLY
OUTPATIENT
Start: 2023-11-17

## 2023-11-10 RX ORDER — FAMOTIDINE 10 MG/ML
20 INJECTION, SOLUTION INTRAVENOUS AS NEEDED
OUTPATIENT
Start: 2023-11-17

## 2023-11-10 RX ORDER — SODIUM CHLORIDE 9 MG/ML
250 INJECTION, SOLUTION INTRAVENOUS ONCE
Status: DISCONTINUED | OUTPATIENT
Start: 2023-11-10 | End: 2023-11-12 | Stop reason: HOSPADM

## 2023-11-10 RX ORDER — ACETAMINOPHEN 325 MG/1
650 TABLET ORAL WEEKLY
OUTPATIENT
Start: 2023-11-17

## 2023-11-10 RX ORDER — SODIUM CHLORIDE 9 MG/ML
250 INJECTION, SOLUTION INTRAVENOUS ONCE
OUTPATIENT
Start: 2023-11-17

## 2023-11-10 RX ORDER — DIPHENHYDRAMINE HYDROCHLORIDE 50 MG/ML
50 INJECTION INTRAMUSCULAR; INTRAVENOUS AS NEEDED
OUTPATIENT
Start: 2023-11-17

## 2023-11-10 RX ORDER — FAMOTIDINE 20 MG/1
20 TABLET, FILM COATED ORAL WEEKLY
OUTPATIENT
Start: 2023-11-17

## 2023-11-10 RX ORDER — DIPHENHYDRAMINE HCL 25 MG
25 CAPSULE ORAL WEEKLY
Status: DISCONTINUED | OUTPATIENT
Start: 2023-11-10 | End: 2023-11-12 | Stop reason: HOSPADM

## 2023-11-10 RX ADMIN — ACETAMINOPHEN 650 MG: 325 TABLET, FILM COATED ORAL at 10:50

## 2023-11-10 RX ADMIN — DIPHENHYDRAMINE HYDROCHLORIDE 25 MG: 25 CAPSULE ORAL at 10:50

## 2023-11-10 RX ADMIN — IRON SUCROSE 300 MG: 20 INJECTION, SOLUTION INTRAVENOUS at 11:28

## 2023-11-10 RX ADMIN — FAMOTIDINE 20 MG: 20 TABLET, FILM COATED ORAL at 10:50

## 2023-11-14 ENCOUNTER — PATIENT OUTREACH (OUTPATIENT)
Dept: LABOR AND DELIVERY | Facility: HOSPITAL | Age: 28
End: 2023-11-14
Payer: COMMERCIAL

## 2023-11-14 NOTE — OUTREACH NOTE
[facility]  Check-In    Current Estimated Gestational Age: 33w3d      Questions/Answers      Flowsheet Row Responses   Best Method for Contacting Cell   Demographics Reviewed Yes   Currently Employed Yes   Able to keep appointments as scheduled No   Gender(s) and Name(s) girl   Baby Active/Feeling Fetal Movemen Yes   How are you presently feeling? good   Supplies ready for baby Car Seat, Clothing, Crib, Diapers, Feeding Supplies   Resource/Environmental Concerns Reliable Transportation   Do you have any questions related to your care experience, your pregnancy, plans for delivery, any concerns, etc? No   Other Education WIC Benefits, Insurance benefits/Incentives, Transportation Assistance          Missed last appt due to her daughter being sick. Will need transportation assistance, rides scheduled to infusion and appt next week. She now has all necessary infant supplies. May still need to get WIC inpatient. Reviewed labor precautions.     Sofy Lomeli RN  Maternity Nurse Navigator    11/14/2023, 12:49 EST

## 2023-11-21 ENCOUNTER — HOSPITAL ENCOUNTER (OUTPATIENT)
Facility: HOSPITAL | Age: 28
Setting detail: SURGERY ADMIT
Discharge: HOME | End: 2023-11-21
Attending: STUDENT IN AN ORGANIZED HEALTH CARE EDUCATION/TRAINING PROGRAM | Admitting: OBSTETRICS & GYNECOLOGY
Payer: COMMERCIAL

## 2023-11-21 VITALS
WEIGHT: 149 LBS | OXYGEN SATURATION: 99 % | DIASTOLIC BLOOD PRESSURE: 66 MMHG | HEART RATE: 91 BPM | HEIGHT: 63 IN | SYSTOLIC BLOOD PRESSURE: 105 MMHG | BODY MASS INDEX: 26.4 KG/M2 | RESPIRATION RATE: 16 BRPM | TEMPERATURE: 98.7 F

## 2023-11-21 LAB
BACTERIA UR QL AUTO: ABNORMAL /HPF
BILIRUB UR QL STRIP: NEGATIVE
CLARITY UR: CLEAR
COLOR UR: YELLOW
GLUCOSE UR STRIP-MCNC: NEGATIVE MG/DL
HGB UR QL STRIP.AUTO: NEGATIVE
HYALINE CASTS UR QL AUTO: ABNORMAL /LPF
KETONES UR QL STRIP: NEGATIVE
LEUKOCYTE ESTERASE UR QL STRIP.AUTO: ABNORMAL
NITRITE UR QL STRIP: NEGATIVE
PH UR STRIP.AUTO: 7 [PH] (ref 5–8)
PROT UR QL STRIP: NEGATIVE
RBC # UR STRIP: ABNORMAL /HPF
REF LAB TEST METHOD: ABNORMAL
SP GR UR STRIP: 1.01 (ref 1–1.03)
SQUAMOUS #/AREA URNS HPF: ABNORMAL /HPF
UROBILINOGEN UR QL STRIP: ABNORMAL
WBC # UR STRIP: ABNORMAL /HPF

## 2023-11-21 PROCEDURE — 81001 URINALYSIS AUTO W/SCOPE: CPT | Performed by: OBSTETRICS & GYNECOLOGY

## 2023-11-21 PROCEDURE — 99284 EMERGENCY DEPT VISIT MOD MDM: CPT | Performed by: OBSTETRICS & GYNECOLOGY

## 2023-11-21 PROCEDURE — 87147 CULTURE TYPE IMMUNOLOGIC: CPT | Performed by: OBSTETRICS & GYNECOLOGY

## 2023-11-21 PROCEDURE — 59025 FETAL NON-STRESS TEST: CPT

## 2023-11-21 PROCEDURE — 87086 URINE CULTURE/COLONY COUNT: CPT | Performed by: OBSTETRICS & GYNECOLOGY

## 2023-11-22 ENCOUNTER — ROUTINE PRENATAL (OUTPATIENT)
Dept: OBSTETRICS AND GYNECOLOGY | Age: 28
End: 2023-11-22
Payer: COMMERCIAL

## 2023-11-22 VITALS — DIASTOLIC BLOOD PRESSURE: 62 MMHG | BODY MASS INDEX: 25.65 KG/M2 | WEIGHT: 144.8 LBS | SYSTOLIC BLOOD PRESSURE: 110 MMHG

## 2023-11-22 DIAGNOSIS — Z3A.34 34 WEEKS GESTATION OF PREGNANCY: Primary | ICD-10-CM

## 2023-11-22 DIAGNOSIS — R87.612 LGSIL ON PAP SMEAR OF CERVIX: ICD-10-CM

## 2023-11-22 DIAGNOSIS — O21.9 NAUSEA AND VOMITING DURING PREGNANCY: ICD-10-CM

## 2023-11-22 DIAGNOSIS — R82.71 GBS BACTERIURIA: ICD-10-CM

## 2023-11-22 DIAGNOSIS — O09.299 HISTORY OF POSTPARTUM HEMORRHAGE, CURRENTLY PREGNANT: ICD-10-CM

## 2023-11-22 DIAGNOSIS — Z86.19 H/O SYPHILIS: ICD-10-CM

## 2023-11-22 DIAGNOSIS — O99.019 IRON DEFICIENCY ANEMIA DURING PREGNANCY: ICD-10-CM

## 2023-11-22 DIAGNOSIS — O09.212 CURRENT PREGNANCY WITH HISTORY OF PRE-TERM LABOR IN SECOND TRIMESTER: ICD-10-CM

## 2023-11-22 DIAGNOSIS — Z13.89 SCREENING FOR HEMATURIA OR PROTEINURIA: ICD-10-CM

## 2023-11-22 DIAGNOSIS — D50.9 IRON DEFICIENCY ANEMIA DURING PREGNANCY: ICD-10-CM

## 2023-11-22 DIAGNOSIS — O34.211 MATERNAL CARE DUE TO LOW TRANSVERSE UTERINE SCAR FROM PREVIOUS CESAREAN DELIVERY: ICD-10-CM

## 2023-11-22 DIAGNOSIS — Z87.59 HISTORY OF PRIOR PREGNANCY WITH IUGR NEWBORN: ICD-10-CM

## 2023-11-22 PROBLEM — R30.0 DYSURIA: Status: RESOLVED | Noted: 2023-10-25 | Resolved: 2023-11-22

## 2023-11-22 LAB — BACTERIA SPEC AEROBE CULT: ABNORMAL

## 2023-11-22 NOTE — PROGRESS NOTES
Chiquita Galvez, a 28 y.o.  at 34w4d, presents for OB follow-up.  She is doing well today.  Denies loss of fluid, vaginal bleeding.  Reports intermittent contractions with pelvic pressure.  Endorses fetal movements but feels like it has been less than usual.  Went to triage last night and NST was reactive, cervix 1cm and without change.    Objective  BP Readings from Last 3 Encounters:   23 110/62   23 105/66   11/10/23 102/63      Wt Readings from Last 3 Encounters:   23 65.7 kg (144 lb 12.8 oz)   23 67.6 kg (149 lb)   10/25/23 64.2 kg (141 lb 9.6 oz)      Total weight gain this pregnancy: 8.981 kg (19 lb 12.8 oz)    General: Awake, alert, no apparent distress  Respiratory: No increased work of breathing  Abdomen: Fundus soft and nontender  Extremity: Nontender, no edema    A/P  Diagnoses and all orders for this visit:    1. 34 weeks gestation of pregnancy (Primary)  Overview:  -PNL: CBC/HIV/RPR/HepC today, plan for GBS at next visit  -Genetics: cfDNA/carrier/msAFP neg, see cfDNA above, anatomy US normal  -Imm: RI, VI, s/p tdap, flu shot today  -Contraception: Mirena IUD at time of c/s  -Birthplan: RCS, bottle  -Care coordination: accepts blood transfusions, no latex allergy, call schedule reviewed      Orders:  -     Cancel: POC Urinalysis Dipstick  -     HIV-1 / O / 2 Ag / Antibody  -     RPR, Rfx Qn RPR / Confirm TP  -     HCV Antibody Rfx To Qnt PCR  -     CBC (No Diff)    2. Screening for hematuria or proteinuria  -     Cancel: POC Urinalysis Dipstick    3. LGSIL on Pap smear of cervix  Overview:  -20: LSIL pap, did not get planned colpo postpartum  -23: NIL      4. GBS bacteriuria  Overview:  S/p tx with macrobid  Repeat urine cx neg  Planning RCS      5. Maternal care due to low transverse uterine scar from previous  delivery  Overview:  -prior c/s x 3  -first for NRFS at 34wga, op note reviewed by Dr. Mckeon and noted to be low transverse  -second RCS at term  for asymmetric IUGR and fetal decels on Us, also low transverse, op note reviewed  -third RCS at 34wga for NRFS, op note reviewed  -plan for RLTCS with Mirena IUD insertion, scheduled 23      6. History of postpartum hemorrhage, currently pregnant  Overview:  -preg in , received 4 units PRBC   -Abs neg  -PPH precautions at delivery      7. Current pregnancy with history of pre-term labor in second trimester  Overview:  -h/o PTL at 34 weeks, delivered via c/s for non-reassuring fetal status (BPP 4/10)  -subsequent pregnancy and 5 prior all delivered at term  -last delivery at 34wga for NRFS incidentally found in triage, no labor sx      8. History of prior pregnancy with IUGR   Overview:  Plan for serial growth Us > 28wga, normal at 30wga, repeat US today      9. Nausea and vomiting during pregnancy  Overview:  -Rx zofran sent      10. Iron deficiency anemia during pregnancy  Overview:  -H/o severe anemia requiring blood transfusions in prior preg  -Hb electro normal  -Hb 7.9, iron deficit 1200mg  -S/p IV iron x 3, was planning for 5 total but missed last 2 doses  -Repeat CBC today to determine need for additional infusions    Orders:  -     CBC (No Diff)    11. H/O syphilis  Overview:  -H/o Syphilis many yrs ago, s/p tx  -Ab titer stable at 1:1      Other orders  -     Fluzone >6 Months (3005-9203)      BPP 8/, growth US normal, continue FKC  Labor precautions reviewed  Return in about 2 weeks (around 2023) for Next f/u.    Heather Eckert MD

## 2023-11-22 NOTE — OBED NOTES
[facility] Note OB        Patient Name: Chiquita Galvez  YOB: 1995  MRN: [id number]  Admission Date: 2023  6:41 PM  Date of Service: 2023    Chief Complaint: Contractions (Pt states that contractions started today around 11am, and have come and gone all day, last hour Q6-7 mins. Pain 8/10. +FM. Denies LOF/VB)        Subjective     Chiquita Galvez is a 28 y.o. female  at 34w3d with Estimated Date of Delivery: 23 who presents with the chief complaint listed above.  She sees Heather Eckert MD for her prenatal care. Her pregnancy has been complicated by:   grand multiparity, previous  section x 3, history of two  deliveries , anemia, GBS bacteriuria, history of syphilis with stable titers.    She describes fetal movement as normal.  She denies rupture of membranes.  She denies vaginal bleeding. She is feeling contractions.          Objective   Patient Active Problem List    Diagnosis     *Maternal care due to low transverse uterine scar from previous  delivery [O34.211]     GBS bacteriuria [R82.71]     Dysuria [R30.0]     H/O syphilis [Z86.19]     History of prior pregnancy with IUGR  [Z87.59]     Nausea and vomiting during pregnancy [O21.9]     30 weeks gestation of pregnancy [Z3A.30]     Current pregnancy with history of pre-term labor in second trimester [O09.212]     LGSIL on Pap smear of cervix [R87.612]     Iron deficiency anemia during pregnancy [O99.019, D50.9]     Iron adverse reaction [T45.4X5A]     History of postpartum hemorrhage, currently pregnant [O09.299]         OB History    Para Term  AB Living   9 7 5 2 1 7   SAB IAB Ectopic Molar Multiple Live Births   1 0 0 0 0 7      # Outcome Date GA Lbr Andrew/2nd Weight Sex Delivery Anes PTL Lv   9 Current            8  22 34w1d  1960 g (4 lb 5.1 oz) F CS-LTranv Spinal N RUTHY      Birth Comments: panda 1      Complications: Fetal Intolerance      Name:  DALJIT PEÑA      Apgar1: 8  Apgar5: 9   7 Term 20 37w4d  2765 g (6 lb 1.5 oz) M CS-LTranv Spinal N RUTHY      Birth Comments: Panda 1      Name: PIA PEÑA      Apgar1: 8  Apgar5: 8   6  17 34w1d  1990 g (4 lb 6.2 oz) F CS-LTranv EPI Y RUTHY      Birth Comments: [facility] 4/10       Complications: Abnormal  test      Name: ROSA PEÑA      Apgar1: 4  Apgar5: 8   5 Term 11/08/15 38w2d / 00:04 3626 g (7 lb 15.9 oz) M Vag-Spont EPI N RUTHY      Birth Comments: del note reviewed - [facility]       Complications: Postpartum hemorrhage      Apgar1: 8  Apgar5: 9   4 Term 14 40w0d  3555 g (7 lb 13.4 oz) F Vag-Spont None N RUTHY      Birth Comments: del note reviewed - no comps - [facility]       Apgar1: 8  Apgar5: 9   3 Term 13 40w3d  3720 g (8 lb 3.2 oz) M Vag-Spont EPI N RUTHY      Birth Comments: del note reviewed - no comps       Name: EDEN PEÑA      Apgar1: 8  Apgar5: 9   2 Term 12 40w0d  3742 g (8 lb 4 oz) M Vag-Spont EPI  RUTHY      Birth Comments: del note reviewed - no comps - [facility]    1 SAB 09 9w0d             Birth Comments: required D&C for retained placenta      Obstetric Comments   -10/16: CRL c/w 10+6wga, final MARISA 22   -: normal anatomy but several limited views, abnormal placenta   -: normal anatomy, posterior placenta, DVP 3.8cm, EFW 23%ile, AC 8%ile, CL 4.3cm        Past Medical History:   Diagnosis Date    History of anemia     pt states been recieving Fe infusions with current pregnancy    History of transfusion     multple blood transfusions during PREVIOUS pregnancy due to severe anemia. Iron infusions prior to delivery and preventative blood transfusion after deliveries with all pregnancies.    History of urinary tract infection     Migraine     Syphilis 2019    [facility]       Past Surgical History:   Procedure Laterality Date     SECTION  2017     SECTION N/A  3/18/2020    Procedure:  SECTION REPEAT;  Surgeon: Bisi Bermudez MD;  Location:  [facility];  Service: Obstetrics/Gynecology;  Laterality: N/A;     SECTION Bilateral 3/28/2022    Procedure:  SECTION PRIMARY;  Surgeon: Brooks Irwin MD;  Location: [facility] LABOR DELIVERY;  Service: Obstetrics/Gynecology;  Laterality: Bilateral;    DILATATION AND CURETTAGE      Miscarriage       No current facility-administered medications on file prior to encounter.     Current Outpatient Medications on File Prior to Encounter   Medication Sig Dispense Refill    acetaminophen (TYLENOL) 500 MG tablet Take 1 tablet by mouth Every 6 (Six) Hours As Needed for Mild Pain. 60 tablet 1    ferrous sulfate 325 (65 FE) MG tablet Take 1 tablet by mouth 2 (Two) Times a Day. 60 tablet 11    Prenatal Vit-Fe Fumarate-FA (Prenatal Vitamin) 27-0.8 MG tablet Take 1 tablet by mouth Daily. 30 tablet 11    Levonorgestrel (Mirena, 52 MG,) 20 MCG/DAY intrauterine device IUD To be inserted one time by prescriber. Route intrauterine. 1 each 0       No Known Allergies    Family History   Problem Relation Age of Onset    Lupus Father     Cancer Mother     Breast cancer Neg Hx     Ovarian cancer Neg Hx     Colon cancer Neg Hx     Endometrial cancer Neg Hx     Uterine cancer Neg Hx        Social History     Socioeconomic History    Marital status:     Number of children: 5   Tobacco Use    Smoking status: Never     Passive exposure: Never    Smokeless tobacco: Never   Vaping Use    Vaping Use: Never used   Substance and Sexual Activity    Alcohol use: Never    Drug use: Yes     Frequency: 1.0 times per week     Types: Marijuana     Comment: Pt states she stopped when she was 3 months pregnant    Sexual activity: Yes     Partners: Male     Birth control/protection: None           Review of Systems   HEENT: negative  Pulmonary: negative  CV: negative  GI: negative  : contractions  Musculoskeletal: negative  Neuro:  "negative    PHYSICAL EXAM:      VITAL SIGNS:  Vitals:    11/21/23 1904   BP: 105/66   BP Location: Right arm   Patient Position: Sitting   Pulse: 91   Resp: 16   Temp: 98.7 °F (37.1 °C)   TempSrc: Oral   SpO2: 99%   Weight: 67.6 kg (149 lb)   Height: 160 cm (63\")        FHT'S:                   Baseline:  130 BPM  Variability:  Moderate = 6 - 25 BPM  Accelerations:  15 x 15 accelerations present     Decelerations:  absent  Contractions:   irritable    Interpretation:    Reactive NST, CAT 1 tracing        PHYSICAL EXAM:    General: well developed; well nourished  no acute distress   Heart: Not performed.   Lungs  : breathing is unlabored     Abdomen: soft, non-tender; no masses       Cervix: was checked (by RN): 1 cm / 50 % / -2  Cervical Dilation (cm): 1  Cervical Effacement: 50%  Fetal Station: -2  Cervical Consistency: soft  Cervical Position: posterior   Contractions: irregular        Extremities: peripheral pulses normal, no pedal edema, no clubbing or cyanosis      LABS AND TESTING ORDERED:  Uterine and fetal monitoring  Urinalysis  Serial pelvic exams    LAB RESULTS:    Recent Results (from the past 24 hour(s))   Urinalysis With Culture If Indicated - Urine, Clean Catch    Collection Time: 11/21/23  7:19 PM    Specimen: Urine, Clean Catch   Result Value Ref Range    Color, UA Yellow Yellow, Straw    Appearance, UA Clear Clear    pH, UA 7.0 5.0 - 8.0    Specific Gravity, UA 1.007 1.005 - 1.030    Glucose, UA Negative Negative    Ketones, UA Negative Negative    Bilirubin, UA Negative Negative    Blood, UA Negative Negative    Protein, UA Negative Negative    Leuk Esterase, UA Large (3+) (A) Negative    Nitrite, UA Negative Negative    Urobilinogen, UA 0.2 E.U./dL 0.2 - 1.0 E.U./dL   Urinalysis, Microscopic Only - Urine, Clean Catch    Collection Time: 11/21/23  7:19 PM    Specimen: Urine, Clean Catch   Result Value Ref Range    RBC, UA 0-2 None Seen, 0-2 /HPF    WBC, UA 21-50 (A) None Seen, 0-2 /HPF    " "Bacteria, UA None Seen None Seen /HPF    Squamous Epithelial Cells, UA 0-2 None Seen, 0-2 /HPF    Hyaline Casts, UA None Seen None Seen /LPF    Methodology Automated Microscopy        Lab Results   Component Value Date    ABO B 2023    RH Positive 2023       Lab Results   Component Value Date    STREPGPB Negative 2020                 Assessment & Plan     ASSESSMENT/PLAN:  Chiquita Galvez is a 28 y.o. female  at 34w3d who presented with: contractions/cramping          Final Impression:  Pregnancy at 34w3d  Reactive NST.  CAT 1 tracing  Irregular contractions  Possible UTI  Maternal vital signs were reviewed and were unremarkable              Vitals:    23 1904   BP: 105/66   BP Location: Right arm   Patient Position: Sitting   Pulse: 91   Resp: 16   Temp: 98.7 °F (37.1 °C)   TempSrc: Oral   SpO2: 99%   Weight: 67.6 kg (149 lb)   Height: 160 cm (63\")       Lab Results   Component Value Date    STREPGPB Negative 2020     Lab Results   Component Value Date    ABO B 2023    RH Positive 2023         PLAN:     Patient states that she is currently still taking the Macrobid prescribed by her Ob/gyn  No cervical change after 1 hour.  Patient discharged home with labor precautions.  She agrees to return to [facility] if her contractions worsen, if she has vaginal bleeding or leaking fluid or if she has decreased fetal movement.  Patient encouraged to keep her scheduled prenatal appointment.    I have spent 30 minutes including face to face time with the patient, greater than 50% in discussion of the diagnosis (counseling) and/or coordination of care.     Lisha Charlton MD  2023  20:31 EST  OB Hospitalist  Phone:  [phone]  "

## 2023-11-26 ENCOUNTER — HOSPITAL ENCOUNTER (EMERGENCY)
Facility: HOSPITAL | Age: 28
Discharge: HOME | End: 2023-11-26
Attending: OBSTETRICS & GYNECOLOGY | Admitting: OBSTETRICS & GYNECOLOGY
Payer: COMMERCIAL

## 2023-11-26 VITALS
DIASTOLIC BLOOD PRESSURE: 61 MMHG | TEMPERATURE: 98.8 F | HEART RATE: 88 BPM | SYSTOLIC BLOOD PRESSURE: 114 MMHG | BODY MASS INDEX: 25.69 KG/M2 | RESPIRATION RATE: 16 BRPM | HEIGHT: 63 IN | WEIGHT: 145 LBS | OXYGEN SATURATION: 100 %

## 2023-11-26 LAB
A1 MICROGLOB PLACENTAL VAG QL: NEGATIVE
BACTERIA UR QL AUTO: ABNORMAL /HPF
BILIRUB UR QL STRIP: NEGATIVE
CLARITY UR: CLEAR
COLOR UR: YELLOW
GLUCOSE UR STRIP-MCNC: NEGATIVE MG/DL
HGB UR QL STRIP.AUTO: NEGATIVE
HYALINE CASTS UR QL AUTO: ABNORMAL /LPF
KETONES UR QL STRIP: NEGATIVE
LEUKOCYTE ESTERASE UR QL STRIP.AUTO: ABNORMAL
NITRITE UR QL STRIP: NEGATIVE
PH UR STRIP.AUTO: 7 [PH] (ref 5–8)
PROT UR QL STRIP: NEGATIVE
RBC # UR STRIP: ABNORMAL /HPF
REF LAB TEST METHOD: ABNORMAL
SP GR UR STRIP: 1.01 (ref 1–1.03)
SQUAMOUS #/AREA URNS HPF: ABNORMAL /HPF
UROBILINOGEN UR QL STRIP: ABNORMAL
WBC # UR STRIP: ABNORMAL /HPF

## 2023-11-26 PROCEDURE — 59025 FETAL NON-STRESS TEST: CPT

## 2023-11-26 PROCEDURE — 84112 EVAL AMNIOTIC FLUID PROTEIN: CPT | Performed by: OBSTETRICS & GYNECOLOGY

## 2023-11-26 PROCEDURE — 87086 URINE CULTURE/COLONY COUNT: CPT | Performed by: OBSTETRICS & GYNECOLOGY

## 2023-11-26 PROCEDURE — 99284 EMERGENCY DEPT VISIT MOD MDM: CPT | Performed by: OBSTETRICS & GYNECOLOGY

## 2023-11-26 PROCEDURE — 81001 URINALYSIS AUTO W/SCOPE: CPT | Performed by: OBSTETRICS & GYNECOLOGY

## 2023-11-26 NOTE — H&P
[facility]  Chiquita Galvez  : 1995  MRN: [id number]  CSN: [id number]    OB ED Provider Note    Subjective   Chief Complaint   Patient presents with    Rupture of Membranes     Pt presents to [facility]. States that she felt a gush of fluid one hour ago following using bathroom. Endorses some irregular ctx, denies bleeding. +FM.     Chiquita Galvez is a 28 y.o. year old  with an Estimated Date of Delivery: 23 currently at 35w1d presenting with complaint of feeling small gush of fluid a couple hours ago. She denies further leaking or having to change underwear. She reports irregular contractions but not particularly painful. She reports normal FM.    Prenatal care has been with h/o C/S x 3, PTB x 2.     OB History    Para Term  AB Living   9 7 5 2 1 7   SAB IAB Ectopic Molar Multiple Live Births   1 0 0 0 0 7      # Outcome Date GA Lbr Andrew/2nd Weight Sex Delivery Anes PTL Lv   9 Current            8  22 34w1d  1960 g (4 lb 5.1 oz) F CS-LTranv Spinal N RUTHY      Birth Comments: panda 1      Complications: Fetal Intolerance      Name: DALJIT GALVEZ      Apgar1: 8  Apgar5: 9   7 Term 20 37w4d  2765 g (6 lb 1.5 oz) M CS-LTranv Spinal N RUTHY      Birth Comments: Panda 1      Name: PIA GALVEZ      Apgar1: 8  Apgar5: 8   6  17 34w1d  1990 g (4 lb 6.2 oz) F CS-LTranv EPI Y RUTHY      Birth Comments: [facility] 4/10       Complications: Abnormal  test      Name: ROSA GALVEZ      Apgar1: 4  Apgar5: 8   5 Term 11/08/15 38w2d / 00:04 3626 g (7 lb 15.9 oz) M Vag-Spont EPI N RUTHY      Birth Comments: del note reviewed - [facility]       Complications: Postpartum hemorrhage      Apgar1: 8  Apgar5: 9   4 Term 14 40w0d  3555 g (7 lb 13.4 oz) F Vag-Spont None N RUTHY      Birth Comments: del note reviewed - no comps - [facility]       Apgar1: 8  Apgar5: 9   3 Term 13 40w3d  3720 g (8 lb 3.2 oz) M Vag-Spont EPI N RUTHY      Birth Comments: del note  reviewed - no comps       Name: EDEN PEÑA      Apgar1: 8  Apgar5: 9   2 Term 12 40w0d  3742 g (8 lb 4 oz) M Vag-Spont EPI  RUTHY      Birth Comments: del note reviewed - no comps - [facility]    1 SAB 09 9w0d             Birth Comments: required D&C for retained placenta      Obstetric Comments   -10/16: CRL c/w 10+6wga, final MARISA 22   -: normal anatomy but several limited views, abnormal placenta   -: normal anatomy, posterior placenta, DVP 3.8cm, EFW 23%ile, AC 8%ile, CL 4.3cm     Past Medical History:   Diagnosis Date    Syphilis     [facility]    History of anemia     pt states been recieving Fe infusions with current pregnancy    History of transfusion     multple blood transfusions during PREVIOUS pregnancy due to severe anemia. Iron infusions prior to delivery and preventative blood transfusion after deliveries with all pregnancies.    History of urinary tract infection     Migraine      Past Surgical History:   Procedure Laterality Date    DILATATION AND CURETTAGE      Miscarriage     SECTION  2017     SECTION N/A 3/18/2020    Procedure:  SECTION REPEAT;  Surgeon: Bisi Bermudez MD;  Location: [facility] LABOR DELIVERY;  Service: Obstetrics/Gynecology;  Laterality: N/A;     SECTION Bilateral 3/28/2022    Procedure:  SECTION PRIMARY;  Surgeon: Brooks Irwin MD;  Location: [facility] LABOR DELIVERY;  Service: Obstetrics/Gynecology;  Laterality: Bilateral;     No current facility-administered medications for this encounter.    No Known Allergies  Social History    Tobacco Use      Smoking status: Never      Smokeless tobacco: Never    Review of Systems      Objective   /61   Temp 98.8 °F (37.1 °C) (Oral)   Resp 16   LMP 2022 (Exact Date)   SpO2 100%   General: well developed; well nourished  no acute distress   Abdomen: soft, non-tender; no masses  gravid    FHT's: NST  reactive, 140, moderate, +accels, no decels  [place]: quiet      Cervix: 1/20 per RN       Contractions: irregular   Chest: Unlabored respirations    CV:  normal rate, regular rhythm,  no murmurs, rubs, or gallops   Ext:   No C/C/E   Back: No CVA tenderness       Prenatal Labs  Lab Results   Component Value Date    HGB 7.9 (L) 09/27/2023    RUBELLAABIGG 7.31 06/08/2023    HEPBSAG Negative 06/08/2023    ABORH B Positive 01/23/2020    ABSCRN Negative 06/08/2023    DJT0EJA2 Non Reactive 06/08/2023    HEPCVIRUSABY Non Reactive 06/08/2023    GCT 53 (L) 09/27/2023    GUZ3FVDF 110 02/20/2020    STREPGPB Negative 03/11/2020    URINECX 25,000 CFU/mL Streptococcus agalactiae (Group B) (A) 11/21/2023    CHLAMNAA Negative 06/08/2023    NGONORRHON Negative 06/08/2023       Current Labs Reviewed          Assessment and Plan  IUP at 35w1d w/ leaking  Actiprom neg  Fetal status reassuring, NST reactive  H/o C/S x 3  Will discharge home w/ f/u in Ob/Gyn office       Isi Wyman MD  11/26/2023  17:48 EST

## 2023-11-27 LAB
BACTERIA SPEC AEROBE CULT: NORMAL
HCT VFR BLD AUTO: 35.9 % (ref 34–46.6)
HCV AB SERPL QL IA: NORMAL
HCV IGG SERPL QL IA: NON REACTIVE
HGB BLD-MCNC: 11.5 G/DL (ref 11.1–15.9)
HIV 1+2 AB+HIV1 P24 AG SERPL QL IA: NON REACTIVE
MCH RBC QN AUTO: 24.7 PG (ref 26.6–33)
MCHC RBC AUTO-ENTMCNC: 32 G/DL (ref 31.5–35.7)
MCV RBC AUTO: 77 FL (ref 79–97)
PLATELET # BLD AUTO: 243 X10E3/UL (ref 150–450)
RBC # BLD AUTO: 4.66 X10E6/UL (ref 3.77–5.28)
RPR SER QL: REACTIVE
RPR SER-TITR: ABNORMAL TITER
TREPONEMA PALLIDUM IGG+IGM AB [PRESENCE] IN SERUM OR PLASMA BY IMMUNOASSAY: REACTIVE
WBC # BLD AUTO: 8.9 X10E3/UL (ref 3.4–10.8)

## 2023-12-06 ENCOUNTER — ROUTINE PRENATAL (OUTPATIENT)
Dept: OBSTETRICS AND GYNECOLOGY | Age: 28
End: 2023-12-06
Payer: COMMERCIAL

## 2023-12-06 VITALS — DIASTOLIC BLOOD PRESSURE: 64 MMHG | WEIGHT: 151 LBS | BODY MASS INDEX: 26.75 KG/M2 | SYSTOLIC BLOOD PRESSURE: 118 MMHG

## 2023-12-06 DIAGNOSIS — Z86.19 H/O SYPHILIS: ICD-10-CM

## 2023-12-06 DIAGNOSIS — O34.211 MATERNAL CARE DUE TO LOW TRANSVERSE UTERINE SCAR FROM PREVIOUS CESAREAN DELIVERY: Primary | ICD-10-CM

## 2023-12-06 DIAGNOSIS — O09.299 HISTORY OF POSTPARTUM HEMORRHAGE, CURRENTLY PREGNANT: ICD-10-CM

## 2023-12-06 DIAGNOSIS — D50.9 IRON DEFICIENCY ANEMIA DURING PREGNANCY: ICD-10-CM

## 2023-12-06 DIAGNOSIS — O09.212 CURRENT PREGNANCY WITH HISTORY OF PRE-TERM LABOR IN SECOND TRIMESTER: ICD-10-CM

## 2023-12-06 DIAGNOSIS — O99.019 IRON DEFICIENCY ANEMIA DURING PREGNANCY: ICD-10-CM

## 2023-12-06 DIAGNOSIS — Z3A.36 36 WEEKS GESTATION OF PREGNANCY: ICD-10-CM

## 2023-12-06 DIAGNOSIS — Z87.59 HISTORY OF PRIOR PREGNANCY WITH IUGR NEWBORN: ICD-10-CM

## 2023-12-06 DIAGNOSIS — T45.4X5D ADVERSE EFFECT OF IRON, SUBSEQUENT ENCOUNTER: ICD-10-CM

## 2023-12-06 NOTE — PROGRESS NOTES
Chief Complaint   Patient presents with    Routine Prenatal Visit     36 week ob visit       HPI: 28 y.o.  at 36w4d gestation  She is doing well  Has good FM  Did receive RSV vaccine  Has scheduled csection already  [facility]  Has scheduled csection  Plan f/u in 1 wk  Call or L&D for ROM, DFM, bleeding      Vitals:    23 1222   BP: 118/64   Weight: 68.5 kg (151 lb)       ROS:  GI:  Negative  : na  Pulmonary: Negative     A/P  1. Intrauterine pregnancy at 36w4d   2. Pregnancy Risk:  HIGH RISK    Diagnoses and all orders for this visit:    1. Maternal care due to low transverse uterine scar from previous  delivery (Primary)    2. History of postpartum hemorrhage, currently pregnant    3. Current pregnancy with history of pre-term labor in second trimester    4. History of prior pregnancy with IUGR   Comments:  recent EFW wnl, 41%, AC 33%    5. Adverse effect of iron, subsequent encounter  Comments:  has received infusions    6. Iron deficiency anemia during pregnancy  Comments:  CBC (No Diff) (2023 12:42)  Hgb improved at last draw 11.5 g/dL    7. H/O syphilis    8. 36 weeks gestation of pregnancy        -----------------------  PLAN:   Return in about 1 week (around 2023) for ob visit.      NERISSA Luciano  2023 14:13 EST

## 2023-12-11 ENCOUNTER — PATIENT OUTREACH (OUTPATIENT)
Dept: LABOR AND DELIVERY | Facility: HOSPITAL | Age: 28
End: 2023-12-11
Payer: COMMERCIAL

## 2023-12-11 ENCOUNTER — ANESTHESIA EVENT (OUTPATIENT)
Dept: LABOR AND DELIVERY | Facility: HOSPITAL | Age: 28
End: 2023-12-11
Payer: COMMERCIAL

## 2023-12-11 ENCOUNTER — HOSPITAL ENCOUNTER (EMERGENCY)
Facility: HOSPITAL | Age: 28
Discharge: HOME | End: 2023-12-11
Attending: STUDENT IN AN ORGANIZED HEALTH CARE EDUCATION/TRAINING PROGRAM | Admitting: OBSTETRICS & GYNECOLOGY
Payer: COMMERCIAL

## 2023-12-11 VITALS
RESPIRATION RATE: 18 BRPM | DIASTOLIC BLOOD PRESSURE: 64 MMHG | HEART RATE: 83 BPM | TEMPERATURE: 98.7 F | SYSTOLIC BLOOD PRESSURE: 105 MMHG | BODY MASS INDEX: 26.75 KG/M2 | HEIGHT: 63 IN

## 2023-12-11 LAB
BACTERIA UR QL AUTO: ABNORMAL /HPF
BILIRUB UR QL STRIP: NEGATIVE
CLARITY UR: ABNORMAL
COLOR UR: YELLOW
GLUCOSE UR STRIP-MCNC: NEGATIVE MG/DL
HGB UR QL STRIP.AUTO: NEGATIVE
HYALINE CASTS UR QL AUTO: ABNORMAL /LPF
KETONES UR QL STRIP: NEGATIVE
LEUKOCYTE ESTERASE UR QL STRIP.AUTO: ABNORMAL
NITRITE UR QL STRIP: NEGATIVE
PH UR STRIP.AUTO: 7 [PH] (ref 5–8)
PROT UR QL STRIP: NEGATIVE
RBC # UR STRIP: ABNORMAL /HPF
REF LAB TEST METHOD: ABNORMAL
SP GR UR STRIP: 1.01 (ref 1–1.03)
SQUAMOUS #/AREA URNS HPF: ABNORMAL /HPF
UROBILINOGEN UR QL STRIP: ABNORMAL
WBC # UR STRIP: ABNORMAL /HPF

## 2023-12-11 PROCEDURE — 81001 URINALYSIS AUTO W/SCOPE: CPT | Performed by: OBSTETRICS & GYNECOLOGY

## 2023-12-11 PROCEDURE — 87086 URINE CULTURE/COLONY COUNT: CPT | Performed by: OBSTETRICS & GYNECOLOGY

## 2023-12-11 PROCEDURE — 59025 FETAL NON-STRESS TEST: CPT

## 2023-12-11 PROCEDURE — 99284 EMERGENCY DEPT VISIT MOD MDM: CPT | Performed by: OBSTETRICS & GYNECOLOGY

## 2023-12-11 NOTE — OUTREACH NOTE
[facility]  Check-In    Current Estimated Gestational Age: 37w2d      Questions/Answers      Flowsheet Row Responses   Best Method for Contacting Cell   Demographics Reviewed Yes   Able to keep appointments as scheduled No   Gender(s) and Name(s) girl   Baby Active/Feeling Fetal Movemen Yes   How are you presently feeling? good   Supplies ready for baby Car Seat, Clothing, Crib, Diapers, Feeding Supplies   Resource/Environmental Concerns Reliable Transportation   Do you have any questions related to your care experience, your pregnancy, plans for delivery, any concerns, etc? No   Other Education WIC Benefits, Insurance benefits/Incentives, Transportation Assistance          Pt reports intermittent contractions, states that it has been stressful to get the other kids settled and find transportation to the hospital for labor checks. She has appt Wed and requests transportation assistance, rides scheduled. She has all necessary infant items but we will need to try to get WIC while IP. Reviewed labor precautions. F/u IP after delivery.     Sofy Lomeli RN  Maternity Nurse Navigator    12/11/2023, 15:25 EST

## 2023-12-11 NOTE — OBED NOTES
JONO Note OB        Patient Name: Chiquita Galvez  YOB: 1995  MRN: [id number]  Admission Date: 2023 10:13 AM  Date of Service: 2023    Chief Complaint: Contractions (JONO with complaints of contractions, states they started last night but have been 7-8 minutes apart since 5am. She reports the are a 10/10 on the pain scale. + FM, denies LOF.)        Subjective     Chiquita Galvez is a 28 y.o. female  at 37w2d with Estimated Date of Delivery: 23 who presents with the chief complaint listed above.  She sees Heather Eckert MD for her prenatal care. Her pregnancy has been complicated by:   grand multiparity, previous  x 3, GBS bacteruria, history of syphilis, history of postpartum hemorrhage, iron deficiency anemia, abnormal pap smear .        She describes fetal movement as normal.  She denies rupture of membranes.  She denies vaginal bleeding. She is feeling contractions.          Objective   Patient Active Problem List    Diagnosis     *Maternal care due to low transverse uterine scar from previous  delivery [O34.211]     GBS bacteriuria [R82.71]     H/O syphilis [Z86.19]     History of prior pregnancy with IUGR  [Z87.59]     Nausea and vomiting during pregnancy [O21.9]     34 weeks gestation of pregnancy [Z3A.34]     Current pregnancy with history of pre-term labor in second trimester [O09.212]     LGSIL on Pap smear of cervix [R87.612]     Iron deficiency anemia during pregnancy [O99.019, D50.9]     Iron adverse reaction [T45.4X5A]     History of postpartum hemorrhage, currently pregnant [O09.299]         OB History    Para Term  AB Living   9 7 5 2 1 7   SAB IAB Ectopic Molar Multiple Live Births   1 0 0 0 0 7      # Outcome Date GA Lbr Andrew/2nd Weight Sex Delivery Anes PTL Lv   9 Current            8  22 34w1d  1960 g (4 lb 5.1 oz) F CS-LTranv Spinal N RUTHY      Birth Comments: panda 1      Complications: Fetal  Intolerance      Name: DALJIT PEÑA      Apgar1: 8  Apgar5: 9   7 Term 20 37w4d  2765 g (6 lb 1.5 oz) M CS-LTranv Spinal N RUTHY      Birth Comments: Panda 1      Name: PIA PEÑA      Apgar1: 8  Apgar5: 8   6  17 34w1d  1990 g (4 lb 6.2 oz) F CS-LTranv EPI Y RUTHY      Birth Comments: BPP 4/10       Complications: Abnormal  test      Name: ROSA PEÑA      Apgar1: 4  Apgar5: 8   5 Term 11/08/15 38w2d / 00:04 3626 g (7 lb 15.9 oz) M Vag-Spont EPI N RUTHY      Birth Comments: del note reviewed - JHF       Complications: Postpartum hemorrhage      Apgar1: 8  Apgar5: 9   4 Term 14 40w0d  3555 g (7 lb 13.4 oz) F Vag-Spont None N RUTHY      Birth Comments: del note reviewed - no comps - JF       Apgar1: 8  Apgar5: 9   3 Term 13 40w3d  3720 g (8 lb 3.2 oz) M Vag-Spont EPI N RUTHY      Birth Comments: del note reviewed - no comps       Name: EDEN PEÑA      Apgar1: 8  Apgar5: 9   2 Term 12 40w0d  3742 g (8 lb 4 oz) M Vag-Spont EPI  RUTHY      Birth Comments: del note reviewed - no comps - JHF    1 SAB 09 9w0d             Birth Comments: required D&C for retained placenta      Obstetric Comments   -10/16: CRL c/w 10+6wga, final MARISA 22   -: normal anatomy but several limited views, abnormal placenta   -: normal anatomy, posterior placenta, DVP 3.8cm, EFW 23%ile, AC 8%ile, CL 4.3cm        Past Medical History:   Diagnosis Date    History of anemia     pt states been recieving Fe infusions with current pregnancy    History of transfusion     multple blood transfusions during PREVIOUS pregnancy due to severe anemia. Iron infusions prior to delivery and preventative blood transfusion after deliveries with all pregnancies.    History of urinary tract infection     Migraine     Syphilis 2019    [facility]       Past Surgical History:   Procedure Laterality Date     SECTION  2017      SECTION N/A 3/18/2020    Procedure:  SECTION REPEAT;  Surgeon: Bisi Bermudez MD;  Location:  [facility];  Service: Obstetrics/Gynecology;  Laterality: N/A;     SECTION Bilateral 3/28/2022    Procedure:  SECTION PRIMARY;  Surgeon: Brooks Irwin MD;  Location: [facility] LAB[facility] DELIVERY;  Service: Obstetrics/Gynecology;  Laterality: Bilateral;    DILATATION AND CURETTAGE      Miscarriage       No current facility-administered medications on file prior to encounter.     Current Outpatient Medications on File Prior to Encounter   Medication Sig Dispense Refill    acetaminophen (TYLENOL) 500 MG tablet Take 1 tablet by mouth Every 6 (Six) Hours As Needed for Mild Pain. 60 tablet 1    Prenatal Vit-Fe Fumarate-FA (Prenatal Vitamin) 27-0.8 MG tablet Take 1 tablet by mouth Daily. 30 tablet 11       No Known Allergies    Family History   Problem Relation Age of Onset    Lupus Father     Cancer Mother     Breast cancer Neg Hx     Ovarian cancer Neg Hx     Colon cancer Neg Hx     Endometrial cancer Neg Hx     Uterine cancer Neg Hx        Social History     Socioeconomic History    Marital status:     Number of children: 5   Tobacco Use    Smoking status: Never     Passive exposure: Never    Smokeless tobacco: Never   Vaping Use    Vaping Use: Never used   Substance and Sexual Activity    Alcohol use: Never    Drug use: Yes     Frequency: 1.0 times per week     Types: Marijuana     Comment: Pt states she stopped when she was 3 months pregnant    Sexual activity: Yes     Partners: Male     Birth control/protection: None           Review of Systems   Constitutional:  Negative for chills, fatigue and fever.   HENT:  Negative for congestion, rhinorrhea and sore throat.    Eyes:  Negative for visual disturbance.   Respiratory: Negative.     Cardiovascular: Negative.    Gastrointestinal:  Positive for abdominal pain. Negative for constipation, diarrhea, nausea and vomiting.  "  Genitourinary:  Negative for difficulty urinating, dyspareunia, dysuria, flank pain, frequency, genital sores, hematuria, pelvic pain, urgency, vaginal bleeding, vaginal discharge and vaginal pain.   Neurological:  Negative for dizziness, seizures, light-headedness and headaches.   Psychiatric/Behavioral:  Negative for sleep disturbance. The patient is not nervous/anxious.           PHYSICAL EXAM:      VITAL SIGNS:  Vitals:    23 1032   BP: 105/64   BP Location: Right arm   Patient Position: Sitting   Pulse: 83   Resp: 18   Temp: 98.7 °F (37.1 °C)   TempSrc: Oral   Height: 160 cm (63\")        FHT'S:                   Baseline:  150 BPM  Variability:  Moderate = 6 - 25 BPM  Accelerations:  15 x 15 accelerations present     Decelerations:  absent  Contractions:   absent     Interpretation:    Reactive NST, CAT 1 tracing        PHYSICAL EXAM:    General: well developed; well nourished  no acute distress   Heart: Not performed.   Lungs  : breathing is unlabored     Abdomen: soft, non-tender; no masses  no umbilical or inguinal hernias are present  no hepato-splenomegaly       Cervix: was checked (by RN): 1 cm / 30 % / -3  Cervical Dilation (cm): 1-2  Cervical Effacement: 30%  Fetal Station: -3  Cervical Consistency: medium  Cervical Position: mid-position   Contractions: none        Extremities: peripheral pulses normal, no pedal edema, no clubbing or cyanosis      LABS AND TESTING ORDERED:  Uterine and fetal monitoring      LAB RESULTS:    No results found for this or any previous visit (from the past 24 hour(s)).    Lab Results   Component Value Date    ABO B 2023    RH Positive 2023       Lab Results   Component Value Date    STREPGPB Negative 2020                 Assessment & Plan     ASSESSMENT/PLAN:  Chiquita Galvez is a 28 y.o. female  at 37w2d who presented with: concern for labor.  Patient is not having regular contractions, and her cervix is unchanged from prior office exam.  " "We discussed expectant management and she was discharged home with return precautions given.          Final Impression:  Pregnancy at 37w2d  Reactive NST.  CAT 1 tracing  False labor  Maternal vital signs were reviewed and were unremarkable              Vitals:    12/11/23 1032   BP: 105/64   BP Location: Right arm   Patient Position: Sitting   Pulse: 83   Resp: 18   Temp: 98.7 °F (37.1 °C)   TempSrc: Oral   Height: 160 cm (63\")       Lab Results   Component Value Date    STREPGPB Negative 03/11/2020     Lab Results   Component Value Date    ABO B 06/08/2023    RH Positive 06/08/2023     COVID - 19 status unknown      PLAN:       I have spent 30 minutes including face to face time with the patient, greater than 50% in discussion of the diagnosis (counseling) and/or coordination of care.     Lucinda Barros MD  12/11/2023  10:49 EST  OB Hospitalist  Phone:  [phone]  "

## 2023-12-12 LAB — BACTERIA SPEC AEROBE CULT: NO GROWTH

## 2023-12-13 ENCOUNTER — ROUTINE PRENATAL (OUTPATIENT)
Dept: OBSTETRICS AND GYNECOLOGY | Age: 28
End: 2023-12-13
Payer: COMMERCIAL

## 2023-12-13 VITALS — WEIGHT: 156.6 LBS | BODY MASS INDEX: 27.74 KG/M2 | DIASTOLIC BLOOD PRESSURE: 60 MMHG | SYSTOLIC BLOOD PRESSURE: 108 MMHG

## 2023-12-13 DIAGNOSIS — R82.71 GBS BACTERIURIA: ICD-10-CM

## 2023-12-13 DIAGNOSIS — Z36.85 SCREENING, ANTENATAL, FOR STREPTOCOCCUS B: ICD-10-CM

## 2023-12-13 DIAGNOSIS — Z86.19 H/O SYPHILIS: ICD-10-CM

## 2023-12-13 DIAGNOSIS — O21.9 NAUSEA AND VOMITING DURING PREGNANCY: ICD-10-CM

## 2023-12-13 DIAGNOSIS — O99.019 IRON DEFICIENCY ANEMIA DURING PREGNANCY: ICD-10-CM

## 2023-12-13 DIAGNOSIS — O09.213 CURRENT PREGNANCY WITH HISTORY OF PRE-TERM LABOR IN THIRD TRIMESTER: ICD-10-CM

## 2023-12-13 DIAGNOSIS — Z87.59 HISTORY OF PRIOR PREGNANCY WITH IUGR NEWBORN: ICD-10-CM

## 2023-12-13 DIAGNOSIS — Z13.89 SCREENING FOR HEMATURIA OR PROTEINURIA: ICD-10-CM

## 2023-12-13 DIAGNOSIS — R87.612 LGSIL ON PAP SMEAR OF CERVIX: ICD-10-CM

## 2023-12-13 DIAGNOSIS — O34.211 MATERNAL CARE DUE TO LOW TRANSVERSE UTERINE SCAR FROM PREVIOUS CESAREAN DELIVERY: ICD-10-CM

## 2023-12-13 DIAGNOSIS — O09.299 HISTORY OF POSTPARTUM HEMORRHAGE, CURRENTLY PREGNANT: ICD-10-CM

## 2023-12-13 DIAGNOSIS — D50.9 IRON DEFICIENCY ANEMIA DURING PREGNANCY: ICD-10-CM

## 2023-12-13 DIAGNOSIS — Z3A.37 37 WEEKS GESTATION OF PREGNANCY: Primary | ICD-10-CM

## 2023-12-13 NOTE — PROGRESS NOTES
Chiquita Galvez, a 28 y.o.  at 37w4d, presents for OB follow-up.  She is doing well today.  Denies loss of fluid, vaginal bleeding.  Having intermittent contractions.  She has been to the hospital several times for contractions but cervix always days unchanged at 1 cm.  Endorses fetal movements.    Objective  BP Readings from Last 3 Encounters:   23 108/60   23 105/64   23 118/64      Wt Readings from Last 3 Encounters:   23 71 kg (156 lb 9.6 oz)   23 68.5 kg (151 lb)   23 65.8 kg (145 lb)      Total weight gain this pregnancy: 14.3 kg (31 lb 9.6 oz)    General: Awake, alert, no apparent distress  Respiratory: No increased work of breathing  Abdomen: Fundus soft and nontender  Extremity: Nontender, no edema    A/P  Diagnoses and all orders for this visit:    1. 37 weeks gestation of pregnancy (Primary)  Overview:  -PNL: CBC/HIV/RPR/HepC today, plan for GBS at next visit  -Genetics: cfDNA/carrier/msAFP neg, see cfDNA above, anatomy US normal  -Imm: RI, VI, s/p tdap, flu shot today  -Contraception: Mirena IUD at time of c/s  -Birthplan: RCS, bottle  -Care coordination: accepts blood transfusions, no latex allergy, call schedule reviewed      Orders:  -     Strep B Screen - Swab, Vaginal/Rectum  -     POC Urinalysis Dipstick    2. Screening for hematuria or proteinuria  -     POC Urinalysis Dipstick    3. Screening, , for Streptococcus B  -     Strep B Screen - Swab, Vaginal/Rectum    4. GBS bacteriuria  Overview:  S/p tx with macrobid  Repeat urine cx neg  Planning RCS      5. LGSIL on Pap smear of cervix  Overview:  -20: LSIL pap, did not get planned colpo postpartum  -23: NIL      6. Maternal care due to low transverse uterine scar from previous  delivery  Overview:  -prior c/s x 3  -first for NRFS at 34wga, op note reviewed by Dr. Mckeon and noted to be low transverse  -second RCS at term for asymmetric IUGR and fetal decels on Us, also low  transverse, op note reviewed  -third RCS at 34wga for NRFS, op note reviewed  -plan for RLTCS with Mirena IUD insertion, scheduled 23      7. History of postpartum hemorrhage, currently pregnant  Overview:  -preg in , received 4 units PRBC   -Abs neg  -PPH precautions at delivery      8. Current pregnancy with history of pre-term labor in third trimester  Overview:  -h/o PTL at 34 weeks, delivered via c/s for non-reassuring fetal status (BPP 4/10)  -subsequent pregnancy and 5 prior all delivered at term  -last delivery at 34wga for NRFS incidentally found in triage, no labor sx      9. History of prior pregnancy with IUGR   Overview:  Plan for serial growth Us > 28wga, normal at 34wga, repeat US at next visit      10. Nausea and vomiting during pregnancy  Overview:  -Rx zofran sent      11. Iron deficiency anemia during pregnancy  Overview:  -H/o severe anemia requiring blood transfusions in prior preg  -Hb electro normal  -S/p IV iron x 3, was planning for 5 total but missed last 2 doses  -Hb improved to 11.5, no further IV iron needed      12. H/O syphilis  Overview:  -H/o Syphilis many yrs ago, s/p tx  -Ab titer stable at 1:1          Labor precautions reviewed  Return in about 1 week (around 2023) for Next f/u with growth Us.    Heather Eckert MD

## 2023-12-18 ENCOUNTER — HOSPITAL ENCOUNTER (EMERGENCY)
Facility: HOSPITAL | Age: 28
Discharge: HOME | End: 2023-12-19
Attending: STUDENT IN AN ORGANIZED HEALTH CARE EDUCATION/TRAINING PROGRAM | Admitting: OBSTETRICS & GYNECOLOGY
Payer: COMMERCIAL

## 2023-12-18 ENCOUNTER — TELEPHONE (OUTPATIENT)
Dept: LABOR AND DELIVERY | Facility: HOSPITAL | Age: 28
End: 2023-12-18
Payer: COMMERCIAL

## 2023-12-18 VITALS
RESPIRATION RATE: 18 BRPM | HEIGHT: 63 IN | DIASTOLIC BLOOD PRESSURE: 62 MMHG | OXYGEN SATURATION: 99 % | WEIGHT: 157.6 LBS | HEART RATE: 93 BPM | TEMPERATURE: 98 F | SYSTOLIC BLOOD PRESSURE: 100 MMHG | BODY MASS INDEX: 27.93 KG/M2

## 2023-12-18 PROCEDURE — 99283 EMERGENCY DEPT VISIT LOW MDM: CPT | Performed by: OBSTETRICS & GYNECOLOGY

## 2023-12-18 NOTE — TELEPHONE ENCOUNTER
Pt called for transportation assistance this week. Rides scheduled for her u/s and OB appt and also to get her to the hospital for the c/s.

## 2023-12-19 PROCEDURE — 59025 FETAL NON-STRESS TEST: CPT

## 2023-12-19 NOTE — OBED NOTES
JONO Note OB        Patient Name: Alaina Galvez  YOB: 1995  MRN: [id number]  Admission Date: 2023 11:42 PM  Date of Service: 2023    Chief Complaint: Contractions (Pt comes into the jono c/o of ctx that has been going on x 2 hours. + fm noted denies anuy lof or vag bleeding)        Subjective     Alaina Galvez is a 28 y.o. female  at 38w3d with Estimated Date of Delivery: 23 who presents with the chief complaint listed above.  She sees Heather Eckert MD for her prenatal care. Her pregnancy has been complicated by:  grand multiparity, previous  x 3, GBS bacteruria, history of syphilis, history of postpartum hemorrhage, iron deficiency anemia, abnormal pap smear.        She describes fetal movement as normal.  She denies rupture of membranes.  She denies vaginal bleeding. She is feeling contractions.          Objective   Patient Active Problem List    Diagnosis     *Maternal care due to low transverse uterine scar from previous  delivery [O34.211]     GBS bacteriuria [R82.71]     H/O syphilis [Z86.19]     History of prior pregnancy with IUGR  [Z87.59]     Nausea and vomiting during pregnancy [O21.9]     37 weeks gestation of pregnancy [Z3A.37]     Current pregnancy with history of pre-term labor in third trimester [O09.213]     LGSIL on Pap smear of cervix [R87.612]     Iron deficiency anemia during pregnancy [O99.019, D50.9]     Iron adverse reaction [T45.4X5A]     History of postpartum hemorrhage, currently pregnant [O09.299]         OB History    Para Term  AB Living   9 7 5 2 1 7   SAB IAB Ectopic Molar Multiple Live Births   1 0 0 0 0 7      # Outcome Date GA Lbr Andrew/2nd Weight Sex Delivery Anes PTL Lv   9 Current            8  22 34w1d  1960 g (4 lb 5.1 oz) F CS-LTranv Spinal N RUTHY      Birth Comments: panda 1      Complications: Fetal Intolerance      Name: ALAINA GALVEZIVONNE      Apgar1: 8  Apgar5: 9   7  Term 20 37w4d  2765 g (6 lb 1.5 oz) M CS-LTranv Spinal N RUTHY      Birth Comments: Panda 1      Name: PIA PEÑA      Apgar1: 8  Apgar5: 8   6  17 34w1d  1990 g (4 lb 6.2 oz) F CS-LTranv EPI Y RUTHY      Birth Comments: BP 4/10       Complications: Abnormal  test      Name: ROSA PEÑA      Apgar1: 4  Apgar5: 8   5 Term 11/08/15 38w2d / 00:04 3626 g (7 lb 15.9 oz) M Vag-Spont EPI N RUTHY      Birth Comments: del note reviewed - JHF       Complications: Postpartum hemorrhage      Apgar1: 8  Apgar5: 9   4 Term 14 40w0d  3555 g (7 lb 13.4 oz) F Vag-Spont None N RUTHY      Birth Comments: del note reviewed - no comps - JF       Apgar1: 8  Apgar5: 9   3 Term 13 40w3d  3720 g (8 lb 3.2 oz) M Vag-Spont EPI N RUTHY      Birth Comments: del note reviewed - no comps       Name: EDEN PEÑA      Apgar1: 8  Apgar5: 9   2 Term 12 40w0d  3742 g (8 lb 4 oz) M Vag-Spont EPI  RUTHY      Birth Comments: del note reviewed - no comps - JHF    1 SAB 09 9w0d             Birth Comments: required D&C for retained placenta      Obstetric Comments   -10/16: CRL c/w 10+6wga, final MARISA 22   -: normal anatomy but several limited views, abnormal placenta   -: normal anatomy, posterior placenta, DVP 3.8cm, EFW 23%ile, AC 8%ile, CL 4.3cm        Past Medical History:   Diagnosis Date    History of anemia     pt states been recieving Fe infusions with current pregnancy    History of transfusion     multple blood transfusions during PREVIOUS pregnancy due to severe anemia. Iron infusions prior to delivery and preventative blood transfusion after deliveries with all pregnancies.    History of urinary tract infection     Migraine     Syphilis     [facility] positive [facility]       Past Surgical History:   Procedure Laterality Date     SECTION  2017     SECTION N/A 3/18/2020    Procedure:  SECTION REPEAT;   Surgeon: Bisi Bermudez MD;  Location: [facility] DELIVERY;  Service: Obstetrics/Gynecology;  Laterality: N/A;     SECTION Bilateral 3/28/2022    Procedure:  SECTION PRIMARY;  Surgeon: Brooks Irwin MD;  Location: [facility] LABOR DELIVERY;  Service: Obstetrics/Gynecology;  Laterality: Bilateral;    DILATATION AND CURETTAGE      Miscarriage       No current facility-administered medications on file prior to encounter.     Current Outpatient Medications on File Prior to Encounter   Medication Sig Dispense Refill    Prenatal Vit-Fe Fumarate-FA (Prenatal Vitamin) 27-0.8 MG tablet Take 1 tablet by mouth Daily. 30 tablet 11    acetaminophen (TYLENOL) 500 MG tablet Take 1 tablet by mouth Every 6 (Six) Hours As Needed for Mild Pain. 60 tablet 1    RSV Pre-Fusion F A&B Vac Rcmb (Abrysvo) 120 MCG/0.5ML reconstituted solution injection Inject  into the appropriate muscle as directed by prescriber. 1 each 0       No Known Allergies    Family History   Problem Relation Age of Onset    Lupus Father     Cancer Mother     Breast cancer Neg Hx     Ovarian cancer Neg Hx     Colon cancer Neg Hx     Endometrial cancer Neg Hx     Uterine cancer Neg Hx        Social History     Socioeconomic History    Marital status:     Number of children: 5   Tobacco Use    Smoking status: Never     Passive exposure: Never    Smokeless tobacco: Never   Vaping Use    Vaping Use: Never used   Substance and Sexual Activity    Alcohol use: Never    Drug use: Yes     Frequency: 1.0 times per week     Types: Marijuana     Comment: Pt states she stopped when she was 3 months pregnant    Sexual activity: Yes     Partners: Male     Birth control/protection: None           Review of Systems   Constitutional:  Negative for chills, fatigue and fever.   HENT:  Negative for congestion, rhinorrhea and sore throat.    Eyes:  Negative for visual disturbance.   Respiratory: Negative.     Cardiovascular: Negative.    Gastrointestinal:   "Negative for abdominal pain, constipation, diarrhea, nausea and vomiting.   Genitourinary:  Negative for difficulty urinating, dyspareunia, dysuria, flank pain, frequency, genital sores, hematuria, pelvic pain, urgency, vaginal bleeding, vaginal discharge and vaginal pain.   Neurological:  Negative for dizziness, seizures, light-headedness and headaches.   Psychiatric/Behavioral:  Negative for sleep disturbance. The patient is not nervous/anxious.         PHYSICAL EXAM:      VITAL SIGNS:  Vitals:    23 2343 23 2357   BP:  100/62   BP Location:  Right arm   Patient Position:  Sitting   Pulse:  93   Resp:  18   Temp:  98 °F (36.7 °C)   TempSrc:  Oral   SpO2:  99%   Weight: 71.5 kg (157 lb 9.6 oz)    Height:  160 cm (63\")        FHT'S:                   Baseline:  150 BPM  Variability:  Moderate = 6 - 25 BPM  Accelerations:  15 x 15 accelerations present     Decelerations:  absent  Contractions:   absent     Interpretation:    Reactive NST, CAT 1 tracing        PHYSICAL EXAM:    General: well developed; well nourished  no acute distress   Heart: Not performed.   Lungs  : breathing is unlabored     Abdomen: soft, non-tender; no masses  no umbilical or inguinal hernias are present  no hepato-splenomegaly       Cervix: was checked (by RN): 1 cm / 40 % / -3  Cervical Dilation (cm): 1  Cervical Effacement: 40%  Fetal Station: -3  Cervical Consistency: medium  Cervical Position: mid-position   Contractions: none        Extremities: peripheral pulses normal, no pedal edema, no clubbing or cyanosis      LABS AND TESTING ORDERED:  Uterine and fetal monitoring      LAB RESULTS:    No results found for this or any previous visit (from the past 24 hour(s)).    Lab Results   Component Value Date    ABO B 2023    RH Positive 2023       Lab Results   Component Value Date    STREPGPB Negative 2020                 Assessment & Plan     ASSESSMENT/PLAN:  Chiquita Galvez is a 28 y.o. female  at 38w3d " "who presented with: complaint of contractions.  Patient not having measurable contractions on tocometer.  Her cervix is unchanged from prior exam.  She was reassured.  Discharged home with return precautions given.          Final Impression:  Pregnancy at 38w3d  Reactive NST.  CAT 1 tracing  Maternal vital signs were reviewed and were unremarkable              Vitals:    12/18/23 2343 12/18/23 2357   BP:  100/62   BP Location:  Right arm   Patient Position:  Sitting   Pulse:  93   Resp:  18   Temp:  98 °F (36.7 °C)   TempSrc:  Oral   SpO2:  99%   Weight: 71.5 kg (157 lb 9.6 oz)    Height:  160 cm (63\")       Lab Results   Component Value Date    STREPGPB Negative 03/11/2020     Lab Results   Component Value Date    ABO B 06/08/2023    RH Positive 06/08/2023     COVID - 19 status unknown    PLAN:       I have spent 30 minutes including face to face time with the patient, greater than 50% in discussion of the diagnosis (counseling) and/or coordination of care.     Lucinda Barros MD  12/19/2023  00:11 EST  OB Hospitalist  Phone:  [phone]  "

## 2023-12-20 ENCOUNTER — ROUTINE PRENATAL (OUTPATIENT)
Dept: OBSTETRICS AND GYNECOLOGY | Age: 28
End: 2023-12-20
Payer: COMMERCIAL

## 2023-12-20 VITALS — WEIGHT: 155.8 LBS | BODY MASS INDEX: 27.6 KG/M2 | SYSTOLIC BLOOD PRESSURE: 102 MMHG | DIASTOLIC BLOOD PRESSURE: 62 MMHG

## 2023-12-20 DIAGNOSIS — Z87.59 HISTORY OF PRIOR PREGNANCY WITH IUGR NEWBORN: ICD-10-CM

## 2023-12-20 DIAGNOSIS — O99.019 IRON DEFICIENCY ANEMIA DURING PREGNANCY: ICD-10-CM

## 2023-12-20 DIAGNOSIS — R87.612 LGSIL ON PAP SMEAR OF CERVIX: ICD-10-CM

## 2023-12-20 DIAGNOSIS — Z86.19 H/O SYPHILIS: ICD-10-CM

## 2023-12-20 DIAGNOSIS — O34.211 MATERNAL CARE DUE TO LOW TRANSVERSE UTERINE SCAR FROM PREVIOUS CESAREAN DELIVERY: ICD-10-CM

## 2023-12-20 DIAGNOSIS — O21.9 NAUSEA AND VOMITING DURING PREGNANCY: ICD-10-CM

## 2023-12-20 DIAGNOSIS — R82.71 GBS BACTERIURIA: ICD-10-CM

## 2023-12-20 DIAGNOSIS — D50.9 IRON DEFICIENCY ANEMIA DURING PREGNANCY: ICD-10-CM

## 2023-12-20 DIAGNOSIS — Z13.89 SCREENING FOR HEMATURIA OR PROTEINURIA: ICD-10-CM

## 2023-12-20 DIAGNOSIS — O09.213 CURRENT PREGNANCY WITH HISTORY OF PRE-TERM LABOR IN THIRD TRIMESTER: ICD-10-CM

## 2023-12-20 DIAGNOSIS — Z3A.38 38 WEEKS GESTATION OF PREGNANCY: Primary | ICD-10-CM

## 2023-12-20 DIAGNOSIS — O09.299 HISTORY OF POSTPARTUM HEMORRHAGE, CURRENTLY PREGNANT: ICD-10-CM

## 2023-12-20 NOTE — PROGRESS NOTES
Chiquita Galvez, a 28 y.o.  at 38w4d, presents for OB follow-up.  She is doing well today.  Denies loss of fluid, vaginal bleeding, and contractions.  Endorses fetal movements.    Objective  BP Readings from Last 3 Encounters:   23 102/62   23 100/62   23 108/60      Wt Readings from Last 3 Encounters:   23 70.7 kg (155 lb 12.8 oz)   23 71.5 kg (157 lb 9.6 oz)   23 71 kg (156 lb 9.6 oz)      Total weight gain this pregnancy: 14 kg (30 lb 12.8 oz)    General: Awake, alert, no apparent distress  Respiratory: No increased work of breathing  Abdomen: Fundus soft and nontender  Extremity: Nontender, no edema    A/P  Diagnoses and all orders for this visit:    1. 38 weeks gestation of pregnancy (Primary)  Overview:  -PNL: Rh pos  -Genetics: cfDNA/carrier/msAFP neg, see cfDNA above, anatomy US normal  -Imm: RI, VI, s/p tdap/flu/RSV  -Contraception: Mirena IUD at time of c/s   -Birthplan: RCS, bottle  -Care coordination: accepts blood transfusions, no latex allergy, call schedule reviewed      Orders:  -     POC Urinalysis Dipstick    2. Screening for hematuria or proteinuria  -     POC Urinalysis Dipstick    3. LGSIL on Pap smear of cervix  Overview:  -20: LSIL pap, did not get planned colpo postpartum  -23: NIL      4. GBS bacteriuria  Overview:  S/p tx with macrobid  Repeat urine cx neg  Planning RCS      5. Maternal care due to low transverse uterine scar from previous  delivery  Overview:  -prior c/s x 3  -first for NRFS at 34wga, op note reviewed by Dr. Mckeon and noted to be low transverse  -second RCS at term for asymmetric IUGR and fetal decels on Us, also low transverse, op note reviewed  -third RCS at 34wga for NRFS, op note reviewed  -plan for RLTCS with Mirena IUD insertion, scheduled 23      6. History of postpartum hemorrhage, currently pregnant  Overview:  -preg in , received 4 units PRBC   -Abs neg  -PPH precautions at  delivery      7. Current pregnancy with history of pre-term labor in third trimester  Overview:  -h/o PTL at 34 weeks, delivered via c/s for non-reassuring fetal status (BPP 4/10)  -subsequent pregnancy and 5 prior all delivered at term  -last delivery at 34wga for NRFS incidentally found in triage, no labor sx      8. History of prior pregnancy with IUGR   Overview:  Plan for serial growth Us > 28wga, normal at 34wga, repeat US at next visit      9. Nausea and vomiting during pregnancy  Overview:  -Rx zofran sent      10. Iron deficiency anemia during pregnancy  Overview:  -H/o severe anemia requiring blood transfusions in prior preg  -Hb electro normal  -S/p IV iron x 3, was planning for 5 total but missed last 2 doses  -Hb improved to 11.5, no further IV iron needed      11. H/O syphilis  Overview:  -H/o Syphilis many yrs ago, s/p tx  -Ab titer stable at 1:1          Labor precautions reviewed  Return in about 2 weeks (around 1/3/2024) for incision check.    Heather Eckert MD

## 2023-12-23 ENCOUNTER — ANESTHESIA (OUTPATIENT)
Dept: LABOR AND DELIVERY | Facility: HOSPITAL | Age: 28
End: 2023-12-23
Payer: COMMERCIAL

## 2023-12-23 ENCOUNTER — HOSPITAL ENCOUNTER (INPATIENT)
Facility: HOSPITAL | Age: 28
LOS: 2 days | Discharge: HOME | End: 2023-12-25
Attending: STUDENT IN AN ORGANIZED HEALTH CARE EDUCATION/TRAINING PROGRAM | Admitting: STUDENT IN AN ORGANIZED HEALTH CARE EDUCATION/TRAINING PROGRAM
Payer: COMMERCIAL

## 2023-12-23 DIAGNOSIS — O34.219 H/O CESAREAN SECTION COMPLICATING PREGNANCY: Primary | ICD-10-CM

## 2023-12-23 DIAGNOSIS — O34.211 MATERNAL CARE DUE TO LOW TRANSVERSE UTERINE SCAR FROM PREVIOUS CESAREAN DELIVERY: ICD-10-CM

## 2023-12-23 PROBLEM — Z3A.39 39 WEEKS GESTATION OF PREGNANCY: Status: ACTIVE | Noted: 2021-11-11

## 2023-12-23 LAB
ABO GROUP BLD: NORMAL
AMPHET+METHAMPHET UR QL: NEGATIVE
AMPHET+METHAMPHET UR QL: POSITIVE
ATMOSPHERIC PRESS: 753.7 MMHG
ATMOSPHERIC PRESS: 755.1 MMHG
BARBITURATES UR QL SCN: NEGATIVE
BARBITURATES UR QL SCN: NEGATIVE
BASE EXCESS BLDCOA CALC-SCNC: -2.9 MMOL/L (ref -2–2)
BASE EXCESS BLDCOV CALC-SCNC: -3.8 MMOL/L (ref -30–30)
BDY SITE: ABNORMAL
BDY SITE: NORMAL
BENZODIAZ UR QL SCN: NEGATIVE
BENZODIAZ UR QL SCN: NEGATIVE
BLD GP AB SCN SERPL QL: NEGATIVE
CANNABINOIDS SERPL QL: NEGATIVE
CANNABINOIDS SERPL QL: NEGATIVE
CO2 BLDA-SCNC: 23.2 MMOL/L (ref 23–27)
CO2 BLDA-SCNC: 25.4 MMOL/L (ref 23–27)
COCAINE UR QL: NEGATIVE
COCAINE UR QL: POSITIVE
DEPRECATED RDW RBC AUTO: 62 FL (ref 37–54)
DEVICE COMMENT: ABNORMAL
DEVICE COMMENT: NORMAL
ERYTHROCYTE [DISTWIDTH] IN BLOOD BY AUTOMATED COUNT: 23.6 % (ref 12.3–15.4)
FENTANYL UR-MCNC: POSITIVE NG/ML
FENTANYL UR-MCNC: POSITIVE NG/ML
GAS FLOW AIRWAY: 2 LPM
GAS FLOW AIRWAY: 2 LPM
HCO3 BLDCOA-SCNC: 23.9 MMOL/L (ref 22–28)
HCO3 BLDCOV-SCNC: 21.9 MMOL/L
HCT VFR BLD AUTO: 35.1 % (ref 34–46.6)
HGB BLD-MCNC: 11.1 G/DL (ref 12–15.9)
MCH RBC QN AUTO: 24.7 PG (ref 26.6–33)
MCHC RBC AUTO-ENTMCNC: 31.6 G/DL (ref 31.5–35.7)
MCV RBC AUTO: 78.2 FL (ref 79–97)
METHADONE UR QL SCN: NEGATIVE
METHADONE UR QL SCN: NEGATIVE
MODALITY: ABNORMAL
MODALITY: NORMAL
OPIATES UR QL: NEGATIVE
OPIATES UR QL: POSITIVE
OXYCODONE UR QL SCN: NEGATIVE
OXYCODONE UR QL SCN: NEGATIVE
PCO2 BLDCOA: 49.1 MMHG (ref 43–63)
PCO2 BLDCOV: 41.2 MM HG (ref 35–51.3)
PH BLDCOA: 7.3 PH UNITS (ref 7.18–7.34)
PH BLDCOV: 7.33 PH UNITS (ref 7.26–7.4)
PLATELET # BLD AUTO: 246 10*3/MM3 (ref 140–450)
PMV BLD AUTO: 10.1 FL (ref 6–12)
PO2 BLDCOA: <18.1 MMHG (ref 12–26)
PO2 BLDCOV: 34.2 MM HG (ref 19–39)
RBC # BLD AUTO: 4.49 10*6/MM3 (ref 3.77–5.28)
RH BLD: POSITIVE
SAO2 % BLDCOA: 18.2 %
SAO2 % BLDCOV: NORMAL %
T&S EXPIRATION DATE: NORMAL
WBC NRBC COR # BLD AUTO: 9.41 10*3/MM3 (ref 3.4–10.8)

## 2023-12-23 PROCEDURE — 25010000002 BUPIVACAINE PF 0.75 % SOLUTION: Performed by: ANESTHESIOLOGY

## 2023-12-23 PROCEDURE — 80307 DRUG TEST PRSMV CHEM ANLYZR: CPT | Performed by: STUDENT IN AN ORGANIZED HEALTH CARE EDUCATION/TRAINING PROGRAM

## 2023-12-23 PROCEDURE — 25010000002 PHENYLEPHRINE 10 MG/ML SOLUTION: Performed by: ANESTHESIOLOGY

## 2023-12-23 PROCEDURE — 25010000002 METHYLERGONOVINE MALEATE PER 0.2 MG: Performed by: STUDENT IN AN ORGANIZED HEALTH CARE EDUCATION/TRAINING PROGRAM

## 2023-12-23 PROCEDURE — 59515 CESAREAN DELIVERY: CPT | Performed by: STUDENT IN AN ORGANIZED HEALTH CARE EDUCATION/TRAINING PROGRAM

## 2023-12-23 PROCEDURE — 80307 DRUG TEST PRSMV CHEM ANLYZR: CPT | Performed by: OBSTETRICS & GYNECOLOGY

## 2023-12-23 PROCEDURE — 25010000002 HYDROMORPHONE PER 4 MG: Performed by: ANESTHESIOLOGY

## 2023-12-23 PROCEDURE — 25010000002 KETOROLAC TROMETHAMINE PER 15 MG: Performed by: STUDENT IN AN ORGANIZED HEALTH CARE EDUCATION/TRAINING PROGRAM

## 2023-12-23 PROCEDURE — 86850 RBC ANTIBODY SCREEN: CPT | Performed by: STUDENT IN AN ORGANIZED HEALTH CARE EDUCATION/TRAINING PROGRAM

## 2023-12-23 PROCEDURE — S0260 H&P FOR SURGERY: HCPCS | Performed by: STUDENT IN AN ORGANIZED HEALTH CARE EDUCATION/TRAINING PROGRAM

## 2023-12-23 PROCEDURE — 25010000002 CEFAZOLIN IN DEXTROSE 2-4 GM/100ML-% SOLUTION: Performed by: STUDENT IN AN ORGANIZED HEALTH CARE EDUCATION/TRAINING PROGRAM

## 2023-12-23 PROCEDURE — 25010000002 ONDANSETRON PER 1 MG: Performed by: STUDENT IN AN ORGANIZED HEALTH CARE EDUCATION/TRAINING PROGRAM

## 2023-12-23 PROCEDURE — 25010000002 MORPHINE PER 10 MG: Performed by: ANESTHESIOLOGY

## 2023-12-23 PROCEDURE — G0480 DRUG TEST DEF 1-7 CLASSES: HCPCS | Performed by: OBSTETRICS & GYNECOLOGY

## 2023-12-23 PROCEDURE — 86901 BLOOD TYPING SEROLOGIC RH(D): CPT | Performed by: STUDENT IN AN ORGANIZED HEALTH CARE EDUCATION/TRAINING PROGRAM

## 2023-12-23 PROCEDURE — 85027 COMPLETE CBC AUTOMATED: CPT | Performed by: STUDENT IN AN ORGANIZED HEALTH CARE EDUCATION/TRAINING PROGRAM

## 2023-12-23 PROCEDURE — 86923 COMPATIBILITY TEST ELECTRIC: CPT

## 2023-12-23 PROCEDURE — 86900 BLOOD TYPING SEROLOGIC ABO: CPT | Performed by: STUDENT IN AN ORGANIZED HEALTH CARE EDUCATION/TRAINING PROGRAM

## 2023-12-23 PROCEDURE — 82803 BLOOD GASES ANY COMBINATION: CPT

## 2023-12-23 PROCEDURE — 88307 TISSUE EXAM BY PATHOLOGIST: CPT

## 2023-12-23 PROCEDURE — 0UH90HZ INSERTION OF CONTRACEPTIVE DEVICE INTO UTERUS, OPEN APPROACH: ICD-10-PCS | Performed by: STUDENT IN AN ORGANIZED HEALTH CARE EDUCATION/TRAINING PROGRAM

## 2023-12-23 PROCEDURE — C1889 IMPLANT/INSERT DEVICE, NOC: HCPCS | Performed by: STUDENT IN AN ORGANIZED HEALTH CARE EDUCATION/TRAINING PROGRAM

## 2023-12-23 PROCEDURE — 58300 INSERT INTRAUTERINE DEVICE: CPT | Performed by: STUDENT IN AN ORGANIZED HEALTH CARE EDUCATION/TRAINING PROGRAM

## 2023-12-23 PROCEDURE — 25810000003 LACTATED RINGERS PER 1000 ML: Performed by: ANESTHESIOLOGY

## 2023-12-23 PROCEDURE — G0480 DRUG TEST DEF 1-7 CLASSES: HCPCS | Performed by: STUDENT IN AN ORGANIZED HEALTH CARE EDUCATION/TRAINING PROGRAM

## 2023-12-23 PROCEDURE — 25010000002 CLONIDINE PER 1 MG: Performed by: STUDENT IN AN ORGANIZED HEALTH CARE EDUCATION/TRAINING PROGRAM

## 2023-12-23 PROCEDURE — 25010000002 EPINEPHRINE 1 MG/ML SOLUTION 30 ML VIAL: Performed by: STUDENT IN AN ORGANIZED HEALTH CARE EDUCATION/TRAINING PROGRAM

## 2023-12-23 PROCEDURE — 25810000003 LACTATED RINGERS PER 1000 ML: Performed by: STUDENT IN AN ORGANIZED HEALTH CARE EDUCATION/TRAINING PROGRAM

## 2023-12-23 PROCEDURE — 25010000002 ROPIVACAINE PER 1 MG: Performed by: STUDENT IN AN ORGANIZED HEALTH CARE EDUCATION/TRAINING PROGRAM

## 2023-12-23 DEVICE — DEV CONTRL TISS STRATAFIXSPIRALMNCRYL PLSPS2 REV4/0 30CM: Type: IMPLANTABLE DEVICE | Site: ABDOMEN | Status: FUNCTIONAL

## 2023-12-23 DEVICE — IUD LEVONORGESTREL MIRENA 52MG: Type: IMPLANTABLE DEVICE | Site: UTERUS | Status: FUNCTIONAL

## 2023-12-23 RX ORDER — DIPHENHYDRAMINE HCL 25 MG
25 CAPSULE ORAL EVERY 4 HOURS PRN
Status: DISCONTINUED | OUTPATIENT
Start: 2023-12-23 | End: 2023-12-25 | Stop reason: HOSPADM

## 2023-12-23 RX ORDER — ACETAMINOPHEN 325 MG/1
650 TABLET ORAL ONCE
Status: COMPLETED | OUTPATIENT
Start: 2023-12-23 | End: 2023-12-24

## 2023-12-23 RX ORDER — DIPHENHYDRAMINE HYDROCHLORIDE 50 MG/ML
25 INJECTION INTRAMUSCULAR; INTRAVENOUS EVERY 4 HOURS PRN
Status: DISCONTINUED | OUTPATIENT
Start: 2023-12-23 | End: 2023-12-25 | Stop reason: HOSPADM

## 2023-12-23 RX ORDER — NALOXONE HCL 0.4 MG/ML
0.2 VIAL (ML) INJECTION
Status: CANCELLED | OUTPATIENT
Start: 2023-12-23

## 2023-12-23 RX ORDER — CEFAZOLIN SODIUM 2 G/100ML
2 INJECTION, SOLUTION INTRAVENOUS ONCE
Status: COMPLETED | OUTPATIENT
Start: 2023-12-23 | End: 2023-12-23

## 2023-12-23 RX ORDER — DIPHENHYDRAMINE HCL 25 MG
50 CAPSULE ORAL ONCE
Status: COMPLETED | OUTPATIENT
Start: 2023-12-23 | End: 2023-12-24

## 2023-12-23 RX ORDER — SODIUM CHLORIDE 0.9 % (FLUSH) 0.9 %
10 SYRINGE (ML) INJECTION EVERY 12 HOURS SCHEDULED
Status: DISCONTINUED | OUTPATIENT
Start: 2023-12-23 | End: 2023-12-23

## 2023-12-23 RX ORDER — OXYTOCIN/0.9 % SODIUM CHLORIDE 30/500 ML
250 PLASTIC BAG, INJECTION (ML) INTRAVENOUS CONTINUOUS
Status: ACTIVE | OUTPATIENT
Start: 2023-12-23 | End: 2023-12-23

## 2023-12-23 RX ORDER — DROPERIDOL 2.5 MG/ML
0.62 INJECTION, SOLUTION INTRAMUSCULAR; INTRAVENOUS
Status: DISCONTINUED | OUTPATIENT
Start: 2023-12-23 | End: 2023-12-25 | Stop reason: HOSPADM

## 2023-12-23 RX ORDER — SODIUM CHLORIDE, SODIUM LACTATE, POTASSIUM CHLORIDE, CALCIUM CHLORIDE 600; 310; 30; 20 MG/100ML; MG/100ML; MG/100ML; MG/100ML
125 INJECTION, SOLUTION INTRAVENOUS CONTINUOUS
Status: DISCONTINUED | OUTPATIENT
Start: 2023-12-23 | End: 2023-12-23

## 2023-12-23 RX ORDER — MISOPROSTOL 200 UG/1
800 TABLET ORAL AS NEEDED
Status: DISCONTINUED | OUTPATIENT
Start: 2023-12-23 | End: 2023-12-23

## 2023-12-23 RX ORDER — METHYLERGONOVINE MALEATE 0.2 MG/ML
200 INJECTION INTRAVENOUS AS NEEDED
Status: DISCONTINUED | OUTPATIENT
Start: 2023-12-23 | End: 2023-12-23

## 2023-12-23 RX ORDER — LIDOCAINE HYDROCHLORIDE 10 MG/ML
0.5 INJECTION, SOLUTION INFILTRATION; PERINEURAL ONCE AS NEEDED
Status: DISCONTINUED | OUTPATIENT
Start: 2023-12-23 | End: 2023-12-23

## 2023-12-23 RX ORDER — OXYTOCIN/0.9 % SODIUM CHLORIDE 30/500 ML
999 PLASTIC BAG, INJECTION (ML) INTRAVENOUS ONCE
Status: COMPLETED | OUTPATIENT
Start: 2023-12-23 | End: 2023-12-23

## 2023-12-23 RX ORDER — KETOROLAC TROMETHAMINE 30 MG/ML
30 INJECTION, SOLUTION INTRAMUSCULAR; INTRAVENOUS ONCE
Status: COMPLETED | OUTPATIENT
Start: 2023-12-23 | End: 2023-12-23

## 2023-12-23 RX ORDER — PHYTONADIONE 1 MG/.5ML
INJECTION, EMULSION INTRAMUSCULAR; INTRAVENOUS; SUBCUTANEOUS
Status: ACTIVE
Start: 2023-12-23 | End: 2023-12-23

## 2023-12-23 RX ORDER — SODIUM CHLORIDE, SODIUM LACTATE, POTASSIUM CHLORIDE, CALCIUM CHLORIDE 600; 310; 30; 20 MG/100ML; MG/100ML; MG/100ML; MG/100ML
9 INJECTION, SOLUTION INTRAVENOUS CONTINUOUS
Status: CANCELLED | OUTPATIENT
Start: 2023-12-23

## 2023-12-23 RX ORDER — ACETAMINOPHEN 500 MG
1000 TABLET ORAL EVERY 6 HOURS
Status: COMPLETED | OUTPATIENT
Start: 2023-12-23 | End: 2023-12-24

## 2023-12-23 RX ORDER — ONDANSETRON 4 MG/1
4 TABLET, FILM COATED ORAL EVERY 4 HOURS PRN
Status: DISCONTINUED | OUTPATIENT
Start: 2023-12-23 | End: 2023-12-25 | Stop reason: HOSPADM

## 2023-12-23 RX ORDER — ONDANSETRON 2 MG/ML
4 INJECTION INTRAMUSCULAR; INTRAVENOUS ONCE AS NEEDED
Status: DISCONTINUED | OUTPATIENT
Start: 2023-12-23 | End: 2023-12-25 | Stop reason: HOSPADM

## 2023-12-23 RX ORDER — HYDROXYZINE 50 MG/1
50 TABLET, FILM COATED ORAL EVERY 6 HOURS PRN
Status: DISCONTINUED | OUTPATIENT
Start: 2023-12-23 | End: 2023-12-25 | Stop reason: HOSPADM

## 2023-12-23 RX ORDER — ONDANSETRON 4 MG/1
4 TABLET, FILM COATED ORAL EVERY 6 HOURS PRN
Status: DISCONTINUED | OUTPATIENT
Start: 2023-12-23 | End: 2023-12-23

## 2023-12-23 RX ORDER — IBUPROFEN 600 MG/1
600 TABLET ORAL EVERY 6 HOURS
Status: DISCONTINUED | OUTPATIENT
Start: 2023-12-24 | End: 2023-12-25 | Stop reason: HOSPADM

## 2023-12-23 RX ORDER — OXYCODONE HYDROCHLORIDE 10 MG/1
10 TABLET ORAL EVERY 4 HOURS PRN
Status: DISCONTINUED | OUTPATIENT
Start: 2023-12-23 | End: 2023-12-25 | Stop reason: HOSPADM

## 2023-12-23 RX ORDER — ACETAMINOPHEN 325 MG/1
650 TABLET ORAL EVERY 6 HOURS
Status: DISCONTINUED | OUTPATIENT
Start: 2023-12-24 | End: 2023-12-25 | Stop reason: HOSPADM

## 2023-12-23 RX ORDER — SODIUM CHLORIDE 0.9 % (FLUSH) 0.9 %
10 SYRINGE (ML) INJECTION AS NEEDED
Status: DISCONTINUED | OUTPATIENT
Start: 2023-12-23 | End: 2023-12-23

## 2023-12-23 RX ORDER — TRANEXAMIC ACID 10 MG/ML
INJECTION, SOLUTION INTRAVENOUS AS NEEDED
Status: DISCONTINUED | OUTPATIENT
Start: 2023-12-23 | End: 2023-12-23 | Stop reason: SURG

## 2023-12-23 RX ORDER — ONDANSETRON 2 MG/ML
4 INJECTION INTRAMUSCULAR; INTRAVENOUS EVERY 6 HOURS PRN
Status: DISCONTINUED | OUTPATIENT
Start: 2023-12-23 | End: 2023-12-23

## 2023-12-23 RX ORDER — HYDROMORPHONE HYDROCHLORIDE 1 MG/ML
0.5 INJECTION, SOLUTION INTRAMUSCULAR; INTRAVENOUS; SUBCUTANEOUS
Status: DISCONTINUED | OUTPATIENT
Start: 2023-12-23 | End: 2023-12-23 | Stop reason: HOSPADM

## 2023-12-23 RX ORDER — FAMOTIDINE 10 MG/ML
20 INJECTION, SOLUTION INTRAVENOUS ONCE
Status: COMPLETED | OUTPATIENT
Start: 2023-12-23 | End: 2023-12-23

## 2023-12-23 RX ORDER — MIDAZOLAM HYDROCHLORIDE 1 MG/ML
1 INJECTION INTRAMUSCULAR; INTRAVENOUS
Status: CANCELLED | OUTPATIENT
Start: 2023-12-23

## 2023-12-23 RX ORDER — BUPIVACAINE HYDROCHLORIDE 7.5 MG/ML
INJECTION, SOLUTION EPIDURAL; RETROBULBAR
Status: COMPLETED | OUTPATIENT
Start: 2023-12-23 | End: 2023-12-23

## 2023-12-23 RX ORDER — MORPHINE SULFATE 4 MG/ML
INJECTION, SOLUTION INTRAMUSCULAR; INTRAVENOUS
Status: COMPLETED | OUTPATIENT
Start: 2023-12-23 | End: 2023-12-23

## 2023-12-23 RX ORDER — ERYTHROMYCIN 5 MG/G
OINTMENT OPHTHALMIC
Status: ACTIVE
Start: 2023-12-23 | End: 2023-12-23

## 2023-12-23 RX ORDER — SODIUM CHLORIDE 0.9 % (FLUSH) 0.9 %
3-10 SYRINGE (ML) INJECTION AS NEEDED
Status: CANCELLED | OUTPATIENT
Start: 2023-12-23

## 2023-12-23 RX ORDER — FENTANYL CITRATE 50 UG/ML
50 INJECTION, SOLUTION INTRAMUSCULAR; INTRAVENOUS ONCE AS NEEDED
Status: CANCELLED | OUTPATIENT
Start: 2023-12-23

## 2023-12-23 RX ORDER — OXYCODONE HYDROCHLORIDE 5 MG/1
5 TABLET ORAL EVERY 4 HOURS PRN
Status: DISCONTINUED | OUTPATIENT
Start: 2023-12-23 | End: 2023-12-25 | Stop reason: HOSPADM

## 2023-12-23 RX ORDER — KETOROLAC TROMETHAMINE 15 MG/ML
15 INJECTION, SOLUTION INTRAMUSCULAR; INTRAVENOUS EVERY 6 HOURS
Status: COMPLETED | OUTPATIENT
Start: 2023-12-23 | End: 2023-12-24

## 2023-12-23 RX ORDER — CARBOPROST TROMETHAMINE 250 UG/ML
250 INJECTION, SOLUTION INTRAMUSCULAR AS NEEDED
Status: DISCONTINUED | OUTPATIENT
Start: 2023-12-23 | End: 2023-12-23

## 2023-12-23 RX ORDER — ACETAMINOPHEN 500 MG
1000 TABLET ORAL ONCE
Status: COMPLETED | OUTPATIENT
Start: 2023-12-23 | End: 2023-12-23

## 2023-12-23 RX ORDER — PHENYLEPHRINE HYDROCHLORIDE 10 MG/ML
INJECTION INTRAVENOUS AS NEEDED
Status: DISCONTINUED | OUTPATIENT
Start: 2023-12-23 | End: 2023-12-23 | Stop reason: SURG

## 2023-12-23 RX ORDER — SODIUM CHLORIDE 9 MG/ML
40 INJECTION, SOLUTION INTRAVENOUS AS NEEDED
Status: DISCONTINUED | OUTPATIENT
Start: 2023-12-23 | End: 2023-12-23

## 2023-12-23 RX ORDER — LIDOCAINE HYDROCHLORIDE 10 MG/ML
0.5 INJECTION, SOLUTION INFILTRATION; PERINEURAL ONCE AS NEEDED
Status: CANCELLED | OUTPATIENT
Start: 2023-12-23

## 2023-12-23 RX ORDER — OXYTOCIN/0.9 % SODIUM CHLORIDE 30/500 ML
125 PLASTIC BAG, INJECTION (ML) INTRAVENOUS CONTINUOUS PRN
Status: COMPLETED | OUTPATIENT
Start: 2023-12-23 | End: 2023-12-23

## 2023-12-23 RX ORDER — SODIUM CHLORIDE 0.9 % (FLUSH) 0.9 %
3 SYRINGE (ML) INJECTION EVERY 12 HOURS SCHEDULED
Status: CANCELLED | OUTPATIENT
Start: 2023-12-23

## 2023-12-23 RX ORDER — SODIUM CHLORIDE, SODIUM LACTATE, POTASSIUM CHLORIDE, CALCIUM CHLORIDE 600; 310; 30; 20 MG/100ML; MG/100ML; MG/100ML; MG/100ML
INJECTION, SOLUTION INTRAVENOUS CONTINUOUS PRN
Status: DISCONTINUED | OUTPATIENT
Start: 2023-12-23 | End: 2023-12-23 | Stop reason: SURG

## 2023-12-23 RX ORDER — MORPHINE SULFATE 2 MG/ML
2 INJECTION, SOLUTION INTRAMUSCULAR; INTRAVENOUS
Status: ACTIVE | OUTPATIENT
Start: 2023-12-23 | End: 2023-12-23

## 2023-12-23 RX ORDER — AMOXICILLIN 250 MG
1 CAPSULE ORAL 2 TIMES DAILY
Status: DISCONTINUED | OUTPATIENT
Start: 2023-12-23 | End: 2023-12-25 | Stop reason: HOSPADM

## 2023-12-23 RX ADMIN — PHENYLEPHRINE HYDROCHLORIDE 100 MCG: 10 INJECTION INTRAVENOUS at 08:26

## 2023-12-23 RX ADMIN — PHENYLEPHRINE HYDROCHLORIDE 200 MCG: 10 INJECTION INTRAVENOUS at 07:47

## 2023-12-23 RX ADMIN — OXYCODONE HYDROCHLORIDE 10 MG: 10 TABLET ORAL at 11:39

## 2023-12-23 RX ADMIN — ACETAMINOPHEN 1000 MG: 500 TABLET ORAL at 11:38

## 2023-12-23 RX ADMIN — ACETAMINOPHEN 1000 MG: 500 TABLET ORAL at 23:44

## 2023-12-23 RX ADMIN — CLONIDINE HYDROCHLORIDE 100 ML: 0.1 INJECTION, SOLUTION EPIDURAL at 08:17

## 2023-12-23 RX ADMIN — SODIUM CHLORIDE, POTASSIUM CHLORIDE, SODIUM LACTATE AND CALCIUM CHLORIDE 125 ML/HR: 600; 310; 30; 20 INJECTION, SOLUTION INTRAVENOUS at 07:20

## 2023-12-23 RX ADMIN — DOCUSATE SODIUM 50MG AND SENNOSIDES 8.6MG 1 TABLET: 8.6; 5 TABLET, FILM COATED ORAL at 21:14

## 2023-12-23 RX ADMIN — METHYLERGONOVINE MALEATE 200 MCG: 0.2 INJECTION INTRAVENOUS at 08:38

## 2023-12-23 RX ADMIN — MORPHINE SULFATE 200 MCG: 4 INJECTION, SOLUTION INTRAMUSCULAR; INTRAVENOUS at 07:44

## 2023-12-23 RX ADMIN — PHENYLEPHRINE HYDROCHLORIDE 100 MCG: 10 INJECTION INTRAVENOUS at 08:02

## 2023-12-23 RX ADMIN — PHENYLEPHRINE HYDROCHLORIDE 100 MCG: 10 INJECTION INTRAVENOUS at 08:17

## 2023-12-23 RX ADMIN — Medication 999 ML/HR: at 07:59

## 2023-12-23 RX ADMIN — PHENYLEPHRINE HYDROCHLORIDE 100 MCG: 10 INJECTION INTRAVENOUS at 08:12

## 2023-12-23 RX ADMIN — OXYCODONE HYDROCHLORIDE 10 MG: 10 TABLET ORAL at 23:44

## 2023-12-23 RX ADMIN — ACETAMINOPHEN 1000 MG: 500 TABLET ORAL at 06:34

## 2023-12-23 RX ADMIN — PHENYLEPHRINE HYDROCHLORIDE 100 MCG: 10 INJECTION INTRAVENOUS at 08:09

## 2023-12-23 RX ADMIN — SODIUM CHLORIDE, POTASSIUM CHLORIDE, SODIUM LACTATE AND CALCIUM CHLORIDE 125 ML/HR: 600; 310; 30; 20 INJECTION, SOLUTION INTRAVENOUS at 06:02

## 2023-12-23 RX ADMIN — SODIUM CHLORIDE, POTASSIUM CHLORIDE, SODIUM LACTATE AND CALCIUM CHLORIDE: 600; 310; 30; 20 INJECTION, SOLUTION INTRAVENOUS at 08:33

## 2023-12-23 RX ADMIN — Medication 125 ML/HR: at 09:38

## 2023-12-23 RX ADMIN — SODIUM CHLORIDE, POTASSIUM CHLORIDE, SODIUM LACTATE AND CALCIUM CHLORIDE: 600; 310; 30; 20 INJECTION, SOLUTION INTRAVENOUS at 07:45

## 2023-12-23 RX ADMIN — PHENYLEPHRINE HYDROCHLORIDE 100 MCG: 10 INJECTION INTRAVENOUS at 08:07

## 2023-12-23 RX ADMIN — KETOROLAC TROMETHAMINE 15 MG: 15 INJECTION, SOLUTION INTRAMUSCULAR; INTRAVENOUS at 15:38

## 2023-12-23 RX ADMIN — TRANEXAMIC ACID 1000 MG: 10 INJECTION, SOLUTION INTRAVENOUS at 07:59

## 2023-12-23 RX ADMIN — PHENYLEPHRINE HYDROCHLORIDE 100 MCG: 10 INJECTION INTRAVENOUS at 07:44

## 2023-12-23 RX ADMIN — CEFAZOLIN SODIUM 2 G: 2 INJECTION, SOLUTION INTRAVENOUS at 07:25

## 2023-12-23 RX ADMIN — FAMOTIDINE 20 MG: 10 INJECTION INTRAVENOUS at 07:22

## 2023-12-23 RX ADMIN — PHENYLEPHRINE HYDROCHLORIDE 100 MCG: 10 INJECTION INTRAVENOUS at 08:15

## 2023-12-23 RX ADMIN — KETOROLAC TROMETHAMINE 15 MG: 15 INJECTION, SOLUTION INTRAMUSCULAR; INTRAVENOUS at 21:14

## 2023-12-23 RX ADMIN — HYDROMORPHONE HYDROCHLORIDE 0.5 MG: 1 INJECTION, SOLUTION INTRAMUSCULAR; INTRAVENOUS; SUBCUTANEOUS at 10:04

## 2023-12-23 RX ADMIN — ONDANSETRON 4 MG: 2 INJECTION INTRAMUSCULAR; INTRAVENOUS at 07:22

## 2023-12-23 RX ADMIN — KETOROLAC TROMETHAMINE 30 MG: 30 INJECTION INTRAMUSCULAR; INTRAVENOUS at 09:55

## 2023-12-23 RX ADMIN — PHENYLEPHRINE HYDROCHLORIDE 100 MCG: 10 INJECTION INTRAVENOUS at 08:19

## 2023-12-23 RX ADMIN — PHENYLEPHRINE HYDROCHLORIDE 100 MCG: 10 INJECTION INTRAVENOUS at 07:50

## 2023-12-23 RX ADMIN — OXYCODONE HYDROCHLORIDE 10 MG: 10 TABLET ORAL at 18:19

## 2023-12-23 RX ADMIN — PHENYLEPHRINE HYDROCHLORIDE 100 MCG: 10 INJECTION INTRAVENOUS at 07:38

## 2023-12-23 RX ADMIN — HYDROMORPHONE HYDROCHLORIDE 0.5 MG: 1 INJECTION, SOLUTION INTRAMUSCULAR; INTRAVENOUS; SUBCUTANEOUS at 10:32

## 2023-12-23 RX ADMIN — ACETAMINOPHEN 1000 MG: 500 TABLET ORAL at 18:19

## 2023-12-23 RX ADMIN — BUPIVACAINE HYDROCHLORIDE 1.4 ML: 7.5 INJECTION, SOLUTION EPIDURAL; RETROBULBAR at 07:44

## 2023-12-23 NOTE — PROGRESS NOTES
Discharge Planning Assessment  [facility]     Patient Name: Chiquita Galvez  MRN: [id number]  Today's Date: 12/23/2023    Admit Date: 12/23/2023    Plan: Infant may discharge to mother when medically ready, unless CPS states otherwise. CSW will follow cord tox. MARI Truong   Discharge Needs Assessment    No documentation.                  Discharge Plan       Row Name 12/23/23 1649       Plan    Plan Infant may discharge to mother when medically ready, unless CPS states otherwise. CSW will follow cord tox. MARI Truong    Plan Comments (continued) CSW discussed mother's UDS results and mandated reporting to CPS. Mother insisted she has not knowingly used any medication or substances that contained fentanyl. Mother reports maternal grandmother of infant gave her a pill, allegedly Ibuprofen, for a headache 2-3 days ago. Mother was unsure if this was a prescription or OTC medication. Mother asked appropriate questions regarding the CPS process. CSW spent time building rapport with mother, and offered validation, support, and encouragement to her throughout the assessment. CSW provided a packet of resources to mother including: WIC, HANDS, transportation, infant supplies, counseling, online support groups, postpartum mood and anxiety resources, and general community resources. Mother was polite and cooperative, and denied having unmet needs at this time. CSW submitted a CPS report via hotline for the second UDS, and included that mother was given a pill from [employer] 2-3 days ago; web ID# [id number]. CPS  shared she would be contacting the on-call CPS worker immediately to see if the report meets criteria for investigation. Due to mother's history and no other indications of fentanyl use in her prenatal record, infant may discharge to mother when medically ready unless CPS states otherwise and CPS may follow up at home. CSW will follow infant's cord toxicology, and report again to CPS if warranted. Mallorie  "MARI LAURENT      Row Name 12/23/23 5777       Plan    Plan Comments Mother: Chiquita Galvez, MRN [id number]; Infant: Sarah \"Chester\" Lenny, MRN [id number]. CSW was consulted for \"Hx. of THC use\". Of note, mother's UDS was positive for THC prenatally on 6/8 and positive on admission for cocaine and fentanyl 12/23. Mother voiced that she was not provided a urine cup on admit, and used the potty hat in the toilet to collect urine. Mother reports FOB and a visitor also urinated in the potty hat prior to removal, and was concerned the initial UDS was incorrect. Mother requested a second UDS be collected via her catheter and this UDS was positive for fentanyl, amphetamines, and opiates. Mother received Morphine and Dilaudid during labor (accounts for the Opiates) and Phenylephrine via IV (known to cause false positives for amphetamines), however she did not receive fentanyl during labor. Infant's UDS was negative and cord toxicology has been sent. CSW met with mother alone at bedside. Mother verified her address, phone number and insurance. A [employer] rep has not met with mother to have infant added to Medicaid yet, but CSW informed mother someone should be able to assist her prior to discharge. Mother reports having a car seat in room, crib/bassinet, clothes, and diapers for infant. This is mother has 7 other children, and this is her third child with FOB/spouse. Mother's other children are ages 11, 10, 9, 8, 6, 3, 1, and they are being cared for by maternal aunt of infant and other family members during mother's hospital stay. Mother's support system includes FOB/spouse, maternal grandmother of infant, maternal aunt of infant, friends, and family. Infant will follow up with [facility], mother is comfortable scheduling appointments for infant, and has transportation via [employer] and assistance via [facility]. Mother is not current with [facility], but shared [facility] is " assisting her with that. Mother denied feeling unsafe, threatened, or isolated from others at this time. (continued)                  Continued Care and Services - Admitted Since 12/23/2023    Coordination has not been started for this encounter.       Selected Continued Care - Episodes Includes continued care and service providers with selected services from the active episodes listed below      [facility] Episode start date: 7/25/2023   There are no active outsourced providers for this episode.                 Expected Discharge Date and Time       Expected Discharge Date Expected Discharge Time    Dec 24, 2023            Demographic Summary       Row Name 12/23/23 1645       General Information    Admission Type inpatient    Arrived From home    Referral Source nursing    Reason for Consult psychosocial concerns;substance use concerns;community resources    General Information Comments mom UDS pos fentanyl, opiates, and amphetamines on admit, hx of THC 1st trimester, resources/support                   Functional Status       Row Name 12/23/23 1646       Mental Status    General Appearance WDL WDL       Mental Status Summary    Recent Changes in Mental Status/Cognitive Functioning no changes                   Psychosocial       Row Name 12/23/23 1646       Behavior WDL    Behavior WDL WDL       Emotion Mood WDL    Emotion/Mood/Affect WDL WDL       Speech WDL    Speech WDL WDL       Perceptual State WDL    Perceptual State WDL WDL       Thought Process WDL    Thought Process WDL WDL       Intellectual Performance WDL    Intellectual Performance WDL WDL       Coping/Stress    Major Change/Loss/Stressor birth    Patient Personal Strengths expressive of emotions;flexibility;future/goal oriented;positive attitude;strong support system    Sources of Support friend(s);other family members;parent(s);sibling(s);spouse                   Abuse/Neglect       Row Name 12/23/23 1647       Personal Safety    Feels  Unsafe at Home or Work/School no    Feels Threatened by Someone no    Does Anyone Try to Keep You From Having Contact with Others or Doing Things Outside Your Home? no    Physical Signs of Abuse Present no                   Legal    No documentation.                  Substance Abuse       Row Name 12/23/23 4855       Substance Use    Substance Use Comment mom reports using THC until around 12 weeks, denies knowingly taking anything with fentanyl in it.                   Patient Forms    No documentation.                     [place]

## 2023-12-23 NOTE — ANESTHESIA POSTPROCEDURE EVALUATION
"Patient: Chiquita Galvez    Procedure Summary       Date: 23 Room / Location:  [facility]    Anesthesia Start: 731 Anesthesia Stop: 846    Procedure:  SECTION REPEAT with Mirena IUD insertion (Abdomen) Diagnosis:       Maternal care due to low transverse uterine scar from previous  delivery      (Maternal care due to low transverse uterine scar from previous  delivery [O34.211])    Surgeons: Heather Eckert MD Provider: Kip Loaiza MD    Anesthesia Type: spinal ASA Status: 2            Anesthesia Type: spinal    Vitals  Vitals Value Taken Time   BP 99/62 23 1040   Temp 36.3 °C (97.3 °F) 23 1015   Pulse 67 23 1040   Resp 16 23 1030   SpO2 93 % 23 1039   Vitals shown include unfiled device data.        Post Anesthesia Care and Evaluation    Patient location during evaluation: bedside  Patient participation: complete - patient participated  Level of consciousness: awake and alert  Pain management: adequate    Airway patency: patent  Anesthetic complications: No anesthetic complications    Cardiovascular status: acceptable  Respiratory status: acceptable  Hydration status: acceptable    Comments: BP 90/56   Pulse 65   Temp 36.3 °C (97.3 °F) (Oral)   Resp 16   Ht 160 cm (63\")   Wt 69.7 kg (153 lb 9.6 oz)   LMP 2022 (Exact Date)   SpO2 94%   Breastfeeding Unknown   BMI 27.21 kg/m²     "

## 2023-12-23 NOTE — PLAN OF CARE
Problem: Adult Inpatient Plan of Care  Goal: Plan of Care Review  Outcome: Ongoing, Progressing  Flowsheets (Taken 12/23/2023 1845)  Progress: improving  Plan of Care Reviewed With: patient  Outcome Evaluation: VSS, fundus and lochia WDL, incision SOFÍA, dressing dry and intact. Pain control with IV and PO meds and ice pack. Up x1, tolerated well. Working on bottle feeding infant.  Goal: Patient-Specific Goal (Individualized)  Outcome: Ongoing, Progressing  Goal: Absence of Hospital-Acquired Illness or Injury  Outcome: Ongoing, Progressing  Intervention: Identify and Manage Fall Risk  Description: Perform standard risk assessment on admission using a validated tool or comprehensive approach appropriate to the patient; reassess fall risk frequently, with change in status or transfer to another level of care.  Communicate fall injury risk to interprofessional healthcare team.  Determine need for increased observation, equipment and environmental modification, such as low bed, signage and supportive, nonskid footwear.  Adjust safety measures to individual developmental age, stage and identified risk factors.  Reinforce the importance of safety and physical activity with patient and family.  Perform regular intentional rounding to assess need for position change, pain assessment and personal needs, including assistance with toileting.  Recent Flowsheet Documentation  Taken 12/23/2023 1819 by Miya Spence RN  Safety Promotion/Fall Prevention: safety round/check completed  Taken 12/23/2023 1630 by Miya Spence RN  Safety Promotion/Fall Prevention: safety round/check completed  Taken 12/23/2023 1538 by Miya Spence RN  Safety Promotion/Fall Prevention: safety round/check completed  Taken 12/23/2023 1436 by Miya Spence RN  Safety Promotion/Fall Prevention: safety round/check completed  Taken 12/23/2023 1332 by Miya Spence RN  Safety Promotion/Fall Prevention: safety round/check completed  Taken  12/23/2023 1225 by Miya Spence RN  Safety Promotion/Fall Prevention: safety round/check completed  Taken 12/23/2023 1138 by Miya Spence RN  Safety Promotion/Fall Prevention: safety round/check completed  Taken 12/23/2023 1105 by Miya Spence RN  Safety Promotion/Fall Prevention: safety round/check completed  Intervention: Prevent Skin Injury  Description: Perform a screening for skin injury risk, such as pressure or moisture associated skin damage on admission and at regular intervals throughout hospital stay.  Keep all areas of skin (especially folds) clean and dry.  Maintain adequate skin hydration.  Relieve and redistribute pressure and protect bony prominences; implement measures based on patient-specific risk factors.  Match turning and repositioning schedule to clinical condition.  Encourage weight shift frequently; assist with reposition if unable to complete independently.  Float heels off bed; avoid pressure on the Achilles tendon.  Keep skin free from extended contact with medical devices.  Encourage functional activity and mobility, as early as tolerated.  Use aids (e.g., slide boards, mechanical lift) during transfer.  Recent Flowsheet Documentation  Taken 12/23/2023 1105 by Miya Spence RN  Body Position: position changed independently  Intervention: Prevent and Manage VTE (Venous Thromboembolism) Risk  Description: Assess for VTE (venous thromboembolism) risk.  Encourage and assist with early ambulation.  Initiate and maintain compression or other therapy, as indicated, based on identified risk in accordance with organizational protocol and provider order.  Encourage both active and passive leg exercises while in bed, if unable to ambulate.  Recent Flowsheet Documentation  Taken 12/23/2023 1819 by Miya Spence RN  Activity Management: ambulated to bathroom  VTE Prevention/Management:   bilateral   sequential compression devices on  Taken 12/23/2023 1138 by Miya Spence RN  VTE  Prevention/Management:   bilateral   sequential compression devices on  Taken 12/23/2023 1105 by Miya Spence RN  Activity Management: bedrest  VTE Prevention/Management:   bilateral   sequential compression devices on  Intervention: Prevent Infection  Description: Maintain skin and mucous membrane integrity; promote hand, oral and pulmonary hygiene.  Optimize fluid balance, nutrition, sleep and glycemic control to maximize infection resistance.  Identify potential sources of infection early to prevent or mitigate progression of infection (e.g., wound, lines, devices).  Evaluate ongoing need for invasive devices; remove promptly when no longer indicated.  Recent Flowsheet Documentation  Taken 12/23/2023 1105 by Miya Spence RN  Infection Prevention:   hand hygiene promoted   rest/sleep promoted  Goal: Optimal Comfort and Wellbeing  Outcome: Ongoing, Progressing  Intervention: Monitor Pain and Promote Comfort  Description: Assess pain level, treatment efficacy and patient response at regular intervals using a consistent pain scale.  Consider the presence and impact of preexisting chronic pain.  Encourage patient and caregiver involvement in pain assessment, interventions and safety measures.  Recent Flowsheet Documentation  Taken 12/23/2023 1819 by Miya Spence RN  Pain Management Interventions:   see MAR   care clustered   cold applied  Taken 12/23/2023 1538 by Miya Spence RN  Pain Management Interventions:   see MAR   cold applied  Taken 12/23/2023 1138 by Miya Spence RN  Pain Management Interventions:   see MAR   cold applied  Intervention: Provide Person-Centered Care  Description: Use a family-focused approach to care.  Develop trust and rapport by proactively providing information, encouraging questions, addressing concerns and offering reassurance.  Acknowledge emotional response to hospitalization.  Recognize and utilize personal coping strategies.  Honor spiritual and cultural  preferences.  Recent Flowsheet Documentation  Taken 12/23/2023 1105 by Miya Spence RN  Trust Relationship/Rapport:   care explained   choices provided   questions answered   questions encouraged  Goal: Readiness for Transition of Care  Outcome: Ongoing, Progressing     Problem: Device-Related Complication Risk (Anesthesia/Analgesia, Neuraxial)  Goal: Safe Infusion Delivery Completion  Outcome: Ongoing, Progressing     Problem: Infection (Anesthesia/Analgesia, Neuraxial)  Goal: Absence of Infection Signs and Symptoms  Outcome: Ongoing, Progressing  Intervention: Prevent or Manage Infection  Description: Protect and secure insertion site; observe for signs of infection, sepsis or line dislodgement.  Provide meticulous dressing and catheter care to decrease risk of infection; maintain closed system.  Optimize fluid balance, nutrition, sleep and glycemic control to maximize resistance and promote healing.  Identify potential sources of infection early to prevent or mitigate progression of infection (e.g., wound, lines, devices).  Obtain cultures prior to initiating antimicrobial therapy, when possible. Do not delay treatment for laboratory results in the presence of high suspicion or clinical indicators.  Administer ordered antimicrobial therapy promptly; reassess need regularly.  Provide fever-reduction and comfort measures. Note: Maternal fever can occur during neuraxial analgesia/anesthesia that is not associated with infection. Source of temperature elevation should be investigated.  Recent Flowsheet Documentation  Taken 12/23/2023 1105 by Miya Spence RN  Infection Prevention:   hand hygiene promoted   rest/sleep promoted     Problem: Nausea and Vomiting (Anesthesia/Analgesia, Neuraxial)  Goal: Nausea and Vomiting Relief  Outcome: Ongoing, Progressing     Problem: Pain (Anesthesia/Analgesia, Neuraxial)  Goal: Effective Pain Control  Outcome: Ongoing, Progressing  Intervention: Prevent or Manage  Pain  Description: Determine pain management plan with patient and caregiver; review plan regularly.  Individualize pharmacologic pain management plan; titrate medication to patient response.  Combine multimodal analgesia and nonpharmacologic strategies to help potentiate synergistic effects, enhance comfort and improve function that may include complementary therapy, diversional activity and mindfulness.  Provide around-the-clock dosing of pain medication to keep pain levels in control.  Manage medication-induced effects, such as respiratory depression, constipation, nausea and vomiting.  Minimize pain stimuli; coordinate care and adjust environment (e.g., light, noise, unnecessary movement); promote sleep/rest for optimal healing.  Recent Flowsheet Documentation  Taken 12/23/2023 1819 by Miya Spence RN  Pain Management Interventions:   see MAR   care clustered   cold applied  Taken 12/23/2023 1538 by Miya Spence RN  Pain Management Interventions:   see MAR   cold applied  Taken 12/23/2023 1138 by Miya Spence RN  Pain Management Interventions:   see MAR   cold applied  Taken 12/23/2023 1105 by Miya Spence RN  Diversional Activities: smartphone     Problem: Respiratory Compromise (Anesthesia/Analgesia, Neuraxial)  Goal: Effective Oxygenation and Ventilation  Outcome: Ongoing, Progressing  Intervention: Optimize Oxygenation and Ventilation  Description: Use a validated screening tool to identify risk for obstructive sleep apnea prior to placement; monitor for signs of hypoventilation.  Implement continuous pulse oximetry to detect apnea-related oxygen desaturation events.  Maintain patent airway; assess need for additional respiratory support.  Elevate head of bed and position to minimize risk of ventilation/perfusion mismatch, airway collapse/obstruction and aspiration.  Stimulate patient to increase arousal and respiratory effort.  Encourage pulmonary hygiene, such as cough-enhancement and  airway-clearance techniques that may include use of incentive spirometry, deep breathing and cough.  Provide oxygen therapy judiciously, if hypoxemia present.  Recent Flowsheet Documentation  Taken 12/23/2023 1105 by Miya Spence RN  Head of Bed (HOB) Positioning: HOB elevated     Problem: Sensorimotor Impairment (Anesthesia/Analgesia, Neuraxial)  Goal: Baseline Motor Function  Outcome: Ongoing, Progressing  Intervention: Optimize Sensorimotor Function  Description: Protect skin and areas of decreased sensation from moisture, heat, pressure, friction or shearing forces.  Position body with joints in neutral alignment; facilitate frequent position changes.  Monitor vital signs, oxygenation and dermatomes for level of anesthesia.  Monitor motor strength and ability to safely ambulate.  Implement environmental safety measures to decrease fall risk (e.g., declutter, lighting, assistive devices); reassess risk frequently.  Recent Flowsheet Documentation  Taken 12/23/2023 1819 by Miya Spence RN  Safety Promotion/Fall Prevention: safety round/check completed  Taken 12/23/2023 1630 by Miya Spence RN  Safety Promotion/Fall Prevention: safety round/check completed  Taken 12/23/2023 1538 by Miya Spence RN  Safety Promotion/Fall Prevention: safety round/check completed  Taken 12/23/2023 1436 by Miya Spence RN  Safety Promotion/Fall Prevention: safety round/check completed  Taken 12/23/2023 1332 by Miya Spence RN  Safety Promotion/Fall Prevention: safety round/check completed  Taken 12/23/2023 1225 by Miya Spence RN  Safety Promotion/Fall Prevention: safety round/check completed  Taken 12/23/2023 1138 by Miya Spence RN  Safety Promotion/Fall Prevention: safety round/check completed  Taken 12/23/2023 1105 by Miya Spence RN  Safety Promotion/Fall Prevention: safety round/check completed     Problem: Urinary Retention (Anesthesia/Analgesia, Neuraxial)  Goal: Effective Urinary  Elimination  Outcome: Ongoing, Progressing     Problem:  Fall Injury Risk  Goal: Absence of Fall, Infant Drop and Related Injury  Outcome: Ongoing, Progressing  Intervention: Identify and Manage Contributors  Description: Develop a fall prevention plan with the patient and family.  Promote use of personal vision and auditory aids.  Assess infant location, status and maternal assistance level required for safe and effective self and infant care; provide support for toileting, mobility and placement of infant in crib, as needed.  Define behavior and activity limits to patient and family to decrease fall or drop risk.  If fall occurs, assess the severity of injury; implement fall injury protocol. Determine the cause and revise fall injury prevention plan.  If fall occurs during pregnancy, assess fetal heart rate and movement, presence of uterine contractions or vaginal bleeding; verify membrane status.  Regularly review medication and contribution to fall risk; consider polypharmacy and high-risk medications that may include neuraxial anesthesia, sedation and narcotic analgesia given within the last 24 hours.  Balance adequate pain management with potential for oversedation.  Recent Flowsheet Documentation  Taken 2023 1105 by Miya Spence RN  Medication Review/Management: medications reviewed  Intervention: Promote Injury-Free Environment  Description: Provide a safe, barrier-free environment that encourages independent activity.  Keep care area uncluttered and well-lighted.  Determine the need for increased observation or monitoring.  Avoid use of devices that minimize mobility, such as restraints or indwelling urinary catheter.  Recent Flowsheet Documentation  Taken 2023 1819 by Miya Spence, RN  Safety Promotion/Fall Prevention: safety round/check completed  Taken 2023 1630 by Miya Spence RN  Safety Promotion/Fall Prevention: safety round/check completed  Taken 2023 1538  by Tashiro, Miya, RN  Safety Promotion/Fall Prevention: safety round/check completed  Taken 12/23/2023 1436 by Miya Spence RN  Safety Promotion/Fall Prevention: safety round/check completed  Taken 12/23/2023 1332 by Miya Spence RN  Safety Promotion/Fall Prevention: safety round/check completed  Taken 12/23/2023 1225 by Miya Spence RN  Safety Promotion/Fall Prevention: safety round/check completed  Taken 12/23/2023 1138 by Miya Spence RN  Safety Promotion/Fall Prevention: safety round/check completed  Taken 12/23/2023 1105 by Miya Spence RN  Safety Promotion/Fall Prevention: safety round/check completed     Problem: Skin Injury Risk Increased  Goal: Skin Health and Integrity  Outcome: Ongoing, Progressing  Intervention: Optimize Skin Protection  Description: Perform a full pressure injury risk assessment, as indicated by screening, upon admission to care unit.  Reassess skin (injury risk, full inspection) frequently (e.g., scheduled interval, with change in condition) to provide optimal early detection and prevention.  Maintain adequate tissue perfusion (e.g., encourage fluid balance; avoid crossing legs, constrictive clothing or devices) to promote tissue oxygenation.  Maintain head of bed at lowest degree of elevation tolerated, considering medical condition and other restrictions.  Avoid positioning onto an area that remains reddened.  Minimize incontinence and moisture (e.g., toileting schedule; moisture-wicking pad, diaper or incontinence collection device; skin moisture barrier).  Cleanse skin promptly and gently when soiled utilizing a pH-balanced cleanser.  Relieve and redistribute pressure (e.g., scheduled position changes, weight shifts, use of support surface, medical device repositioning, protective dressing application, use of positioning device, microclimate control, use of pressure-injury-monitor  Encourage increased activity, such as sitting in a chair at the bedside or  early mobilization, when able to tolerate.  Recent Flowsheet Documentation  Taken 12/23/2023 1105 by Miya Spence, RN  Head of Bed (HOB) Positioning: HOB elevated   Goal Outcome Evaluation:  Plan of Care Reviewed With: patient        Progress: improving  Outcome Evaluation: VSS, fundus and lochia WDL, incision SOFÍA, dressing dry and intact. Pain control with IV and PO meds and ice pack. Up x1, tolerated well. Working on bottle feeding infant.

## 2023-12-23 NOTE — PLAN OF CARE
Goal Outcome Evaluation:  Plan of Care Reviewed With: patient, significant other        Progress: improving  Outcome Evaluation: s/p c/s, pain and bleeding controlled at this time. IUD placed during c/s. Assisting with holding infant PRN.

## 2023-12-23 NOTE — H&P
[facility]   Obstetrics and Gynecology   History & Physical    2023    Patient: Chiquita Galvez          MR#:[id number]    Chief complaint:  repeat     Subjective     28 y.o. female  at 39w0d presents for repeat  with Mirena IUD insertion.  She is doing well today and without complaint.  Denies contractions, loss of fluid, and vaginal bleeding.  Endorses regular fetal movements.  This is patient's seventh delivery and fourth  today.  She has a history of postpartum hemorrhage after her fourth delivery.  She was severely anemic at the beginning of pregnancy and received several doses of IV iron.  Hemoglobin today is 11.1.  We have discussed that she is at elevated risk of postpartum hemorrhage so we will take several precautions to minimize that risk.  She is type and cross for PRBC.  We have discussed the possibility of hysterectomy if bleeding cannot be controlled.    Patient also has a history of syphilis status posttreatment.  Titers have been 1:1 for the entire pregnancy.  She also had GBS bacteriuria.      Patient Active Problem List   Diagnosis    Maternal care due to low transverse uterine scar from previous  delivery    History of postpartum hemorrhage, currently pregnant    Iron deficiency anemia during pregnancy    Iron adverse reaction    LGSIL on Pap smear of cervix    Current pregnancy with history of pre-term labor in third trimester    39 weeks gestation of pregnancy    History of prior pregnancy with IUGR     Nausea and vomiting during pregnancy    H/O syphilis    GBS bacteriuria       Past Medical History:   Diagnosis Date    History of anemia     pt states been recieving Fe infusions with current pregnancy    History of transfusion     multple blood transfusions during PREVIOUS pregnancy due to severe anemia. Iron infusions prior to delivery and preventative blood transfusion after deliveries with all pregnancies.    History of  urinary tract infection     Migraine     Syphilis 2019    [facility]       Past Surgical History:   Procedure Laterality Date     SECTION  2017     SECTION N/A 3/18/2020    Procedure:  SECTION REPEAT;  Surgeon: Bisi Bermudez MD;  Location:  [facility];  Service: Obstetrics/Gynecology;  Laterality: N/A;     SECTION Bilateral 3/28/2022    Procedure:  SECTION PRIMARY;  Surgeon: Brooks Irwin MD;  Location:  [facility];  Service: Obstetrics/Gynecology;  Laterality: Bilateral;    DILATATION AND CURETTAGE      Miscarriage       Obstetric History:  OB History          9    Para   7    Term   5       2    AB   1    Living   7         SAB   1    IAB   0    Ectopic   0    Molar   0    Multiple   0    Live Births   7          Obstetric Comments   -10/16: CRL c/w 10+6wga, final MARISA 22  -: normal anatomy but several limited views, abnormal placenta  -: normal anatomy, posterior placenta, DVP 3.8cm, EFW 23%ile, AC 8%ile, CL 4.3cm              Menstrual History:     Patient's last menstrual period was 2022 (exact date).       # 1 - Date: 09, Sex: None, Weight: None, GA: 9w0d, Delivery: None, Apgar1: None, Apgar5: None, Living: None, Birth Comments: required D&C for retained placenta    # 2 - Date: 12, Sex: Male, Weight: 3742 g (8 lb 4 oz), GA: 40w0d, Delivery: Vaginal, Spontaneous, Apgar1: None, Apgar5: None, Living: Living, Birth Comments: del note reviewed - no comps - [facility]     # 3 - Date: 13, Sex: Male, Weight: 3720 g (8 lb 3.2 oz), GA: 40w3d, Delivery: Vaginal, Spontaneous, Apgar1: 8, Apgar5: 9, Living: Living, Birth Comments: del note reviewed - no comps     # 4 - Date: 14, Sex: Female, Weight: 3555 g (7 lb 13.4 oz), GA: 40w0d, Delivery: Vaginal, Spontaneous, Apgar1: 8, Apgar5: 9, Living: Living, Birth Comments: del note reviewed - no comps - [facility]     # 5 - Date:  11/08/15, Sex: Male, Weight: 3626 g (7 lb 15.9 oz), GA: 38w2d, Delivery: Vaginal, Spontaneous, Apgar1: 8, Apgar5: 9, Living: Living, Birth Comments: del note reviewed - [facility]     # 6 - Date: 17, Sex: Female, Weight: 1990 g (4 lb 6.2 oz), GA: 34w1d, Delivery: , Low Transverse, Apgar1: 4, Apgar5: 8, Living: Living, Birth Comments: SIMRAN 4/10     # 7 - Date: 20, Sex: Male, Weight: 2765 g (6 lb 1.5 oz), GA: 37w4d, Delivery: , Low Transverse, Apgar1: 8, Apgar5: 8, Living: Living, Birth Comments: Vadim Arambula    # 8 - Date: 22, Sex: Female, Weight: 1960 g (4 lb 5.1 oz), GA: 34w1d, Delivery: , Low Transverse, Apgar1: 8, Apgar5: 9, Living: Living, Birth Comments: panda 1    # 9 - Date: None, Sex: None, Weight: None, GA: None, Delivery: None, Apgar1: None, Apgar5: None, Living: None, Birth Comments: None      Prenatal Information:  Prenatal Results       Initial Prenatal Labs       Test Value Reference Range Date Time    Hemoglobin  10.6 g/dL 11.1 - 15.9 23 1137    Hematocrit  34.1 % 34.0 - 46.6 23 1137    Platelets  288 x10E3/uL 150 - 450 23 1137    Rubella IgG  7.31 index Immune >0.99 23 1137    Hepatitis B SAg  Negative  Negative 23 1137    Hepatitis C Ab  Non Reactive  Non Reactive 23 1137    RPR  Reactive  Non Reactive 23 1242       Reactive  Non Reactive 23 1137    T. Pallidum Ab         ABO  B   23 0601    Rh  Positive   23 0601    Antibody Screen  Negative  Negative 23 1137    HIV  Non Reactive  Non Reactive 23 1242       Non Reactive  Non Reactive 23 1137    Urine Culture  No growth   23 1042       25,000 CFU/mL Normal Urogenital Ely   23 1709       25,000 CFU/mL Streptococcus agalactiae (Group B)   23 1919       Final report   10/25/23 1512       Final report   23 1738    Gonorrhea  Negative  Negative 23 1720    Chlamydia  Negative  Negative 23 1720    TSH  1.110  uIU/mL 0.270 - 4.200 02/12/20 1026    HgB A1c         Varicella IgG  1,585 index Immune >165 06/08/23 1137    HgB Electrophoresis         Cystic fibrosis                   Fetal testing        Test Value Reference Range Date Time    NIPT        MSAFP  *Screen Negative*   08/03/23 1413    AFP-4                  2nd and 3rd Trimester       Test Value Reference Range Date Time    Hemoglobin (repeated)  11.1 g/dL 12.0 - 15.9 12/23/23 0601       11.5 g/dL 11.1 - 15.9 11/22/23 1242       7.9 g/dL 12.0 - 15.9 09/27/23 1150       8.6 g/dL 12.0 - 15.9 09/05/23 1212    Hematocrit (repeated)  35.1 % 34.0 - 46.6 12/23/23 0601       35.9 % 34.0 - 46.6 11/22/23 1242       26.3 % 34.0 - 46.6 09/27/23 1150       27.8 % 34.0 - 46.6 09/05/23 1212    Platelets   246 10*3/mm3 140 - 450 12/23/23 0601       243 x10E3/uL 150 - 450 11/22/23 1242       334 10*3/mm3 140 - 450 09/27/23 1150       284 10*3/mm3 140 - 450 09/05/23 1212       288 x10E3/uL 150 - 450 06/08/23 1137    GCT  53 mg/dL 65 - 139 09/27/23 1150    Antibody Screen (repeated)  Negative   12/23/23 0601       Negative  Negative 06/08/23 1137    Third Trimester syphilis screen (repeated)   Reactive  Non Reactive 11/22/23 1242       Reactive  Non Reactive 06/08/23 1137    GTT Fasting        GTT 1 Hr        GTT 2 Hr        GTT 3 Hr        Group B Strep                  Other testing        Test Value Reference Range Date Time    Parvo IgG         CMV IgG                   Drug Screening       Test Value Reference Range Date Time    Amphetamine Screen  Negative ng/mL Dauepe=9071 06/08/23 1736    Barbiturate Screen  Negative ng/mL Ofjvvg=188 06/08/23 1736    Benzodiazepine Screen  Negative ng/mL Xtdkgo=801 06/08/23 1736    Methadone Screen  Negative ng/mL Gbwgmc=333 06/08/23 1736    Phencyclidine Screen  Negative ng/mL Cutoff=25 06/08/23 1736    Opiates Screen  Negative ng/mL Zqppjt=137 06/08/23 1736    THC Screen  Positive ng/mL Cutoff=20 06/08/23 1736    Cocaine Screen  Negative  ng/mL Heixde=980 06/08/23 1736    Propoxyphene Screen  Negative ng/mL Anqfsk=351 06/08/23 1736    Buprenorphine Screen        Methamphetamine Screen        Oxycodone Screen        Tricyclic Antidepressants Screen                  Legend    ^: Historical                          External Prenatal Results       Pregnancy Outside Results - Transcribed From Office Records - See Scanned Records For Details       Test Value Date Time    ABO  B  12/23/23 0601    Rh  Positive  12/23/23 0601    Antibody Screen  Negative  12/23/23 0601       Negative  06/08/23 1137    Varicella IgG  1,585 index 06/08/23 1137    Rubella  7.31 index 06/08/23 1137    Hgb  11.1 g/dL 12/23/23 0601       11.5 g/dL 11/22/23 1242       7.9 g/dL 09/27/23 1150       8.6 g/dL 09/05/23 1212       10.6 g/dL 06/08/23 1137    Hct  35.1 % 12/23/23 0601       35.9 % 11/22/23 1242       26.3 % 09/27/23 1150       27.8 % 09/05/23 1212       34.1 % 06/08/23 1137    Glucose Fasting GTT       Glucose Tolerance Test 1 hour       Glucose Tolerance Test 3 hour  110 mg/dL 02/20/20 1041    Gonorrhea (discrete)  Negative  06/08/23 1720    Chlamydia (discrete)  Negative  06/08/23 1720    RPR  Reactive  11/22/23 1242       Reactive  06/08/23 1137    VDRL       Syphilis Antibody  Reactive  11/22/23 1242       Reactive  06/08/23 1137    HBsAg  Negative  06/08/23 1137    Herpes Simplex Virus PCR       Herpes Simplex VIrus Culture       HIV  Non Reactive  11/22/23 1242       Non Reactive  06/08/23 1137    Hep C RNA Quant PCR       Hep C Antibody  Non Reactive  06/08/23 1137    AFP  67.3 ng/mL 08/03/23 1413    Group B Strep  Negative  03/11/20 1634    GBS Susceptibility to Clindamycin       GBS Susceptibility to Erythromycin       Fetal Fibronectin       Genetic Testing, Maternal Blood                 Drug Screening       Test Value Date Time    Urine Drug Screen       Amphetamine Screen  Negative ng/mL 06/08/23 1736    Barbiturate Screen  Negative ng/mL 06/08/23 1736     Benzodiazepine Screen  Negative ng/mL 06/08/23 1736    Methadone Screen  Negative ng/mL 06/08/23 1736    Phencyclidine Screen  Negative ng/mL 06/08/23 1736    Opiates Screen  Negative  03/27/22 1938    THC Screen  Negative  03/27/22 1938    Cocaine Screen       Propoxyphene Screen  Negative ng/mL 06/08/23 1736    Buprenorphine Screen       Methamphetamine Screen       Oxycodone Screen  Negative  03/27/22 1938    Tricyclic Antidepressants Screen                 Legend    ^: Historical                              Family History   Problem Relation Age of Onset    Lupus Father     Cancer Mother     Breast cancer Neg Hx     Ovarian cancer Neg Hx     Colon cancer Neg Hx     Endometrial cancer Neg Hx     Uterine cancer Neg Hx        Social History     Tobacco Use    Smoking status: Never     Passive exposure: Never    Smokeless tobacco: Never   Vaping Use    Vaping Use: Never used   Substance Use Topics    Alcohol use: Never    Drug use: Yes     Frequency: 1.0 times per week     Types: Marijuana     Comment: Pt states she stopped when she was 3 months pregnant       Patient has no known allergies.      Current Facility-Administered Medications:     carboprost (HEMABATE) injection 250 mcg, 250 mcg, Intramuscular, PRN, Heather Eckert MD    ceFAZolin in dextrose (ANCEF) IVPB solution 2 g, 2 g, Intravenous, Once, Heather Eckert MD    famotidine (PEPCID) injection 20 mg, 20 mg, Intravenous, Once, Kip Loaiza MD    ketorolac (TORADOL) injection 30 mg, 30 mg, Intravenous, Once, Heather Eckert MD    lactated ringers bolus 1,000 mL, 1,000 mL, Intravenous, Once, Heather Eckert MD    lactated ringers infusion, 125 mL/hr, Intravenous, Continuous, Heather Eckert MD, Last Rate: 125 mL/hr at 12/23/23 0602, 125 mL/hr at 12/23/23 0602    lidocaine (XYLOCAINE) 1 % injection 0.5 mL, 0.5 mL, Intradermal, Once PRN, Heather Eckert MD    methylergonovine (METHERGINE) injection 200 mcg, 200 mcg,  Intramuscular, PRN, Heather Eckert MD    miSOPROStol (CYTOTEC) tablet 800 mcg, 800 mcg, Rectal, PRN, Heather Eckert MD    ondansetron (ZOFRAN) tablet 4 mg, 4 mg, Oral, Q6H PRN **OR** ondansetron (ZOFRAN) injection 4 mg, 4 mg, Intravenous, Q6H PRN, Heather Eckert MD    oxytocin (PITOCIN) 30 units in 0.9% sodium chloride 500 mL (premix), 999 mL/hr, Intravenous, Once **FOLLOWED BY** oxytocin (PITOCIN) 30 units in 0.9% sodium chloride 500 mL (premix), 250 mL/hr, Intravenous, Continuous, Heather Eckert MD    sodium chloride 0.9 % flush 10 mL, 10 mL, Intravenous, Q12H, Heather Eckert MD    sodium chloride 0.9 % flush 10 mL, 10 mL, Intravenous, PRN, Heather Eckert MD    sodium chloride 0.9 % infusion 40 mL, 40 mL, Intravenous, PRN, Heather Eckert MD    Review of Systems  Review of Systems   All other systems reviewed and are negative.      Objective     Vital Signs  Temp:  [98.1 °F (36.7 °C)] 98.1 °F (36.7 °C)  Heart Rate:  [89] 89  Resp:  [16] 16  BP: (92)/(60) 92/60    Physical Exam:  Physical Exam  Vitals and nursing note reviewed.   Constitutional:       General: She is not in acute distress.     Appearance: Normal appearance.   HENT:      Head: Normocephalic and atraumatic.   Eyes:      Extraocular Movements: Extraocular movements intact.   Cardiovascular:      Rate and Rhythm: Normal rate.   Pulmonary:      Effort: Pulmonary effort is normal. No respiratory distress.   Abdominal:      General: There is no distension.      Palpations: Abdomen is soft. There is no mass.      Tenderness: There is no abdominal tenderness.   Musculoskeletal:         General: Normal range of motion.      Cervical back: Normal range of motion.   Skin:     General: Skin is warm and dry.   Neurological:      General: No focal deficit present.      Mental Status: She is alert and oriented to person, place, and time.   Psychiatric:         Mood and Affect: Mood normal.         Behavior: Behavior  normal.           Hospital problem list:    Maternal care due to low transverse uterine scar from previous  delivery    History of postpartum hemorrhage, currently pregnant    Iron deficiency anemia during pregnancy    LGSIL on Pap smear of cervix    Current pregnancy with history of pre-term labor in third trimester    39 weeks gestation of pregnancy    History of prior pregnancy with IUGR     Nausea and vomiting during pregnancy    H/O syphilis    GBS bacteriuria      Assessment & Plan     1. Intrauterine pregnancy at 39w0d with h/o 4 prior cesareans presents for repeat  with Mirena IUD insertion  -Reviewed procedure with patient.  I discussed the risks including but not limited to bleeding, infection and damage to internal organs.  Understanding of the procedure is voiced.   -We have discussed that she is at elevated risk of postpartum hemorrhage so we will take several precautions to minimize that risk.  She is type and crossed for PRBC.  All uterotonics are available in OR.  We have discussed the possibility of hysterectomy if bleeding cannot be controlled.    2. Iron deficiency anemia - Plan for IV iron in the morning if Hb <10.  Patient does not tolerate po iron and has limited transportation so will optimize prior to discharge.  3. H/o syphilis - titer 1:1, all other infectious testing neg    Heather Eckert MD  23  07:11 EST      Patient Care Team:  Provider, No Known as PCP - Roxie Cabrales MD as Consulting Physician (Hematology and Oncology)  Heather Eckert MD as Obstetrician (Gynecology)  Sofy Pinto, RN as Nurse Navigator (Obstetrics)  Volodymyr Paredes, RN as Nurse Navigator (Obstetrics)

## 2023-12-23 NOTE — L&D DELIVERY NOTE
[facility]   Obstetrics and Gynecology     2023    Patient:Chiquita Galvez   MR#:[id number]     Section Procedure Note    Indications: previous  section low transverse    Pre-operative Diagnosis: Intrauterine pregnancy at 39w0d    Post-operative Diagnosis: same    Procedure:  Low transverse  section and Mirena IUD insertion    Surgeon: Heather Eckert MD     Assistants: Chacorta Nelson MD    Anesthesia: Spinal anesthesia    Prenatal care problem list:  Patient Active Problem List   Diagnosis    Maternal care due to low transverse uterine scar from previous  delivery    History of postpartum hemorrhage, currently pregnant    Iron deficiency anemia during pregnancy    Iron adverse reaction    LGSIL on Pap smear of cervix    Current pregnancy with history of pre-term labor in third trimester    39 weeks gestation of pregnancy    History of prior pregnancy with IUGR     Nausea and vomiting during pregnancy    H/O syphilis    GBS bacteriuria     delivery delivered       Procedure Details   The patient was seen in the LDR preoperatively. The risks, benefits, complications, treatment options, and expected outcomes were discussed with the patient.  The patient concurred with the proposed plan, giving informed [id number].  The site of surgery is discussed. The patient was taken to Operating Room # 1, identified as Chiquita Galvez and the procedure verified as  Delivery. A Time Out was held and the above information confirmed.    After induction of anesthesia, the patient was draped and prepped in the usual sterile manner. A Pfannenstiel incision was made and carried down through the subcutaneous tissue to the fascia. Fascial incision was made and extended transversely. The fascia was  from the underlying rectus tissue superiorly and inferiorly. Dense adhesions were encountered and taken down.  The peritoneum was identified and entered.  Peritoneal incision was extended longitudinally.  A very thin lower uterine segment was noted.  A low transverse uterine incision was made.  Delivered from vertex presentation was a female  fetus 3420 g (7 lb 8.6 oz)  with Apgar scores of 8 at one minute and 9 at five minutes. Umbilical cord was clamped and cut after a 30-second delay.  Cord blood was obtained for evaluation. The placenta was removed intact and appeared normal. Tranexamic acid was administered IV by Anesthesia.  Mirena IUD was inserted through hysterotomy and placed at fundus.  Strings were placed through the cervix with ring forceps.  The uterine outline, tubes and ovaries appeared normal.  The uterine incision was closed with running locked sutures of 0 monocryl. A second imbricating layer of the same suture was placed.  Excellent hemostasis was observed.  The posterior cul-de-sac was cleared of all blood.  The uterus was returned to the abdomen.  The paracolic gutters were cleared of all blood.  The uterus was reexamined and excellent hemostasis was confirmed.    Peritoneum was closed with 2-0 Vicryl in a running fashion.  The fascia was then reapproximated with running sutures of 0 Vicryl.  OB Anesthetic Cocktail was injected into subcutaneous tissue.  The deep subcutaneous layer was reapproximated with 3-0 monocryl in a running fashion. The skin was reapproximated with 4-0 monocryl in a subcuticular fashion.  Cocktail was injected intradermally.  Dressing was placed.      Instrument, sponge, and needle counts were correct prior to abdominal closure and at the conclusion of the case.     At end of procedure, patient was placed in frog leg position.  Fundal massage was applied, expressing approximately 200cc of blood clot.  IM methergine was administered.  Uterus was firm and hemostatic.  Speculum was inserted.  IUD strings were trimmed to 3cm.  Speculum was removed.  Patient was returned to supine position.    Surgical assistant was responsible  for performing the following activities: Retraction, Suction, Irrigation, Closing, Placing Dressing and Delivery of Fetus and their skilled assistance was necessary for the success of this case.    Findings:  YOB: 2025   Time of birth: 7:57 AM   Live, viable female infant  Baby weight: 3420 g (7 lb 8.6 oz) ]          APGARS  One minute Five minutes   Skin color: 0   1     Heart rate: 2   2     Grimace: 2   2     Muscle tone: 2   2     Breathin   2     Totals: 8   9       Dense adhesions between all abdominal wall layers  Normal gravid uterus with very thin, bulging lower uterine segment  Normal bilateral fallopian tubes and ovaries    Estimated Blood Loss:   700cc    Calculated Blood Loss:  Quantitative Blood Loss (mL): 722 mL           Specimens:  Placenta            Complications:  None; patient tolerated the procedure well.           Disposition: PACU - hemodynamically stable.           Condition: stable    Cord gases:    pH, Cord Venous   Date Value Ref Range Status   2023 7.334 7.260 - 7.400 pH Units Final     Comment:     Serial Number: [id number]:  [id number]     Base Excess, Cord Venous   Date Value Ref Range Status   2023 -3.8 -30.0 - 30.0 mmol/L Final       Heather Eckert MD  2023   09:01 EST

## 2023-12-23 NOTE — ANESTHESIA PREPROCEDURE EVALUATION
Anesthesia Evaluation     NPO Solid Status: > 8 hours             Airway   Mallampati: II  Dental - normal exam     Pulmonary    (-) wheezes  Cardiovascular     Rhythm: regular        Neuro/Psych  GI/Hepatic/Renal/Endo      Musculoskeletal     Abdominal    Substance History      OB/GYN    (+) Pregnant    Comment: 39 0/7      Other                      Anesthesia Plan    ASA 2     spinal     (Discussed with patient SAB and risk of PDPH, the possible need for blood patch in the future, discomfort during placement or delivery, SOA, possible failed SAB and need for GETA and the risks associated with GA and CS, N&V, and itching from intrathecal morphine.  Patient understands and wishes to have SAB for CS.)    Anesthetic plan, risks, benefits, and alternatives have been provided, discussed and informed consent has been obtained with: patient.    CODE STATUS:    Code Status (Patient has no pulse and is not breathing): CPR (Attempt to Resuscitate)  Medical Interventions (Patient has pulse or is breathing): Full Support

## 2023-12-23 NOTE — ANESTHESIA PROCEDURE NOTES
Spinal Block      Patient location during procedure: OB  Performed By  Anesthesiologist: Kip Loaiza MD  Preanesthetic Checklist  Completed: patient identified, IV checked, site marked, risks and benefits discussed, surgical consent, monitors and equipment checked, pre-op evaluation and timeout performed  Spinal Block Prep:  Sterile Tech:cap, gloves and sterile barriers  Patient Monitoring:EKG, continuous pulse oximetry and blood pressure monitoring    Spinal Block Procedure  Approach:midline  Guidance:landmark technique  Location:L4-L5  Needle Type:Sprotte  Needle Gauge:24 G    Fluid Appearance:clear  Medications: Morphine sulfate (PF) injection - Spinal   200 mcg - 12/23/2023 7:44:00 AM  bupivacaine PF (MARCAINE) 0.75 % injection - Spinal   1.4 mL - 12/23/2023 7:44:00 AM   Post Assessment  Patient Tolerance:patient tolerated the procedure well with no apparent complications  Complications no

## 2023-12-24 LAB
BASOPHILS # BLD AUTO: 0.05 10*3/MM3 (ref 0–0.2)
BASOPHILS NFR BLD AUTO: 0.4 % (ref 0–1.5)
DEPRECATED RDW RBC AUTO: 62.3 FL (ref 37–54)
EOSINOPHIL # BLD AUTO: 0.06 10*3/MM3 (ref 0–0.4)
EOSINOPHIL NFR BLD AUTO: 0.5 % (ref 0.3–6.2)
ERYTHROCYTE [DISTWIDTH] IN BLOOD BY AUTOMATED COUNT: 22.9 % (ref 12.3–15.4)
HCT VFR BLD AUTO: 30.6 % (ref 34–46.6)
HGB BLD-MCNC: 10.1 G/DL (ref 12–15.9)
LYMPHOCYTES # BLD AUTO: 2.27 10*3/MM3 (ref 0.7–3.1)
LYMPHOCYTES NFR BLD AUTO: 18.1 % (ref 19.6–45.3)
MCH RBC QN AUTO: 26.4 PG (ref 26.6–33)
MCHC RBC AUTO-ENTMCNC: 33 G/DL (ref 31.5–35.7)
MCV RBC AUTO: 79.9 FL (ref 79–97)
MONOCYTES # BLD AUTO: 1.06 10*3/MM3 (ref 0.1–0.9)
MONOCYTES NFR BLD AUTO: 8.5 % (ref 5–12)
NEUTROPHILS NFR BLD AUTO: 71.9 % (ref 42.7–76)
NEUTROPHILS NFR BLD AUTO: 9.02 10*3/MM3 (ref 1.7–7)
PLATELET # BLD AUTO: 231 10*3/MM3 (ref 140–450)
PMV BLD AUTO: 10.6 FL (ref 6–12)
RBC # BLD AUTO: 3.83 10*6/MM3 (ref 3.77–5.28)
WBC NRBC COR # BLD AUTO: 12.53 10*3/MM3 (ref 3.4–10.8)

## 2023-12-24 PROCEDURE — 25010000002 KETOROLAC TROMETHAMINE PER 15 MG: Performed by: STUDENT IN AN ORGANIZED HEALTH CARE EDUCATION/TRAINING PROGRAM

## 2023-12-24 PROCEDURE — 0503F POSTPARTUM CARE VISIT: CPT | Performed by: STUDENT IN AN ORGANIZED HEALTH CARE EDUCATION/TRAINING PROGRAM

## 2023-12-24 PROCEDURE — 85025 COMPLETE CBC W/AUTO DIFF WBC: CPT | Performed by: STUDENT IN AN ORGANIZED HEALTH CARE EDUCATION/TRAINING PROGRAM

## 2023-12-24 PROCEDURE — 63710000001 DIPHENHYDRAMINE PER 50 MG: Performed by: STUDENT IN AN ORGANIZED HEALTH CARE EDUCATION/TRAINING PROGRAM

## 2023-12-24 PROCEDURE — 25010000002 IRON SUCROSE PER 1 MG: Performed by: STUDENT IN AN ORGANIZED HEALTH CARE EDUCATION/TRAINING PROGRAM

## 2023-12-24 PROCEDURE — 25810000003 SODIUM CHLORIDE 0.9 % SOLUTION 250 ML FLEX CONT: Performed by: STUDENT IN AN ORGANIZED HEALTH CARE EDUCATION/TRAINING PROGRAM

## 2023-12-24 RX ADMIN — KETOROLAC TROMETHAMINE 15 MG: 15 INJECTION, SOLUTION INTRAMUSCULAR; INTRAVENOUS at 08:55

## 2023-12-24 RX ADMIN — OXYCODONE HYDROCHLORIDE 10 MG: 10 TABLET ORAL at 14:57

## 2023-12-24 RX ADMIN — OXYCODONE HYDROCHLORIDE 10 MG: 10 TABLET ORAL at 20:26

## 2023-12-24 RX ADMIN — OXYCODONE HYDROCHLORIDE 5 MG: 5 TABLET ORAL at 06:42

## 2023-12-24 RX ADMIN — IBUPROFEN 600 MG: 600 TABLET, FILM COATED ORAL at 18:01

## 2023-12-24 RX ADMIN — IRON SUCROSE 300 MG: 20 INJECTION, SOLUTION INTRAVENOUS at 06:48

## 2023-12-24 RX ADMIN — OXYCODONE HYDROCHLORIDE 10 MG: 10 TABLET ORAL at 10:46

## 2023-12-24 RX ADMIN — ACETAMINOPHEN 1000 MG: 500 TABLET ORAL at 06:18

## 2023-12-24 RX ADMIN — ACETAMINOPHEN 650 MG: 325 TABLET, FILM COATED ORAL at 14:55

## 2023-12-24 RX ADMIN — DOCUSATE SODIUM 50MG AND SENNOSIDES 8.6MG 1 TABLET: 8.6; 5 TABLET, FILM COATED ORAL at 20:22

## 2023-12-24 RX ADMIN — KETOROLAC TROMETHAMINE 15 MG: 15 INJECTION, SOLUTION INTRAMUSCULAR; INTRAVENOUS at 02:51

## 2023-12-24 RX ADMIN — DIPHENHYDRAMINE HYDROCHLORIDE 50 MG: 25 CAPSULE ORAL at 06:19

## 2023-12-24 RX ADMIN — ACETAMINOPHEN 650 MG: 325 TABLET, FILM COATED ORAL at 20:22

## 2023-12-24 NOTE — PROGRESS NOTES
2023    Name:Chiquita Galvez    MR#:[id number]     Progress Note:  Post-Op Day 1  S/P    HD:1    Subjective   28 y.o. yo Female  s/p CS at 39w0d doing well. Pain well controlled. Tolerating regular diet and having flatus. Lochia normal.     Patient Active Problem List   Diagnosis    Maternal care due to low transverse uterine scar from previous  delivery    History of postpartum hemorrhage, currently pregnant    Iron deficiency anemia during pregnancy    Iron adverse reaction    LGSIL on Pap smear of cervix    Current pregnancy with history of pre-term labor in third trimester    39 weeks gestation of pregnancy    History of prior pregnancy with IUGR     Nausea and vomiting during pregnancy    H/O syphilis    GBS bacteriuria     delivery delivered    H/O  section complicating pregnancy        Objective    Vitals  Temp:  Temp:  [96.8 °F (36 °C)-99 °F (37.2 °C)] 98 °F (36.7 °C)  Temp src: Oral  BP:  BP: (84-99)/(42-63) 97/61  Pulse:  Heart Rate:  [] 88  RR:   Resp:  [16-18] 18  Weight: 69.7 kg (153 lb 9.6 oz)  BMI:  Body mass index is 27.21 kg/m².      General Awake, alert, no distress  Abdomen Soft, non-distended, fundus firm,  below umbilicus, appropriately tender  Incision  Intact, no erythema or exudate  Extremities Calves NT bilaterally         I/O last 3 completed shifts:  In: 2709 [I.V.:; Other:720]  Out: 2217 [Urine:1495; Blood:722]    LABS:   Lab Results   Component Value Date    WBC 12.53 (H) 2023    HGB 10.1 (L) 2023    HCT 30.6 (L) 2023    MCV 79.9 2023     2023       Infant: female       Assessment   1.  POD 1, doing well VSS. She has not ambulated much and states she is having moderate pain around her incision site. Bandage was removed and nursing notified to give ice packs. Encouraged ambulation today. She will be staying through tomorrow as baby is not cleared to go home.         Plan: Doing  well.  Routine postoperative care      Maternal care due to low transverse uterine scar from previous  delivery    History of postpartum hemorrhage, currently pregnant    Iron deficiency anemia during pregnancy    LGSIL on Pap smear of cervix    Current pregnancy with history of pre-term labor in third trimester    39 weeks gestation of pregnancy    History of prior pregnancy with IUGR     Nausea and vomiting during pregnancy    H/O syphilis    GBS bacteriuria     delivery delivered    H/O  section complicating pregnancy      Rosa Friend MD  2023 14:51 EST

## 2023-12-24 NOTE — PLAN OF CARE
Goal Outcome Evaluation:  Plan of Care Reviewed With: patient        Progress: improving  Outcome Evaluation: Assessment WDL, incisional dressing CDI, pain controlled, bonding with infant

## 2023-12-25 VITALS
RESPIRATION RATE: 16 BRPM | OXYGEN SATURATION: 98 % | SYSTOLIC BLOOD PRESSURE: 105 MMHG | WEIGHT: 153.6 LBS | HEART RATE: 76 BPM | TEMPERATURE: 98.3 F | HEIGHT: 63 IN | BODY MASS INDEX: 27.21 KG/M2 | DIASTOLIC BLOOD PRESSURE: 70 MMHG

## 2023-12-25 LAB
BH BB BLOOD EXPIRATION DATE: NORMAL
BH BB BLOOD EXPIRATION DATE: NORMAL
BH BB BLOOD TYPE BARCODE: 7300
BH BB BLOOD TYPE BARCODE: 7300
BH BB DISPENSE STATUS: NORMAL
BH BB DISPENSE STATUS: NORMAL
BH BB PRODUCT CODE: NORMAL
BH BB PRODUCT CODE: NORMAL
BH BB UNIT NUMBER: NORMAL
BH BB UNIT NUMBER: NORMAL
CROSSMATCH INTERPRETATION: NORMAL
CROSSMATCH INTERPRETATION: NORMAL
UNIT  ABO: NORMAL
UNIT  ABO: NORMAL
UNIT  RH: NORMAL
UNIT  RH: NORMAL

## 2023-12-25 PROCEDURE — 0503F POSTPARTUM CARE VISIT: CPT | Performed by: STUDENT IN AN ORGANIZED HEALTH CARE EDUCATION/TRAINING PROGRAM

## 2023-12-25 RX ORDER — OXYCODONE HYDROCHLORIDE 5 MG/1
5 TABLET ORAL EVERY 6 HOURS PRN
Qty: 12 TABLET | Refills: 0 | Status: SHIPPED | OUTPATIENT
Start: 2023-12-25 | End: 2023-12-25 | Stop reason: SDUPTHER

## 2023-12-25 RX ORDER — ACETAMINOPHEN 325 MG/1
650 TABLET ORAL EVERY 6 HOURS
Qty: 60 TABLET | Refills: 1 | Status: SHIPPED | OUTPATIENT
Start: 2023-12-25 | End: 2023-12-25 | Stop reason: SDUPTHER

## 2023-12-25 RX ORDER — IBUPROFEN 600 MG/1
600 TABLET ORAL EVERY 6 HOURS
Qty: 60 TABLET | Refills: 1 | Status: SHIPPED | OUTPATIENT
Start: 2023-12-25 | End: 2023-12-25 | Stop reason: SDUPTHER

## 2023-12-25 RX ORDER — IBUPROFEN 600 MG/1
600 TABLET ORAL EVERY 6 HOURS
Qty: 60 TABLET | Refills: 1 | Status: SHIPPED | OUTPATIENT
Start: 2023-12-25

## 2023-12-25 RX ORDER — AMOXICILLIN 250 MG
1 CAPSULE ORAL 2 TIMES DAILY
Qty: 60 TABLET | Refills: 1 | Status: SHIPPED | OUTPATIENT
Start: 2023-12-25 | End: 2024-01-04

## 2023-12-25 RX ORDER — ACETAMINOPHEN 325 MG/1
650 TABLET ORAL EVERY 6 HOURS
Qty: 60 TABLET | Refills: 1 | Status: SHIPPED | OUTPATIENT
Start: 2023-12-25 | End: 2024-01-04

## 2023-12-25 RX ORDER — AMOXICILLIN 250 MG
1 CAPSULE ORAL 2 TIMES DAILY
Qty: 60 TABLET | Refills: 1 | Status: SHIPPED | OUTPATIENT
Start: 2023-12-25 | End: 2023-12-25 | Stop reason: SDUPTHER

## 2023-12-25 RX ORDER — OXYCODONE HYDROCHLORIDE 5 MG/1
5 TABLET ORAL EVERY 6 HOURS PRN
Qty: 12 TABLET | Refills: 0 | Status: SHIPPED | OUTPATIENT
Start: 2023-12-25 | End: 2023-12-30

## 2023-12-25 RX ADMIN — ACETAMINOPHEN 650 MG: 325 TABLET, FILM COATED ORAL at 02:35

## 2023-12-25 RX ADMIN — OXYCODONE HYDROCHLORIDE 10 MG: 10 TABLET ORAL at 06:24

## 2023-12-25 RX ADMIN — ACETAMINOPHEN 650 MG: 325 TABLET, FILM COATED ORAL at 08:59

## 2023-12-25 RX ADMIN — DOCUSATE SODIUM 50MG AND SENNOSIDES 8.6MG 1 TABLET: 8.6; 5 TABLET, FILM COATED ORAL at 08:59

## 2023-12-25 RX ADMIN — OXYCODONE HYDROCHLORIDE 10 MG: 10 TABLET ORAL at 00:18

## 2023-12-25 RX ADMIN — IBUPROFEN 600 MG: 600 TABLET, FILM COATED ORAL at 00:18

## 2023-12-25 RX ADMIN — OXYCODONE HYDROCHLORIDE 10 MG: 10 TABLET ORAL at 11:14

## 2023-12-25 RX ADMIN — IBUPROFEN 600 MG: 600 TABLET, FILM COATED ORAL at 06:24

## 2023-12-25 NOTE — PLAN OF CARE
Problem: Adult Inpatient Plan of Care  Goal: Plan of Care Review  Outcome: Met  Flowsheets (Taken 12/25/2023 1329)  Progress: improving  Plan of Care Reviewed With: patient  Outcome Evaluation: VSS, assessment WDL, up ad kayleen voiding without difficulty, incision DELL WDL per provider, pain control with PO meds. Bottle feeding infant, infant to stay as nursery infant. Patient to board. DC today  Goal: Patient-Specific Goal (Individualized)  Outcome: Met  Goal: Absence of Hospital-Acquired Illness or Injury  Outcome: Met  Intervention: Identify and Manage Fall Risk  Description: Perform standard risk assessment on admission using a validated tool or comprehensive approach appropriate to the patient; reassess fall risk frequently, with change in status or transfer to another level of care.  Communicate fall injury risk to interprofessional healthcare team.  Determine need for increased observation, equipment and environmental modification, such as low bed, signage and supportive, nonskid footwear.  Adjust safety measures to individual developmental age, stage and identified risk factors.  Reinforce the importance of safety and physical activity with patient and family.  Perform regular intentional rounding to assess need for position change, pain assessment and personal needs, including assistance with toileting.  Recent Flowsheet Documentation  Taken 12/25/2023 1114 by Miya Spence RN  Safety Promotion/Fall Prevention: safety round/check completed  Taken 12/25/2023 1030 by Miya Spence RN  Safety Promotion/Fall Prevention: safety round/check completed  Taken 12/25/2023 0950 by Miya Spence RN  Safety Promotion/Fall Prevention: safety round/check completed  Taken 12/25/2023 0859 by Miya Spence RN  Safety Promotion/Fall Prevention: safety round/check completed  Intervention: Prevent Skin Injury  Description: Perform a screening for skin injury risk, such as pressure or moisture associated skin damage on  admission and at regular intervals throughout hospital stay.  Keep all areas of skin (especially folds) clean and dry.  Maintain adequate skin hydration.  Relieve and redistribute pressure and protect bony prominences; implement measures based on patient-specific risk factors.  Match turning and repositioning schedule to clinical condition.  Encourage weight shift frequently; assist with reposition if unable to complete independently.  Float heels off bed; avoid pressure on the Achilles tendon.  Keep skin free from extended contact with medical devices.  Encourage functional activity and mobility, as early as tolerated.  Use aids (e.g., slide boards, mechanical lift) during transfer.  Recent Flowsheet Documentation  Taken 12/25/2023 0859 by Miya Spence RN  Body Position: position changed independently  Intervention: Prevent and Manage VTE (Venous Thromboembolism) Risk  Description: Assess for VTE (venous thromboembolism) risk.  Encourage and assist with early ambulation.  Initiate and maintain compression or other therapy, as indicated, based on identified risk in accordance with organizational protocol and provider order.  Encourage both active and passive leg exercises while in bed, if unable to ambulate.  Recent Flowsheet Documentation  Taken 12/25/2023 0859 by Miya Spence RN  Activity Management: up ad kayleen  Intervention: Prevent Infection  Description: Maintain skin and mucous membrane integrity; promote hand, oral and pulmonary hygiene.  Optimize fluid balance, nutrition, sleep and glycemic control to maximize infection resistance.  Identify potential sources of infection early to prevent or mitigate progression of infection (e.g., wound, lines, devices).  Evaluate ongoing need for invasive devices; remove promptly when no longer indicated.  Recent Flowsheet Documentation  Taken 12/25/2023 0859 by Miya Spence RN  Infection Prevention:   hand hygiene promoted   rest/sleep promoted  Goal: Optimal  Comfort and Wellbeing  Outcome: Met  Intervention: Monitor Pain and Promote Comfort  Description: Assess pain level, treatment efficacy and patient response at regular intervals using a consistent pain scale.  Consider the presence and impact of preexisting chronic pain.  Encourage patient and caregiver involvement in pain assessment, interventions and safety measures.  Recent Flowsheet Documentation  Taken 12/25/2023 1114 by Miya Spence RN  Pain Management Interventions: see MAR  Taken 12/25/2023 0859 by Miya Spence RN  Pain Management Interventions:   see MAR   care clustered  Intervention: Provide Person-Centered Care  Description: Use a family-focused approach to care.  Develop trust and rapport by proactively providing information, encouraging questions, addressing concerns and offering reassurance.  Acknowledge emotional response to hospitalization.  Recognize and utilize personal coping strategies.  Honor spiritual and cultural preferences.  Recent Flowsheet Documentation  Taken 12/25/2023 0859 by Miya Spence RN  Trust Relationship/Rapport:   care explained   choices provided   questions answered   questions encouraged  Goal: Readiness for Transition of Care  Outcome: Met     Problem: Device-Related Complication Risk (Anesthesia/Analgesia, Neuraxial)  Goal: Safe Infusion Delivery Completion  Outcome: Met     Problem: Infection (Anesthesia/Analgesia, Neuraxial)  Goal: Absence of Infection Signs and Symptoms  Outcome: Met  Intervention: Prevent or Manage Infection  Description: Protect and secure insertion site; observe for signs of infection, sepsis or line dislodgement.  Provide meticulous dressing and catheter care to decrease risk of infection; maintain closed system.  Optimize fluid balance, nutrition, sleep and glycemic control to maximize resistance and promote healing.  Identify potential sources of infection early to prevent or mitigate progression of infection (e.g., wound, lines,  devices).  Obtain cultures prior to initiating antimicrobial therapy, when possible. Do not delay treatment for laboratory results in the presence of high suspicion or clinical indicators.  Administer ordered antimicrobial therapy promptly; reassess need regularly.  Provide fever-reduction and comfort measures. Note: Maternal fever can occur during neuraxial analgesia/anesthesia that is not associated with infection. Source of temperature elevation should be investigated.  Recent Flowsheet Documentation  Taken 12/25/2023 0859 by Miya Spence RN  Infection Prevention:   hand hygiene promoted   rest/sleep promoted     Problem: Nausea and Vomiting (Anesthesia/Analgesia, Neuraxial)  Goal: Nausea and Vomiting Relief  Outcome: Met     Problem: Pain (Anesthesia/Analgesia, Neuraxial)  Goal: Effective Pain Control  Outcome: Met  Intervention: Prevent or Manage Pain  Description: Determine pain management plan with patient and caregiver; review plan regularly.  Individualize pharmacologic pain management plan; titrate medication to patient response.  Combine multimodal analgesia and nonpharmacologic strategies to help potentiate synergistic effects, enhance comfort and improve function that may include complementary therapy, diversional activity and mindfulness.  Provide around-the-clock dosing of pain medication to keep pain levels in control.  Manage medication-induced effects, such as respiratory depression, constipation, nausea and vomiting.  Minimize pain stimuli; coordinate care and adjust environment (e.g., light, noise, unnecessary movement); promote sleep/rest for optimal healing.  Recent Flowsheet Documentation  Taken 12/25/2023 1114 by Miya Spence RN  Pain Management Interventions: see MAR  Taken 12/25/2023 0859 by Miya Spence RN  Pain Management Interventions:   see MAR   care clustered     Problem: Respiratory Compromise (Anesthesia/Analgesia, Neuraxial)  Goal: Effective Oxygenation and  Ventilation  Outcome: Met  Intervention: Optimize Oxygenation and Ventilation  Description: Use a validated screening tool to identify risk for obstructive sleep apnea prior to placement; monitor for signs of hypoventilation.  Implement continuous pulse oximetry to detect apnea-related oxygen desaturation events.  Maintain patent airway; assess need for additional respiratory support.  Elevate head of bed and position to minimize risk of ventilation/perfusion mismatch, airway collapse/obstruction and aspiration.  Stimulate patient to increase arousal and respiratory effort.  Encourage pulmonary hygiene, such as cough-enhancement and airway-clearance techniques that may include use of incentive spirometry, deep breathing and cough.  Provide oxygen therapy judiciously, if hypoxemia present.  Recent Flowsheet Documentation  Taken 12/25/2023 0859 by Miya Spence RN  Head of Bed (HOB) Positioning: HOB elevated     Problem: Sensorimotor Impairment (Anesthesia/Analgesia, Neuraxial)  Goal: Baseline Motor Function  Outcome: Met  Intervention: Optimize Sensorimotor Function  Description: Protect skin and areas of decreased sensation from moisture, heat, pressure, friction or shearing forces.  Position body with joints in neutral alignment; facilitate frequent position changes.  Monitor vital signs, oxygenation and dermatomes for level of anesthesia.  Monitor motor strength and ability to safely ambulate.  Implement environmental safety measures to decrease fall risk (e.g., declutter, lighting, assistive devices); reassess risk frequently.  Recent Flowsheet Documentation  Taken 12/25/2023 1114 by Miya Spence RN  Safety Promotion/Fall Prevention: safety round/check completed  Taken 12/25/2023 1030 by Miya Spence RN  Safety Promotion/Fall Prevention: safety round/check completed  Taken 12/25/2023 0950 by Miya Spence RN  Safety Promotion/Fall Prevention: safety round/check completed  Taken 12/25/2023 0859 by  Miya Spence RN  Safety Promotion/Fall Prevention: safety round/check completed     Problem: Urinary Retention (Anesthesia/Analgesia, Neuraxial)  Goal: Effective Urinary Elimination  Outcome: Met     Problem:  Fall Injury Risk  Goal: Absence of Fall, Infant Drop and Related Injury  Outcome: Met  Intervention: Identify and Manage Contributors  Description: Develop a fall prevention plan with the patient and family.  Promote use of personal vision and auditory aids.  Assess infant location, status and maternal assistance level required for safe and effective self and infant care; provide support for toileting, mobility and placement of infant in crib, as needed.  Define behavior and activity limits to patient and family to decrease fall or drop risk.  If fall occurs, assess the severity of injury; implement fall injury protocol. Determine the cause and revise fall injury prevention plan.  If fall occurs during pregnancy, assess fetal heart rate and movement, presence of uterine contractions or vaginal bleeding; verify membrane status.  Regularly review medication and contribution to fall risk; consider polypharmacy and high-risk medications that may include neuraxial anesthesia, sedation and narcotic analgesia given within the last 24 hours.  Balance adequate pain management with potential for oversedation.  Recent Flowsheet Documentation  Taken 2023 0859 by Miya Spence RN  Medication Review/Management: medications reviewed  Intervention: Promote Injury-Free Environment  Description: Provide a safe, barrier-free environment that encourages independent activity.  Keep care area uncluttered and well-lighted.  Determine the need for increased observation or monitoring.  Avoid use of devices that minimize mobility, such as restraints or indwelling urinary catheter.  Recent Flowsheet Documentation  Taken 2023 1114 by Miya Spence RN  Safety Promotion/Fall Prevention: safety round/check  completed  Taken 12/25/2023 1030 by Miya Spence RN  Safety Promotion/Fall Prevention: safety round/check completed  Taken 12/25/2023 0950 by Miya Spence RN  Safety Promotion/Fall Prevention: safety round/check completed  Taken 12/25/2023 0859 by Miya Spence RN  Safety Promotion/Fall Prevention: safety round/check completed     Problem: Skin Injury Risk Increased  Goal: Skin Health and Integrity  Outcome: Met  Intervention: Optimize Skin Protection  Description: Perform a full pressure injury risk assessment, as indicated by screening, upon admission to care unit.  Reassess skin (injury risk, full inspection) frequently (e.g., scheduled interval, with change in condition) to provide optimal early detection and prevention.  Maintain adequate tissue perfusion (e.g., encourage fluid balance; avoid crossing legs, constrictive clothing or devices) to promote tissue oxygenation.  Maintain head of bed at lowest degree of elevation tolerated, considering medical condition and other restrictions.  Avoid positioning onto an area that remains reddened.  Minimize incontinence and moisture (e.g., toileting schedule; moisture-wicking pad, diaper or incontinence collection device; skin moisture barrier).  Cleanse skin promptly and gently when soiled utilizing a pH-balanced cleanser.  Relieve and redistribute pressure (e.g., scheduled position changes, weight shifts, use of support surface, medical device repositioning, protective dressing application, use of positioning device, microclimate control, use of pressure-injury-monitor  Encourage increased activity, such as sitting in a chair at the bedside or early mobilization, when able to tolerate.  Recent Flowsheet Documentation  Taken 12/25/2023 0859 by Miya Spence RN  Head of Bed (HOB) Positioning: HOB elevated   Goal Outcome Evaluation:  Plan of Care Reviewed With: patient        Progress: improving  Outcome Evaluation: VSS, assessment WDL, up ad kayleen voiding  without difficulty, incision DELL WDL per provider, pain control with PO meds. Bottle feeding infant, infant to stay as nursery infant. Patient to board. DC today

## 2023-12-25 NOTE — PLAN OF CARE
Goal Outcome Evaluation:         Progressing well, voids without diff, passing flatus, minimal bleeding. Pain controlled with meds.  Wants to go home today

## 2023-12-25 NOTE — DISCHARGE SUMMARY
Psychiatric   Obstetrics and Gynecology    Section Discharge Summary    Date of Admission: 2023  Date of Discharge:        Patient: Chiquita Galvez      MR#:[id number]    Surgeon/OB: Heather Eckert MD    Discharge Diagnosis:    section at 39w0d, uncomplicated recovery    Maternal care due to low transverse uterine scar from previous  delivery    History of postpartum hemorrhage, currently pregnant    Iron deficiency anemia during pregnancy    LGSIL on Pap smear of cervix    Current pregnancy with history of pre-term labor in third trimester    39 weeks gestation of pregnancy    History of prior pregnancy with IUGR     Nausea and vomiting during pregnancy    H/O syphilis    GBS bacteriuria     delivery delivered    H/O  section complicating pregnancy        Procedures:  , Low Transverse     2023    7:57 AM      Anesthesia:  Spinal     Hospital Course  Patient is a 28 y.o. female  at 39w0d status post  section with Mirena IUD insertion with uneventful postoperative recovery.  Patient was advanced to regular diet on postoperative day#1.  On discharge, ambulating, tolerating a regular diet without any difficulties and her incision is dry, clean and intact.     Infant:   female  fetus 3420 g (7 lb 8.6 oz)  with Apgar scores of 8 , 9  at five minutes.    Condition on Discharge:  Stable    Vital Signs  Temp:  [97.6 °F (36.4 °C)-98.3 °F (36.8 °C)] 98.3 °F (36.8 °C)  Heart Rate:  [] 76  Resp:  [16-18] 16  BP: ()/(59-71) 105/70    Results from last 7 days   Lab Units 23  0611 23  0601   WBC 10*3/mm3 12.53* 9.41   HEMOGLOBIN g/dL 10.1* 11.1*   HEMATOCRIT % 30.6* 35.1   PLATELETS 10*3/mm3 231 246             Discharge Disposition  Home or Self Care    Discharge Medications     Your medication list        START taking these medications        Instructions Last Dose Given Next Dose Due   ibuprofen 600 MG  tablet  Commonly known as: ADVIL,MOTRIN      Take 1 tablet by mouth Every 6 (Six) Hours.       oxyCODONE 5 MG immediate release tablet  Commonly known as: ROXICODONE      Take 1 tablet by mouth Every 6 (Six) Hours As Needed for Moderate Pain for up to 5 days.       sennosides-docusate 8.6-50 MG per tablet  Commonly known as: PERICOLACE      Take 1 tablet by mouth 2 (Two) Times a Day.              CHANGE how you take these medications        Instructions Last Dose Given Next Dose Due   acetaminophen 500 MG tablet  Commonly known as: TYLENOL  What changed: Another medication with the same name was added. Make sure you understand how and when to take each.      Take 1 tablet by mouth Every 6 (Six) Hours As Needed for Mild Pain.       acetaminophen 325 MG tablet  Commonly known as: TYLENOL  What changed: You were already taking a medication with the same name, and this prescription was added. Make sure you understand how and when to take each.      Take 2 tablets by mouth Every 6 (Six) Hours.              CONTINUE taking these medications        Instructions Last Dose Given Next Dose Due   Abrysvo 120 MCG/0.5ML reconstituted solution injection  Generic drug: RSV Pre-Fusion F A&B Vac Rcmb      Inject  into the appropriate muscle as directed by prescriber.       Prenatal Vitamin 27-0.8 MG tablet      Take 1 tablet by mouth Daily.                 Where to Get Your Medications        These medications were sent to [place] [id number]      Hours: Monday to Friday 7 AM to 6 PM, Saturday & Sunday 8 AM to 4:30 PM (Closed 12 PM to 12:30 PM) Phone: [phone]   acetaminophen 325 MG tablet  ibuprofen 600 MG tablet  oxyCODONE 5 MG immediate release tablet  sennosides-docusate 8.6-50 MG per tablet           Discharge Diet: Regular    Follow-up Appointments  No future appointments.  Additional Instructions for the Follow-ups that You Need to Schedule       Call MD for problems  / concerns.   As directed      Call for any problems with  1.  Heavy vaginal bleeding (greater than 2 pads an hours for 2 hours)  2.  Fever above 101.3  3.  Incisional redness  4.  Signs of Preeclampsia:  headache, visual changes or elevated blood pressure    Order Comments: Call for any problems with 1.  Heavy vaginal bleeding (greater than 2 pads an hours for 2 hours) 2.  Fever above 101.3 3.  Incisional redness 4.  Signs of Preeclampsia:  headache, visual changes or elevated blood pressure                 Prenatal labs/vax:   Immunization History   Administered Date(s) Administered    ABRYSVO (RSV, 60+ or pregnant women 32-36 wks) 11/22/2023    Fluzone (or Fluarix & Flulaval for VFC) >6mos 11/11/2021, 11/22/2023    Tdap 02/12/2020, 02/17/2022, 10/12/2023       External Prenatal Results       Pregnancy Outside Results - Transcribed From Office Records - See Scanned Records For Details       Test Value Date Time    ABO  B  12/23/23 0601    Rh  Positive  12/23/23 0601    Antibody Screen  Negative  12/23/23 0601       Negative  06/08/23 1137    Varicella IgG  1,585 index 06/08/23 1137    Rubella  7.31 index 06/08/23 1137    Hgb  10.1 g/dL 12/24/23 0611       11.1 g/dL 12/23/23 0601       11.5 g/dL 11/22/23 1242       7.9 g/dL 09/27/23 1150       8.6 g/dL 09/05/23 1212       10.6 g/dL 06/08/23 1137    Hct  30.6 % 12/24/23 0611       35.1 % 12/23/23 0601       35.9 % 11/22/23 1242       26.3 % 09/27/23 1150       27.8 % 09/05/23 1212       34.1 % 06/08/23 1137    Glucose Fasting GTT       Glucose Tolerance Test 1 hour       Glucose Tolerance Test 3 hour  110 mg/dL 02/20/20 1041    Gonorrhea (discrete)  Negative  06/08/23 1720    Chlamydia (discrete)  Negative  06/08/23 1720    RPR  Reactive  11/22/23 1242       Reactive  06/08/23 1137    VDRL       Syphilis Antibody  Reactive  11/22/23 1242       Reactive  06/08/23 1137    HBsAg  Negative  06/08/23 1137    Herpes Simplex Virus PCR       Herpes Simplex VIrus Culture        HIV  Non Reactive  11/22/23 1242       Non Reactive  06/08/23 1137    Hep C RNA Quant PCR       Hep C Antibody  Non Reactive  06/08/23 1137    AFP  67.3 ng/mL 08/03/23 1413    Group B Strep  Negative  03/11/20 1634    GBS Susceptibility to Clindamycin       GBS Susceptibility to Erythromycin       Fetal Fibronectin       Genetic Testing, Maternal Blood                 Drug Screening       Test Value Date Time    Urine Drug Screen       Amphetamine Screen  Negative ng/mL 06/08/23 1736    Barbiturate Screen  Negative  12/23/23 1224       Negative  12/23/23 0705       Negative ng/mL 06/08/23 1736    Benzodiazepine Screen  Negative  12/23/23 1224       Negative  12/23/23 0705       Negative ng/mL 06/08/23 1736    Methadone Screen  Negative  12/23/23 1224       Negative  12/23/23 0705       Negative ng/mL 06/08/23 1736    Phencyclidine Screen  Negative ng/mL 06/08/23 1736    Opiates Screen  Positive  12/23/23 1224       Negative  12/23/23 0705    THC Screen  Negative  12/23/23 1224       Negative  12/23/23 0705    Cocaine Screen       Propoxyphene Screen  Negative ng/mL 06/08/23 1736    Buprenorphine Screen       Methamphetamine Screen       Oxycodone Screen  Negative  12/23/23 1224       Negative  12/23/23 0705    Tricyclic Antidepressants Screen                 Legend    ^: Historical                              Heather Laine Eckert MD  12/25/2023  10:30 EST

## 2023-12-27 ENCOUNTER — PATIENT OUTREACH (OUTPATIENT)
Dept: LABOR AND DELIVERY | Facility: HOSPITAL | Age: 28
End: 2023-12-27
Payer: COMMERCIAL

## 2023-12-27 NOTE — OUTREACH NOTE
[facility]  Unable to Reach       Questions/Answers      Flowsheet Row Responses   Pending Outreach Postpartum Check-in   Call Attempt First   Outcome Left message   Next Call Attempt Date 01/02/24          Unable to visit inpatient due to holiday so calling for check in this week. Will attempt to call or meet at appt next week.     Sofy Lomeli RN  Maternity Nurse Navigator    12/27/2023, 10:10 EST

## 2023-12-28 LAB
FENTANYL UR CFM-MCNC: 141 NG/MG CREAT
FENTANYL UR QL CFM: NORMAL
NORFENTANYL UR CFM-MCNC: 1032 NG/MG CREAT

## 2023-12-29 LAB
AMPHETAMINES UR QL: NEGATIVE
LABORATORY COMMENT REPORT: NORMAL

## 2023-12-30 LAB — BZE UR QL: NEGATIVE

## 2024-01-02 NOTE — PROGRESS NOTES
"Continued Stay Note  [facility]     Patient Name: Chiquita Galvez  MRN: [id number]  Today's Date: 1/2/2024    Admit Date: 12/23/2023    Plan: Infant may discharge to mother when medically ready, unless CPS states otherwise. CSW will follow cord tox. MARI Truong   Discharge Plan       Row Name 01/02/24 1341       Plan    Plan Comments Mother: Chiquita Galvez, MRN [id number]; Infant: Macyiralexandria \"Chester\" Lenny, MRN [id number]. CSW has reviewed infant's cord toxicology results and infant's cord was negative for substances. CSW sent a copy of infant's cord toxicology results to CPS worker, Lalita Aponte. MARI Minor.                   Discharge Codes    No documentation.                 Expected Discharge Date and Time       Expected Discharge Date Expected Discharge Time    Dec 25, 2023               [facility]    "

## 2024-01-03 ENCOUNTER — PATIENT OUTREACH (OUTPATIENT)
Dept: LABOR AND DELIVERY | Facility: HOSPITAL | Age: 29
End: 2024-01-03
Payer: COMMERCIAL

## 2024-01-03 LAB — OPIATES UR QL: NEGATIVE

## 2024-01-03 NOTE — OUTREACH NOTE
[facility]  Postpartum Check-In    Questions/Answers      Flowsheet Row Responses   Visit Setting Telephone   Best Method for Contacting Cell   OB Discharge Note Reviewed  Reviewed   OB Discharge Medications Reviewed  Reviewed    discharged home with mother? Yes   Current Pain Levels 0-10 8   At Rest Pain Levels 0-10 8   Pain level with activity 0-10 8   Acceptable Pain Level 0-10 5   Pain Location Abdomen   Pain Description Incisional, Sore   How do you treat your pain? Medications   Verbalized Emotional State Acceptance   Family/Support Network Spouse   Level of Involvement in Care Attentive, Interactive, Supportive   Do you feel comfortable in your relationship with your baby? Yes   Have members of your household adjusted to your baby? Yes   Is the baby's father supportive and/or involved with the baby? Yes   How does your partner feel about the baby? Involved, Happy   Do you feel safe at home, school and work? Yes   Are you in a relationship with someone who threatens you or hurts you? No   Do you have the resources to keep yourself and your baby healthy and safe? Yes   Lochia (per patient report) Brown-seamus Red   Amount Spotting   Number of pads per day 5   Lochia Odor None   Is patient breastfeeding? No   How is breast suppression going? feeling fine   Postpartum Depression Screening Education Education Provided   Doctor Appointments: Education Provided   Family Planning Education Education Provided   Postpartum Care Education Education Provided   S & S to report Education Provided   Followup Appointments Made Other   Well Child Visit Appointments Made Yes   Other missed appt, will call to get her new appt   Well Child Checkup Provider Name [facility]   Well Child Check Up Date: 24   Umbilical Cord No reported signs or symptoms   Was the baby circumcised? No   Feeding Readiness Cues: Crying, Eager   Infant Feeding Method Formula   Is a lactation referral indicated? No   Formula Type --  " [similac advanced]   Formula PO (mL) 3-4 oz   Formula/Expressed Milk frequency of feedings: 3.5-4 hours   Number of wet diapers x 24 hours 8   Last BM x 24 hours 2   What safe sleep surface is available? Bassinet   Are there stuffed animals, toys, pillows, quilts, blankets, wedges, positioners, bumpers or other loose bedding in the infant's sleeping environment? No   Where does the baby usually sleep? Bassinet   Does the baby ever share a sleep surface with a sibling, adult or pet? No   Does the baby ever share a sleep surface in a bed, couch, recliner or other? No   What position do you place your baby to sleep for naps? Back   What position do you place your baby to sleep at night Back   Are you and/or other caregivers smoking inside or outside the baby's home? No   Is the infant dressed appropiately for the temperature of the home? Yes   Do you use a clean, dry pacifier that is not attached to a string or stuffed animal? Yes            Review of Systems    Most Recent Flom  Depression Scale Score (EPDS)    Performed by a clinician: 7 (1/3/2024 12:05 PM)    Pt reports that recovery is going slow, she is still having 8/10 pain and sleeping on the couch. She has been taking tylenol with minimal relief. She states that she missed her appt yesterday due to the pain. Infant is formula feeding and doing well, she has her first peds app[facility] at [facility] tomorrow. Pt reports good support at home but she is still struggling with the pain and feeling like she is \"useless\". She declines resources or referral for therapy. Call the office for an appt with Dr Eckert tomorrow but then pt not answering when I called her back to tell her this. I will call her in the morning and can assist with transportation if needed. I will also plan to call her next week for an additional follow up.     Sofy MELISSA - CHARLY  Maternity Nurse Navigator    1/3/2024, 12:06 EST    "

## 2024-01-04 ENCOUNTER — POSTPARTUM VISIT (OUTPATIENT)
Dept: OBSTETRICS AND GYNECOLOGY | Age: 29
End: 2024-01-04
Payer: COMMERCIAL

## 2024-01-04 VITALS
HEIGHT: 63 IN | SYSTOLIC BLOOD PRESSURE: 112 MMHG | BODY MASS INDEX: 23.67 KG/M2 | WEIGHT: 133.6 LBS | DIASTOLIC BLOOD PRESSURE: 66 MMHG

## 2024-01-04 PROBLEM — Z86.19 H/O SYPHILIS: Status: RESOLVED | Noted: 2023-07-06 | Resolved: 2024-01-04

## 2024-01-04 PROBLEM — O09.213 CURRENT PREGNANCY WITH HISTORY OF PRE-TERM LABOR IN THIRD TRIMESTER: Status: RESOLVED | Noted: 2020-03-18 | Resolved: 2024-01-04

## 2024-01-04 PROBLEM — O34.211 MATERNAL CARE DUE TO LOW TRANSVERSE UTERINE SCAR FROM PREVIOUS CESAREAN DELIVERY: Status: RESOLVED | Noted: 2020-02-12 | Resolved: 2024-01-04

## 2024-01-04 PROBLEM — O21.9 NAUSEA AND VOMITING DURING PREGNANCY: Status: RESOLVED | Noted: 2023-06-08 | Resolved: 2024-01-04

## 2024-01-04 PROBLEM — R82.71 GBS BACTERIURIA: Status: RESOLVED | Noted: 2023-10-29 | Resolved: 2024-01-04

## 2024-01-04 PROBLEM — O34.219 H/O CESAREAN SECTION COMPLICATING PREGNANCY: Status: RESOLVED | Noted: 2023-12-23 | Resolved: 2024-01-04

## 2024-01-04 PROBLEM — Z87.59 HISTORY OF PRIOR PREGNANCY WITH IUGR NEWBORN: Status: RESOLVED | Noted: 2023-06-08 | Resolved: 2024-01-04

## 2024-01-04 PROBLEM — Z3A.39 39 WEEKS GESTATION OF PREGNANCY: Status: RESOLVED | Noted: 2021-11-11 | Resolved: 2024-01-04

## 2024-01-04 PROBLEM — O09.299 HISTORY OF POSTPARTUM HEMORRHAGE, CURRENTLY PREGNANT: Status: RESOLVED | Noted: 2020-02-21 | Resolved: 2024-01-04

## 2024-01-04 PROBLEM — R87.612 LGSIL ON PAP SMEAR OF CERVIX: Status: RESOLVED | Noted: 2020-03-04 | Resolved: 2024-01-04

## 2024-01-04 RX ORDER — CYCLOBENZAPRINE HCL 10 MG
10 TABLET ORAL EVERY 8 HOURS PRN
Qty: 30 TABLET | Refills: 0 | Status: SHIPPED | OUTPATIENT
Start: 2024-01-04 | End: 2025-01-03

## 2024-01-04 RX ORDER — DOCUSATE SODIUM 100 MG/1
100 CAPSULE, LIQUID FILLED ORAL 2 TIMES DAILY PRN
Qty: 60 CAPSULE | Refills: 1 | Status: SHIPPED | OUTPATIENT
Start: 2024-01-04

## 2024-01-04 NOTE — PROGRESS NOTES
"[facility]   Obstetrics and Gynecology   Postpartum Visit    2024    Patient: Chiquita Galvez          MR#:[id number]    History of Present Illness    Chief Complaint   Patient presents with    Postpartum Care     PP incision check, C/S and Mirena insertion 2023 female \"Leila", Bottle feeding       28 y.o. female  status post RCS with Mirena IUD insertion 23 presents for postpartum visit.  Patient reports significant pain since surgery.  She is now taking ibuprofen only but feels like it is insufficient.  Pain worsens with activity, feels like pulling on sutures.  Tolerating regular diet but appetite minimal.  Drinking Ensure.  Having BM but pain with defecation.  Very constipated.    Mood stable.  Formula-feeding without issue.  Bleeding stable.  No intercourse since delivery.    Contraception: Mirena IUD inserted at CS  Vaccines: RI, VI, s/p tdap/flu/RSV  Pap: NIL 23  ________________________________________  Patient Active Problem List   Diagnosis    Iron deficiency anemia during pregnancy    Iron adverse reaction     delivery delivered       Past Medical History:   Diagnosis Date    History of anemia     pt states been recieving Fe infusions with current pregnancy    History of transfusion     multple blood transfusions during PREVIOUS pregnancy due to severe anemia. Iron infusions prior to delivery and preventative blood transfusion after deliveries with all pregnancies.    History of urinary tract infection     Migraine     Syphilis     [facility]       Past Surgical History:   Procedure Laterality Date     SECTION  2017     SECTION N/A 3/18/2020    Procedure:  SECTION REPEAT;  Surgeon: Bisi Bermudez MD;  Location: [facility] DELIVERY;  Service: Obstetrics/Gynecology;  Laterality: N/A;     SECTION Bilateral 3/28/2022    Procedure:  SECTION PRIMARY;  Surgeon: " "Brooks Irwin MD;  Location: [facility] LABOR DELIVERY;  Service: Obstetrics/Gynecology;  Laterality: Bilateral;     SECTION N/A 2023    Procedure:  SECTION REPEAT with Mirena IUD insertion;  Surgeon: Heather Eckert MD;  Location: [facility] LABOR DELIVERY;  Service: Obstetrics/Gynecology;  Laterality: N/A;    DILATATION AND CURETTAGE      Miscarriage       Social History     Tobacco Use   Smoking Status Never    Passive exposure: Never   Smokeless Tobacco Never       has a current medication list which includes the following prescription(s): ibuprofen, cyclobenzaprine, docusate sodium, and prenatal vitamin.  ________________________________________         Review of Systems   All other systems reviewed and are negative.      Objective     /66   Ht 160 cm (63\")   Wt 60.6 kg (133 lb 9.6 oz)   Breastfeeding No   BMI 23.67 kg/m²    BP Readings from Last 3 Encounters:   24 112/66   23 105/70   23 102/62      Wt Readings from Last 3 Encounters:   24 60.6 kg (133 lb 9.6 oz)   23 69.7 kg (153 lb 9.6 oz)   23 70.7 kg (155 lb 12.8 oz)      BMI: Estimated body mass index is 23.67 kg/m² as calculated from the following:    Height as of this encounter: 160 cm (63\").    Weight as of this encounter: 60.6 kg (133 lb 9.6 oz).    EXAM     General:     Patient appears well in NAD  Respiratory: Normal effort, no distress  Breast:  declined  Abdomen: Soft, nontender, nondistended, no acute findings, incision c/d/I, no signs of infection  Pelvic:  deferred  Ext:  No cyanosis or edema    Assessment:    Diagnoses and all orders for this visit:    1. Postpartum follow-up (Primary)    2.  delivery delivered    Other orders  -     docusate sodium (Colace) 100 MG capsule; Take 1 capsule by mouth 2 (Two) Times a Day As Needed for Constipation.  Dispense: 60 capsule; Refill: 1  -     cyclobenzaprine (FLEXERIL) 10 MG tablet; Take 1 tablet by mouth Every 8 (Eight) Hours " As Needed for Muscle Spasms.  Dispense: 30 tablet; Refill: 0    -Doing okay postpartum but struggling with pain.  Discussed continuing ibuprofen and Tylenol.  Rx Flexeril provided.  Discussed that we ideally avoid narcotics, especially this far out from surgery.  Exam appropriate.  - Mirena IUD in place, will evaluate strings at 6-week postpartum visit  - Follow-up in 2 weeks to evaluate symptoms.  Discussed that it is okay to delay until 6 weeks postpartum if she is doing great.    Plan:  Return in about 2 weeks (around 1/18/2024) for postpartum visit.      Heather Eckert MD  1/4/2024 16:42 EST

## 2024-01-11 ENCOUNTER — PATIENT OUTREACH (OUTPATIENT)
Dept: LABOR AND DELIVERY | Facility: HOSPITAL | Age: 29
End: 2024-01-11
Payer: COMMERCIAL

## 2024-01-11 NOTE — OUTREACH NOTE
[facility]  Postpartum Check-In    Questions/Answers      Flowsheet Row Responses   Current Pain Levels 0-10 4   At Rest Pain Levels 0-10 4   Pain level with activity 0-10 6   Acceptable Pain Level 0-10 4   Pain Location Abdomen   Pain Description Incisional, Sore, Intermittent   How do you treat your pain? Medications          Called patient again this week just to check on pain and mood. She was seen by the OB last week and given flexeril. She did not feel that the flexeril helped but the pain is gradually improving. She is using ibuprofen still as needed. She reports that her mood is also improved; as the pain decreases she has been able to do more around the house. Reviewed plans for her f/u appt. She reports that everything is good with the infant; she was seen at the peds office last week with no reported concerns. Will send to RN call center.     Sofy Lomeli RN  Maternity Nurse Navigator    1/11/2024, 12:11 EST

## 2024-01-18 ENCOUNTER — PATIENT OUTREACH (OUTPATIENT)
Dept: CALL CENTER | Facility: HOSPITAL | Age: 29
End: 2024-01-18
Payer: COMMERCIAL

## 2024-01-18 NOTE — OUTREACH NOTE
Motherhood Connection Survey      Flowsheet Row Responses   [facility]   [facility] successful? Yes   Call start time 09   Call end time 903   Baby sex Girl   Baby sex Girl   Delivery type    Emotional state Acceptance   Family support Yes   Do you have all necessary resources to care for you and your baby?  Yes   Have members of your household adjusted to your baby? Yes   Did you have any problems with pre-eclampsia during this pregnancy? No   Did you have blood glucose issues during this pregnancy No   Lochia amount Light   Did you have an episiotomy/tear/abdominal incision? Yes   Additional comments Healing well   Feeding Method Bottle   Frequency 3-4 hours   Amount 4 ounces   Signs baby is ready to eat Rooting, Finger sucking, Crying   Number of wet diapers x 24 hours 6-8   Last BM x 24 hours 2   Umbilical Cord No reported signs or symptoms   Where does the baby usually sleep? Bassinet   Are there stuffed animals, toys, pillows, quilts, blankets, wedges, positioners, bumpers or other loose bedding in the infant's sleeping environment? No   Does the baby ever share a sleep surface in a bed, couch, recliner or other? No   What position do you lay your baby down to sleep? Back   Mom appointment comments: at 6 weeks   Baby appointment comments: went recently   Call completed? Yes              Paula MELISSA - Registered Nurse

## 2024-09-19 ENCOUNTER — TELEPHONE (OUTPATIENT)
Dept: OBSTETRICS AND GYNECOLOGY | Age: 29
End: 2024-09-19

## 2024-09-19 ENCOUNTER — OFFICE VISIT (OUTPATIENT)
Dept: OBSTETRICS AND GYNECOLOGY | Age: 29
End: 2024-09-19
Payer: COMMERCIAL

## 2024-09-19 VITALS
BODY MASS INDEX: 20.73 KG/M2 | DIASTOLIC BLOOD PRESSURE: 58 MMHG | HEIGHT: 63 IN | SYSTOLIC BLOOD PRESSURE: 106 MMHG | WEIGHT: 117 LBS

## 2024-09-19 DIAGNOSIS — Z97.5 IUD (INTRAUTERINE DEVICE) IN PLACE: Primary | ICD-10-CM

## 2024-09-19 DIAGNOSIS — N92.1 BREAKTHROUGH BLEEDING ASSOCIATED WITH INTRAUTERINE DEVICE (IUD): ICD-10-CM

## 2024-09-19 DIAGNOSIS — Z97.5 BREAKTHROUGH BLEEDING ASSOCIATED WITH INTRAUTERINE DEVICE (IUD): ICD-10-CM

## 2024-09-19 RX ORDER — BUPRENORPHINE HYDROCHLORIDE AND NALOXONE HYDROCHLORIDE DIHYDRATE 8; 2 MG/1; MG/1
TABLET SUBLINGUAL
COMMUNITY
Start: 2024-09-10

## 2024-09-19 NOTE — TELEPHONE ENCOUNTER
Caller: Chiquita Galvez    Relationship to patient: Self    Best call back number: [phone] (home)       Patient is needing: PT IS SCHEDULED 10/03 WITH DR HSU FOR IUD REMOVAL. PT STATES THAT SHE HAS BEEN HAVING AUB FOR TWO MONTHS. SHE STATES THE BLEEDING IS LIKE A PERIOD. SHE IS ALSO HAVING SHARP STABBING PAIN IN THE LEFT SIDE. SHE WANTED TO SEE ABOUT GETTING IN SOONER DUE TO THE ISSUES.

## (undated) DEVICE — SUT MNCRYL PLS ANTIB UD 4/0 PS2 18IN

## (undated) DEVICE — ANTIBACTERIAL UNDYED BRAIDED (POLYGLACTIN 910), SYNTHETIC ABSORBABLE SUTURE: Brand: COATED VICRYL

## (undated) DEVICE — SUT MNCRYL 3/0 KS 27IN UD MCP523H

## (undated) DEVICE — SOL IRR H2O BTL 1000ML STRL

## (undated) DEVICE — GLV SURG BIOGEL LTX PF 6 1/2

## (undated) DEVICE — KENDALL SCD EXPRESS SLEEVES, KNEE LENGTH, MEDIUM: Brand: KENDALL SCD

## (undated) DEVICE — SUT VIC PLS 0 CTX 36IN UD VCP978H

## (undated) DEVICE — 3M(TM) TEGADERM(TM) TRANSPARENT FILM DRESSING FRAME STYLE 1627: Brand: 3M™ TEGADERM™

## (undated) DEVICE — SUT MNCRYL 0 CT1 36IN UD MCP946H

## (undated) DEVICE — GLV SURG TRIUMPH CLASSIC PF LTX 6 STRL

## (undated) DEVICE — ADHS SKIN DERMABOND TOPICAL HI VISC

## (undated) DEVICE — SUT MNCRYL 0/0 CTX 36IN Y398H

## (undated) DEVICE — SUT MONOCRYL PLS 3/0 CT1 UD/MF 90CM MCP944H

## (undated) DEVICE — CUFF SCD HEMOFORCE SEQ CALF STD MD

## (undated) DEVICE — KT ART BLD GAS QUICK DRAW

## (undated) DEVICE — SOL IRR NACL 0.9PCT BT 1000ML

## (undated) DEVICE — SLV SCD CALF HEMOFORCE DVT THERP REPROC MD